# Patient Record
Sex: FEMALE | Race: WHITE | NOT HISPANIC OR LATINO | Employment: OTHER | URBAN - METROPOLITAN AREA
[De-identification: names, ages, dates, MRNs, and addresses within clinical notes are randomized per-mention and may not be internally consistent; named-entity substitution may affect disease eponyms.]

---

## 2019-10-25 ENCOUNTER — HOSPITAL ENCOUNTER (OUTPATIENT)
Facility: HOSPITAL | Age: 84
Discharge: HOME HEALTH | End: 2019-10-28
Attending: EMERGENCY MEDICINE | Admitting: EMERGENCY MEDICINE
Payer: MEDICARE

## 2019-10-25 DIAGNOSIS — R07.9 CHEST PAIN: ICD-10-CM

## 2019-10-25 DIAGNOSIS — E86.0 DEHYDRATION: ICD-10-CM

## 2019-10-25 DIAGNOSIS — I50.9 CHF (CONGESTIVE HEART FAILURE): Primary | ICD-10-CM

## 2019-10-25 PROBLEM — R00.1 BRADYCARDIA: Status: ACTIVE | Noted: 2019-10-25

## 2019-10-25 LAB
ALBUMIN SERPL BCP-MCNC: 3.6 G/DL (ref 3.5–5.2)
ALP SERPL-CCNC: 101 U/L (ref 55–135)
ALT SERPL W/O P-5'-P-CCNC: 18 U/L (ref 10–44)
ANION GAP SERPL CALC-SCNC: 11 MMOL/L (ref 8–16)
AST SERPL-CCNC: 24 U/L (ref 10–40)
BASOPHILS # BLD AUTO: 0.04 K/UL (ref 0–0.2)
BASOPHILS NFR BLD: 0.5 % (ref 0–1.9)
BILIRUB SERPL-MCNC: 0.6 MG/DL (ref 0.1–1)
BILIRUB UR QL STRIP: NEGATIVE
BNP SERPL-MCNC: 286 PG/ML (ref 0–99)
BUN SERPL-MCNC: 19 MG/DL (ref 8–23)
CALCIUM SERPL-MCNC: 9.8 MG/DL (ref 8.7–10.5)
CHLORIDE SERPL-SCNC: 106 MMOL/L (ref 95–110)
CK SERPL-CCNC: 48 U/L (ref 20–180)
CLARITY UR: CLEAR
CO2 SERPL-SCNC: 24 MMOL/L (ref 23–29)
COLOR UR: YELLOW
CREAT SERPL-MCNC: 1.9 MG/DL (ref 0.5–1.4)
DIFFERENTIAL METHOD: ABNORMAL
EOSINOPHIL # BLD AUTO: 0.1 K/UL (ref 0–0.5)
EOSINOPHIL NFR BLD: 1.7 % (ref 0–8)
ERYTHROCYTE [DISTWIDTH] IN BLOOD BY AUTOMATED COUNT: 12.9 % (ref 11.5–14.5)
EST. GFR  (AFRICAN AMERICAN): 27 ML/MIN/1.73 M^2
EST. GFR  (NON AFRICAN AMERICAN): 23 ML/MIN/1.73 M^2
GLUCOSE SERPL-MCNC: 111 MG/DL (ref 70–110)
GLUCOSE UR QL STRIP: NEGATIVE
HCT VFR BLD AUTO: 39.7 % (ref 37–48.5)
HGB BLD-MCNC: 13 G/DL (ref 12–16)
HGB UR QL STRIP: NEGATIVE
IMM GRANULOCYTES # BLD AUTO: 0.03 K/UL (ref 0–0.04)
IMM GRANULOCYTES NFR BLD AUTO: 0.4 % (ref 0–0.5)
KETONES UR QL STRIP: NEGATIVE
LEUKOCYTE ESTERASE UR QL STRIP: NEGATIVE
LYMPHOCYTES # BLD AUTO: 2.7 K/UL (ref 1–4.8)
LYMPHOCYTES NFR BLD: 33.8 % (ref 18–48)
MAGNESIUM SERPL-MCNC: 2 MG/DL (ref 1.6–2.6)
MCH RBC QN AUTO: 32.1 PG (ref 27–31)
MCHC RBC AUTO-ENTMCNC: 32.7 G/DL (ref 32–36)
MCV RBC AUTO: 98 FL (ref 82–98)
MONOCYTES # BLD AUTO: 0.9 K/UL (ref 0.3–1)
MONOCYTES NFR BLD: 11 % (ref 4–15)
NEUTROPHILS # BLD AUTO: 4.1 K/UL (ref 1.8–7.7)
NEUTROPHILS NFR BLD: 52.6 % (ref 38–73)
NITRITE UR QL STRIP: NEGATIVE
NRBC BLD-RTO: 0 /100 WBC
PH UR STRIP: 6 [PH] (ref 5–8)
PLATELET # BLD AUTO: 142 K/UL (ref 150–350)
PMV BLD AUTO: 11.6 FL (ref 9.2–12.9)
POTASSIUM SERPL-SCNC: 3.9 MMOL/L (ref 3.5–5.1)
PROT SERPL-MCNC: 6.7 G/DL (ref 6–8.4)
PROT UR QL STRIP: NEGATIVE
RBC # BLD AUTO: 4.05 M/UL (ref 4–5.4)
SODIUM SERPL-SCNC: 141 MMOL/L (ref 136–145)
SP GR UR STRIP: <=1.005 (ref 1–1.03)
TROPONIN I SERPL DL<=0.01 NG/ML-MCNC: 0.03 NG/ML (ref 0–0.03)
TSH SERPL DL<=0.005 MIU/L-ACNC: 0.87 UIU/ML (ref 0.4–4)
URN SPEC COLLECT METH UR: ABNORMAL
UROBILINOGEN UR STRIP-ACNC: NEGATIVE EU/DL
WBC # BLD AUTO: 7.85 K/UL (ref 3.9–12.7)

## 2019-10-25 PROCEDURE — 84484 ASSAY OF TROPONIN QUANT: CPT

## 2019-10-25 PROCEDURE — 83735 ASSAY OF MAGNESIUM: CPT

## 2019-10-25 PROCEDURE — G0378 HOSPITAL OBSERVATION PER HR: HCPCS

## 2019-10-25 PROCEDURE — 96361 HYDRATE IV INFUSION ADD-ON: CPT

## 2019-10-25 PROCEDURE — 82550 ASSAY OF CK (CPK): CPT

## 2019-10-25 PROCEDURE — 81003 URINALYSIS AUTO W/O SCOPE: CPT

## 2019-10-25 PROCEDURE — 36415 COLL VENOUS BLD VENIPUNCTURE: CPT

## 2019-10-25 PROCEDURE — 80053 COMPREHEN METABOLIC PANEL: CPT

## 2019-10-25 PROCEDURE — 25000003 PHARM REV CODE 250: Performed by: PHYSICIAN ASSISTANT

## 2019-10-25 PROCEDURE — 63600175 PHARM REV CODE 636 W HCPCS: Performed by: EMERGENCY MEDICINE

## 2019-10-25 PROCEDURE — 83880 ASSAY OF NATRIURETIC PEPTIDE: CPT

## 2019-10-25 PROCEDURE — 93010 ELECTROCARDIOGRAM REPORT: CPT | Mod: ,,, | Performed by: INTERNAL MEDICINE

## 2019-10-25 PROCEDURE — 93005 ELECTROCARDIOGRAM TRACING: CPT

## 2019-10-25 PROCEDURE — 84484 ASSAY OF TROPONIN QUANT: CPT | Mod: 91

## 2019-10-25 PROCEDURE — 93010 EKG 12-LEAD: ICD-10-PCS | Mod: ,,, | Performed by: INTERNAL MEDICINE

## 2019-10-25 PROCEDURE — 25000003 PHARM REV CODE 250: Performed by: NURSE PRACTITIONER

## 2019-10-25 PROCEDURE — 99285 EMERGENCY DEPT VISIT HI MDM: CPT | Mod: 25

## 2019-10-25 PROCEDURE — 85025 COMPLETE CBC W/AUTO DIFF WBC: CPT

## 2019-10-25 PROCEDURE — 84443 ASSAY THYROID STIM HORMONE: CPT

## 2019-10-25 RX ORDER — HYDROCODONE BITARTRATE AND ACETAMINOPHEN 5; 325 MG/1; MG/1
1 TABLET ORAL EVERY 6 HOURS PRN
Status: DISCONTINUED | OUTPATIENT
Start: 2019-10-25 | End: 2019-10-28 | Stop reason: HOSPADM

## 2019-10-25 RX ORDER — PANTOPRAZOLE SODIUM 40 MG/1
40 TABLET, DELAYED RELEASE ORAL DAILY
Status: DISCONTINUED | OUTPATIENT
Start: 2019-10-26 | End: 2019-10-28 | Stop reason: HOSPADM

## 2019-10-25 RX ORDER — HYDRALAZINE HYDROCHLORIDE 25 MG/1
25 TABLET, FILM COATED ORAL DAILY
Status: DISCONTINUED | OUTPATIENT
Start: 2019-10-26 | End: 2019-10-26

## 2019-10-25 RX ORDER — ASPIRIN 81 MG/1
81 TABLET ORAL DAILY
Status: DISCONTINUED | OUTPATIENT
Start: 2019-10-25 | End: 2019-10-28 | Stop reason: HOSPADM

## 2019-10-25 RX ORDER — HYDRALAZINE HYDROCHLORIDE 25 MG/1
25 TABLET, FILM COATED ORAL ONCE
Status: COMPLETED | OUTPATIENT
Start: 2019-10-25 | End: 2019-10-25

## 2019-10-25 RX ORDER — ACETAMINOPHEN 325 MG/1
650 TABLET ORAL EVERY 6 HOURS PRN
Status: DISCONTINUED | OUTPATIENT
Start: 2019-10-25 | End: 2019-10-28 | Stop reason: HOSPADM

## 2019-10-25 RX ORDER — AMLODIPINE AND VALSARTAN 5; 160 MG/1; MG/1
1 TABLET ORAL DAILY
Status: ON HOLD | COMMUNITY
End: 2019-10-28 | Stop reason: HOSPADM

## 2019-10-25 RX ORDER — BUSPIRONE HYDROCHLORIDE 15 MG/1
15 TABLET ORAL 3 TIMES DAILY
Status: ON HOLD | COMMUNITY
End: 2019-10-28 | Stop reason: HOSPADM

## 2019-10-25 RX ORDER — ATORVASTATIN CALCIUM 40 MG/1
40 TABLET, FILM COATED ORAL NIGHTLY
COMMUNITY
End: 2019-11-05 | Stop reason: SDUPTHER

## 2019-10-25 RX ORDER — LEVOTHYROXINE SODIUM 25 UG/1
25 TABLET ORAL
Status: DISCONTINUED | OUTPATIENT
Start: 2019-10-26 | End: 2019-10-28 | Stop reason: HOSPADM

## 2019-10-25 RX ORDER — OMEPRAZOLE 20 MG/1
20 TABLET, DELAYED RELEASE ORAL DAILY
Status: ON HOLD | COMMUNITY
End: 2019-10-28 | Stop reason: HOSPADM

## 2019-10-25 RX ORDER — MAG HYDROX/ALUMINUM HYD/SIMETH 200-200-20
30 SUSPENSION, ORAL (FINAL DOSE FORM) ORAL EVERY 6 HOURS PRN
Status: DISCONTINUED | OUTPATIENT
Start: 2019-10-25 | End: 2019-10-28 | Stop reason: HOSPADM

## 2019-10-25 RX ORDER — MAG HYDROX/ALUMINUM HYD/SIMETH 200-200-20
30 SUSPENSION, ORAL (FINAL DOSE FORM) ORAL
Status: DISCONTINUED | OUTPATIENT
Start: 2019-10-25 | End: 2019-10-25

## 2019-10-25 RX ORDER — MAG HYDROX/ALUMINUM HYD/SIMETH 200-200-20
30 SUSPENSION, ORAL (FINAL DOSE FORM) ORAL EVERY 6 HOURS PRN
Status: DISCONTINUED | OUTPATIENT
Start: 2019-10-25 | End: 2019-10-25

## 2019-10-25 RX ORDER — LEVOTHYROXINE SODIUM 25 UG/1
25 TABLET ORAL DAILY
COMMUNITY

## 2019-10-25 RX ORDER — HEPARIN SODIUM 5000 [USP'U]/ML
5000 INJECTION, SOLUTION INTRAVENOUS; SUBCUTANEOUS EVERY 8 HOURS
Status: DISCONTINUED | OUTPATIENT
Start: 2019-10-25 | End: 2019-10-28 | Stop reason: HOSPADM

## 2019-10-25 RX ORDER — ATORVASTATIN CALCIUM 40 MG/1
40 TABLET, FILM COATED ORAL NIGHTLY
Status: DISCONTINUED | OUTPATIENT
Start: 2019-10-25 | End: 2019-10-28 | Stop reason: HOSPADM

## 2019-10-25 RX ORDER — HYDRALAZINE HYDROCHLORIDE 25 MG/1
25 TABLET, FILM COATED ORAL DAILY
COMMUNITY
Start: 2019-08-18

## 2019-10-25 RX ORDER — HYDROCODONE BITARTRATE AND ACETAMINOPHEN 5; 325 MG/1; MG/1
0.5 TABLET ORAL
Status: ON HOLD | COMMUNITY
Start: 2019-06-03 | End: 2019-10-30 | Stop reason: HOSPADM

## 2019-10-25 RX ORDER — ONDANSETRON 8 MG/1
8 TABLET, ORALLY DISINTEGRATING ORAL EVERY 8 HOURS PRN
Status: DISCONTINUED | OUTPATIENT
Start: 2019-10-25 | End: 2019-10-28 | Stop reason: HOSPADM

## 2019-10-25 RX ORDER — LOSARTAN POTASSIUM 50 MG/1
50 TABLET ORAL DAILY
Status: DISCONTINUED | OUTPATIENT
Start: 2019-10-26 | End: 2019-10-26

## 2019-10-25 RX ORDER — AMLODIPINE BESYLATE 5 MG/1
5 TABLET ORAL DAILY
Status: DISCONTINUED | OUTPATIENT
Start: 2019-10-26 | End: 2019-10-28 | Stop reason: HOSPADM

## 2019-10-25 RX ORDER — FUROSEMIDE 20 MG/1
20 TABLET ORAL DAILY PRN
COMMUNITY
Start: 2019-08-18 | End: 2019-11-05 | Stop reason: SDUPTHER

## 2019-10-25 RX ORDER — TRAMADOL HYDROCHLORIDE 50 MG/1
50 TABLET ORAL EVERY 6 HOURS PRN
Status: DISCONTINUED | OUTPATIENT
Start: 2019-10-25 | End: 2019-10-28 | Stop reason: HOSPADM

## 2019-10-25 RX ORDER — METOPROLOL SUCCINATE 100 MG/1
100 TABLET, EXTENDED RELEASE ORAL DAILY
Status: ON HOLD | COMMUNITY
Start: 2019-08-18 | End: 2019-10-28 | Stop reason: HOSPADM

## 2019-10-25 RX ORDER — ASPIRIN 81 MG/1
81 TABLET ORAL DAILY
COMMUNITY

## 2019-10-25 RX ORDER — TRAMADOL HYDROCHLORIDE 50 MG/1
50 TABLET ORAL EVERY 6 HOURS PRN
Status: ON HOLD | COMMUNITY
End: 2019-10-28 | Stop reason: SDUPTHER

## 2019-10-25 RX ADMIN — SODIUM CHLORIDE 500 ML: 0.9 INJECTION, SOLUTION INTRAVENOUS at 11:10

## 2019-10-25 RX ADMIN — BUSPIRONE HYDROCHLORIDE 15 MG: 10 TABLET ORAL at 03:10

## 2019-10-25 RX ADMIN — ONDANSETRON 8 MG: 8 TABLET, ORALLY DISINTEGRATING ORAL at 09:10

## 2019-10-25 RX ADMIN — ATORVASTATIN CALCIUM 40 MG: 40 TABLET, FILM COATED ORAL at 09:10

## 2019-10-25 RX ADMIN — ASPIRIN 81 MG: 81 TABLET, COATED ORAL at 04:10

## 2019-10-25 RX ADMIN — ALUMINUM HYDROXIDE, MAGNESIUM HYDROXIDE, AND SIMETHICONE 30 ML: 200; 200; 20 SUSPENSION ORAL at 04:10

## 2019-10-25 RX ADMIN — HYDRALAZINE HYDROCHLORIDE 25 MG: 25 TABLET, FILM COATED ORAL at 09:10

## 2019-10-25 NOTE — H&P
[facility]  History & Physical    Patient Name: Danielle Cruz  MRN: [id number]  Admission Date: 10/25/2019  Attending Physician: Dunia Martinez MD   Primary Care Provider: Jan Qureshi MD         Patient information was obtained from patient, past medical records and ER records.     Subjective:     Principal Problem:Chest pain    Chief Complaint:   Chief Complaint   Patient presents with    Chest Pain     c/o tightness in chest, SOB, and weakness onset overnight. Pt denies any pain at this time.        HPI: Danielle Cruz is an 88 year old female with CAD status post CABG who presented to the ED with complaints of chest tightness that began early this morning while she was awake due to reflux symptoms She reports that the chest tightness is different than her reflux, located in her mid chest. Symptoms lasted approximately 2 hours and resolved spontaneously. She reports chronic SOB due to CHF, but states that this did not worse with her chest tightness. She notes nausea associated with her reflux, but no change. There was associated generalized weakness, but this improved after she ate oatmeal. She denies diaphoresis and cough. In the ED, the patient's initial troponin was elevated to 0.028. Other labs were significant for a creatinine of 1.9 which is consistent with her baseline on 8/18/19. and BNP of 286. Code status was discussed with the patient and her son, Leland Cruz Jr. She is a full code and her son is his surrogate medical decision maker.     Past Medical History:   Diagnosis Date    CHF (congestive heart failure)     Depression     GERD (gastroesophageal reflux disease)     Hyperlipemia     Hypertension     Thyroid disease        Past Surgical History:   Procedure Laterality Date    CARDIAC SURGERY      CHOLECYSTECTOMY         Review of patient's allergies indicates:   Allergen Reactions    Lisinopril Blisters       No current facility-administered  medications on file prior to encounter.      Current Outpatient Medications on File Prior to Encounter   Medication Sig    amlodipine-valsartan (EXFORGE) 5-160 mg per tablet Take 1 tablet by mouth once daily.    aspirin (ECOTRIN) 81 MG EC tablet Take 81 mg by mouth once daily.    atorvastatin (LIPITOR) 40 MG tablet Take 40 mg by mouth nightly.    busPIRone (BUSPAR) 15 MG tablet Take 15 mg by mouth 3 (three) times daily.    furosemide (LASIX) 20 MG tablet Take 20 mg by mouth daily as needed.    hydrALAZINE (APRESOLINE) 25 MG tablet Take 25 mg by mouth once daily.    levothyroxine (SYNTHROID) 25 MCG tablet Take 25 mcg by mouth once daily.    metoprolol succinate (TOPROL-XL) 100 MG 24 hr tablet Take 100 mg by mouth once daily.    omeprazole (PRILOSEC OTC) 20 MG tablet Take 20 mg by mouth once daily.    HYDROcodone-acetaminophen (NORCO) 5-325 mg per tablet Take 0.5 tablets by mouth as needed.    traMADol (ULTRAM) 50 mg tablet Take 50 mg by mouth every 6 (six) hours as needed for Pain.     Family History     Reviewed and not pertinent.         Tobacco Use    Smoking status: Never Smoker   Substance and Sexual Activity    Alcohol use: Not Currently    Drug use: Not on file    Sexual activity: Not on file     Review of Systems   Constitutional: Negative for appetite change, chills, diaphoresis, fatigue and fever.   HENT: Negative for congestion, ear pain, mouth sores, sore throat and trouble swallowing.    Eyes: Negative for pain and visual disturbance.   Respiratory: Positive for chest tightness and shortness of breath. Negative for cough.    Cardiovascular: Negative for chest pain, palpitations and leg swelling.   Gastrointestinal: Positive for nausea. Negative for abdominal pain and constipation.        Positive for reflux.    Endocrine: Negative for cold intolerance, heat intolerance, polydipsia and polyuria.   Genitourinary: Negative for dysuria, frequency and hematuria.   Musculoskeletal: Negative for  arthralgias, back pain, myalgias and neck pain.   Skin: Negative for pallor, rash and wound.   Allergic/Immunologic: Negative for environmental allergies and immunocompromised state.   Neurological: Positive for weakness (generalized). Negative for dizziness, seizures, syncope, numbness and headaches.   Hematological: Negative for adenopathy. Does not bruise/bleed easily.   Psychiatric/Behavioral: Negative for agitation, confusion and sleep disturbance.     Objective:     Vital Signs (Most Recent):  Temp: 97.6 °F (36.4 °C) (10/25/19 1358)  Pulse: (!) 52 (10/25/19 1358)  Resp: 18 (10/25/19 1358)  BP: (!) 177/68 (10/25/19 1358)  SpO2: 95 % (10/25/19 1358) Vital Signs (24h Range):  Temp:  [97.6 °F (36.4 °C)-98 °F (36.7 °C)] 97.6 °F (36.4 °C)  Pulse:  [50-57] 52  Resp:  [18-22] 18  SpO2:  [95 %-97 %] 95 %  BP: (152-177)/(64-71) 177/68     Weight: 62.3 kg (137 lb 4.8 oz)  There is no height or weight on file to calculate BMI.    Physical Exam   Constitutional: She is oriented to person, place, and time. She appears well-developed and well-nourished. No distress.   HENT:   Head: Normocephalic and atraumatic.   Eyes: Conjunctivae are normal.   PERRL   Neck: Neck supple. No JVD present.   Cardiovascular: Regular rhythm and normal heart sounds. Bradycardia present.   Pulmonary/Chest: Effort normal and breath sounds normal.   Abdominal: Soft. Bowel sounds are normal. She exhibits no distension. There is no tenderness.   Musculoskeletal: Normal range of motion. She exhibits edema (+1 bilateral lower extremity).   Neurological: She is alert and oriented to person, place, and time.   Skin: Skin is warm and dry.   Psychiatric: She has a normal mood and affect. Her behavior is normal. Thought content normal.   Nursing note and vitals reviewed.          Significant Labs:   CBC:   Recent Labs   Lab 10/25/19  0950   WBC 7.85   HGB 13.0   HCT 39.7   *     CMP:   Recent Labs   Lab 10/25/19  0950      K 3.9      CO2  24   *   BUN 19   CREATININE 1.9*   CALCIUM 9.8   PROT 6.7   ALBUMIN 3.6   BILITOT 0.6   ALKPHOS 101   AST 24   ALT 18   ANIONGAP 11   EGFRNONAA 23*     Troponin:   Recent Labs   Lab 10/25/19  0950   TROPONINI 0.028*     All pertinent labs within the past 24 hours have been reviewed.    Significant Imaging: I have reviewed all pertinent imaging results/findings within the past 24 hours.   Imaging Results          X-Ray Chest AP Portable (Final result)  Result time 10/25/19 10:06:15    Final result by Mariela Marcano MD (10/25/19 10:06:15)                 Impression:      Tiny left pleural effusion.    Prior sternotomy.      Electronically signed by: Mariela Marcano  Date:    10/25/2019  Time:    10:06             Narrative:    EXAMINATION:  XR CHEST AP PORTABLE    CLINICAL HISTORY:  chest pain;    COMPARISON:  No priors    FINDINGS:  No infiltrate or effusion identified.  There has been prior sternotomy.  Cardiac silhouette is within normal limits.  There is a tiny left pleural effusion.                                  Assessment/Plan:     * Chest pain with elevated troponin  -Pain could be related to reflux and elevated troponin is most likely related to CHF. Low suspicion for ACS.   -Trend troponin.   -Continue ASA and statin.   -Hold metoprolol due to bradycardia.       Bradycardia  -Could be contributing to patient's weakness.   -Hold metoprolol.   -Check TSH.       Hyperlipemia  Continue statin.       GERD (gastroesophageal reflux disease)  -Continue PPI.   -Maalox as needed.       Hypertension  -Stable.   -Continue Exforge and hydralazine.       Hypothyroidism  -Follow up TSH.   -Continue levothyroxine.       History of CVA (cerebrovascular accident)  -Stable.   -Continue ASA and statin.       VTE Risk Mitigation (From admission, onward)         Ordered     heparin (porcine) injection 5,000 Units  Every 8 hours      10/25/19 2970                   CAMILA Aldana  Department of  [facility]   [facility]

## 2019-10-25 NOTE — PLAN OF CARE
Patient awake, alert and oriented x 4, calm, cooperative.  Patient able to make needs known.   Patient denies pain at this time.   PRN pain meds administered per MAR.  IV site patent and intact, no redness.  No other complaints voiced at this time.   Continue on telemetry monitoring.   Safety precautions remains in place.   Willi continue to monitor.

## 2019-10-25 NOTE — ED NOTES
Pt lying in bed,  AAO x3, BBS clear, abd soft non distended, . IV site clean , dry, and intact, fluids infusioning without difficulty. pt tolerated well.no complaints at present. Family at bedside

## 2019-10-25 NOTE — SUBJECTIVE & OBJECTIVE
Past Medical History:   Diagnosis Date    CHF (congestive heart failure)     Depression     GERD (gastroesophageal reflux disease)     Hyperlipemia     Hypertension     Thyroid disease        Past Surgical History:   Procedure Laterality Date    CARDIAC SURGERY      CHOLECYSTECTOMY         Review of patient's allergies indicates:   Allergen Reactions    Lisinopril Blisters       No current facility-administered medications on file prior to encounter.      Current Outpatient Medications on File Prior to Encounter   Medication Sig    amlodipine-valsartan (EXFORGE) 5-160 mg per tablet Take 1 tablet by mouth once daily.    aspirin (ECOTRIN) 81 MG EC tablet Take 81 mg by mouth once daily.    atorvastatin (LIPITOR) 40 MG tablet Take 40 mg by mouth nightly.    busPIRone (BUSPAR) 15 MG tablet Take 15 mg by mouth 3 (three) times daily.    furosemide (LASIX) 20 MG tablet Take 20 mg by mouth daily as needed.    hydrALAZINE (APRESOLINE) 25 MG tablet Take 25 mg by mouth once daily.    levothyroxine (SYNTHROID) 25 MCG tablet Take 25 mcg by mouth once daily.    metoprolol succinate (TOPROL-XL) 100 MG 24 hr tablet Take 100 mg by mouth once daily.    omeprazole (PRILOSEC OTC) 20 MG tablet Take 20 mg by mouth once daily.    HYDROcodone-acetaminophen (NORCO) 5-325 mg per tablet Take 0.5 tablets by mouth as needed.    traMADol (ULTRAM) 50 mg tablet Take 50 mg by mouth every 6 (six) hours as needed for Pain.     Family History     None        Tobacco Use    Smoking status: Never Smoker   Substance and Sexual Activity    Alcohol use: Not Currently    Drug use: Not on file    Sexual activity: Not on file     Review of Systems   Constitutional: Negative for appetite change, chills, diaphoresis, fatigue and fever.   HENT: Negative for congestion, ear pain, mouth sores, sore throat and trouble swallowing.    Eyes: Negative for pain and visual disturbance.   Respiratory: Positive for chest tightness and shortness of  breath. Negative for cough.    Cardiovascular: Negative for chest pain, palpitations and leg swelling.   Gastrointestinal: Positive for nausea. Negative for abdominal pain and constipation.        Positive for reflux.    Endocrine: Negative for cold intolerance, heat intolerance, polydipsia and polyuria.   Genitourinary: Negative for dysuria, frequency and hematuria.   Musculoskeletal: Negative for arthralgias, back pain, myalgias and neck pain.   Skin: Negative for pallor, rash and wound.   Allergic/Immunologic: Negative for environmental allergies and immunocompromised state.   Neurological: Positive for weakness (generalized). Negative for dizziness, seizures, syncope, numbness and headaches.   Hematological: Negative for adenopathy. Does not bruise/bleed easily.   Psychiatric/Behavioral: Negative for agitation, confusion and sleep disturbance.     Objective:     Vital Signs (Most Recent):  Temp: 97.6 °F (36.4 °C) (10/25/19 1358)  Pulse: (!) 52 (10/25/19 1358)  Resp: 18 (10/25/19 1358)  BP: (!) 177/68 (10/25/19 1358)  SpO2: 95 % (10/25/19 1358) Vital Signs (24h Range):  Temp:  [97.6 °F (36.4 °C)-98 °F (36.7 °C)] 97.6 °F (36.4 °C)  Pulse:  [50-57] 52  Resp:  [18-22] 18  SpO2:  [95 %-97 %] 95 %  BP: (152-177)/(64-71) 177/68     Weight: 62.3 kg (137 lb 4.8 oz)  There is no height or weight on file to calculate BMI.    Physical Exam   Constitutional: She is oriented to person, place, and time. She appears well-developed and well-nourished. No distress.   HENT:   Head: Normocephalic and atraumatic.   Eyes: Conjunctivae are normal.   PERRL   Neck: Neck supple. No JVD present.   Cardiovascular: Regular rhythm and normal heart sounds. Bradycardia present.   Pulmonary/Chest: Effort normal and breath sounds normal.   Abdominal: Soft. Bowel sounds are normal. She exhibits no distension. There is no tenderness.   Musculoskeletal: Normal range of motion. She exhibits edema (+1 bilateral lower extremity).   Neurological: She  is alert and oriented to person, place, and time.   Skin: Skin is warm and dry.   Psychiatric: She has a normal mood and affect. Her behavior is normal. Thought content normal.   Nursing note and vitals reviewed.          Significant Labs:   CBC:   Recent Labs   Lab 10/25/19  0950   WBC 7.85   HGB 13.0   HCT 39.7   *     CMP:   Recent Labs   Lab 10/25/19  0950      K 3.9      CO2 24   *   BUN 19   CREATININE 1.9*   CALCIUM 9.8   PROT 6.7   ALBUMIN 3.6   BILITOT 0.6   ALKPHOS 101   AST 24   ALT 18   ANIONGAP 11   EGFRNONAA 23*     Troponin:   Recent Labs   Lab 10/25/19  0950   TROPONINI 0.028*     All pertinent labs within the past 24 hours have been reviewed.    Significant Imaging: I have reviewed all pertinent imaging results/findings within the past 24 hours.   Imaging Results          X-Ray Chest AP Portable (Final result)  Result time 10/25/19 10:06:15    Final result by Mariela Marcano MD (10/25/19 10:06:15)                 Impression:      Tiny left pleural effusion.    Prior sternotomy.      Electronically signed by: Mariela Marcano  Date:    10/25/2019  Time:    10:06             Narrative:    EXAMINATION:  XR CHEST AP PORTABLE    CLINICAL HISTORY:  chest pain;    COMPARISON:  No priors    FINDINGS:  No infiltrate or effusion identified.  There has been prior sternotomy.  Cardiac silhouette is within normal limits.  There is a tiny left pleural effusion.

## 2019-10-25 NOTE — ED PROVIDER NOTES
SCRIBE #1 NOTE: I, Alexander Day, am scribing for, and in the presence of, Martin Vizcaino MD. I have scribed the entire note.       History     Chief Complaint   Patient presents with    Chest Pain     c/o tightness in chest, SOB, and weakness onset overnight. Pt denies any pain at this time.     Review of patient's allergies indicates:   Allergen Reactions    Lisinopril Blisters         History of Present Illness     HPI    10/25/2019, 9:49 AM  History obtained from the patient      History of Present Illness: Danielle Cruz is a 88 y.o. female patient who presents to the Emergency Department for evaluation of chest pain which onset around 0200 this morning. Pt describes the chest pain as a heaviness and pressure. Symptoms are constant and moderate in severity. No mitigating or exacerbating factors reported. Associated sxs include generalized weakness. Patient denies any SOB, diaphoresis, palpitations, extremity weakness/numbness, leg pain/swelling, dizziness, cough, n/v, and all other sxs at this time. No further complaints or concerns at this time.         Arrival mode: Personal vehicle      PCP: Jan Qureshi MD        Past Medical History:  Past Medical History:   Diagnosis Date    CHF (congestive heart failure)     Depression     GERD (gastroesophageal reflux disease)     Hyperlipemia     Hypertension     Thyroid disease        Past Surgical History:  Past Surgical History:   Procedure Laterality Date    CARDIAC SURGERY      CHOLECYSTECTOMY           Family History:  History reviewed. No pertinent family history.    Social History:  Social History     Tobacco Use    Smoking status: Never Smoker   Substance and Sexual Activity    Alcohol use: Not Currently    Drug use: Not on file    Sexual activity: Not on file        Review of Systems     Review of Systems   Constitutional: Negative for diaphoresis and fever.        (+) Generalized weakness   HENT: Negative for sore throat.     Respiratory: Negative for cough and shortness of breath.    Cardiovascular: Positive for chest pain. Negative for palpitations.   Gastrointestinal: Negative for nausea and vomiting.   Genitourinary: Negative for dysuria.   Musculoskeletal: Negative for back pain.   Skin: Negative for rash.   Neurological: Negative for dizziness, weakness and numbness.   Hematological: Does not bruise/bleed easily.   All other systems reviewed and are negative.       Physical Exam     Initial Vitals   BP Pulse Resp Temp SpO2   10/25/19 0949 10/25/19 0944 10/25/19 0944 -- 10/25/19 0944   (!) 152/69 (!) 55 18  96 %      MAP       --                 Physical Exam  Nursing Notes and Vital Signs Reviewed.  Constitutional: Patient is in no apparent distress. Well-developed. Elderly. Frail.   Head: Atraumatic. Normocephalic.  Eyes: PERRL. EOM intact. Conjunctivae are not pale. No scleral icterus.  ENT: Mucous membranes are moist. Oropharynx is clear and symmetric.    Neck: Supple. Full ROM. No lymphadenopathy.  Cardiovascular: Regular rate. Regular rhythm. No murmurs, rubs, or gallops. Distal pulses are 2+ and symmetric.  Pulmonary/Chest: No respiratory distress. Clear to auscultation bilaterally. No wheezing or rales.  Abdominal: Soft and non-distended.  There is no tenderness.  No rebound, guarding, or rigidity. Good bowel sounds.  Genitourinary: No CVA tenderness  Musculoskeletal: Moves all extremities. No obvious deformities. No edema. No calf tenderness.  Skin: Warm and dry.  Neurological:  Alert, awake, and appropriate.  Normal speech.  No acute focal neurological deficits are appreciated.  Psychiatric: Normal affect. Good eye contact. Appropriate in content.     ED Course   Procedures  ED Vital Signs:  Vitals:    10/25/19 0944 10/25/19 0949 10/25/19 0951 10/25/19 1000   BP:  (!) 152/69  (!) 153/64   Pulse: (!) 55  (!) 57 (!) 51   Resp: 18   18   TempSrc: Oral      SpO2: 96%   97%   Weight: 62.3 kg (137 lb 4.8 oz)       10/25/19  1115 10/25/19 1130   BP: (!) 155/70 (!) 157/71   Pulse: (!) 50 (!) 52   Resp: (!) 22 (!) 22   TempSrc:     SpO2: 97% 97%   Weight:         Abnormal Lab Results:  Labs Reviewed   CBC W/ AUTO DIFFERENTIAL - Abnormal; Notable for the following components:       Result Value    Mean Corpuscular Hemoglobin 32.1 (*)     Platelets 142 (*)     All other components within normal limits   COMPREHENSIVE METABOLIC PANEL - Abnormal; Notable for the following components:    Glucose 111 (*)     Creatinine 1.9 (*)     eGFR if  27 (*)     eGFR if non  23 (*)     All other components within normal limits   URINALYSIS, REFLEX TO URINE CULTURE - Abnormal; Notable for the following components:    Specific Gravity, UA <=1.005 (*)     All other components within normal limits    Narrative:     Preferred Collection Type->Urine, Clean Catch   B-TYPE NATRIURETIC PEPTIDE - Abnormal; Notable for the following components:     (*)     All other components within normal limits   TROPONIN I - Abnormal; Notable for the following components:    Troponin I 0.028 (*)     All other components within normal limits   CK        All Lab Results:  Results for orders placed or performed during the hospital encounter of 10/25/19   CBC auto differential   Result Value Ref Range    WBC 7.85 3.90 - 12.70 K/uL    RBC 4.05 4.00 - 5.40 M/uL    Hemoglobin 13.0 12.0 - 16.0 g/dL    Hematocrit 39.7 37.0 - 48.5 %    Mean Corpuscular Volume 98 82 - 98 fL    Mean Corpuscular Hemoglobin 32.1 (H) 27.0 - 31.0 pg    Mean Corpuscular Hemoglobin Conc 32.7 32.0 - 36.0 g/dL    RDW 12.9 11.5 - 14.5 %    Platelets 142 (L) 150 - 350 K/uL    MPV 11.6 9.2 - 12.9 fL    Immature Granulocytes 0.4 0.0 - 0.5 %    Gran # (ANC) 4.1 1.8 - 7.7 K/uL    Immature Grans (Abs) 0.03 0.00 - 0.04 K/uL    Lymph # 2.7 1.0 - 4.8 K/uL    Mono # 0.9 0.3 - 1.0 K/uL    Eos # 0.1 0.0 - 0.5 K/uL    Baso # 0.04 0.00 - 0.20 K/uL    nRBC 0 0 /100 WBC    Gran% 52.6 38.0 -  73.0 %    Lymph% 33.8 18.0 - 48.0 %    Mono% 11.0 4.0 - 15.0 %    Eosinophil% 1.7 0.0 - 8.0 %    Basophil% 0.5 0.0 - 1.9 %    Differential Method Automated    Comprehensive metabolic panel   Result Value Ref Range    Sodium 141 136 - 145 mmol/L    Potassium 3.9 3.5 - 5.1 mmol/L    Chloride 106 95 - 110 mmol/L    CO2 24 23 - 29 mmol/L    Glucose 111 (H) 70 - 110 mg/dL    BUN, Bld 19 8 - 23 mg/dL    Creatinine 1.9 (H) 0.5 - 1.4 mg/dL    Calcium 9.8 8.7 - 10.5 mg/dL    Total Protein 6.7 6.0 - 8.4 g/dL    Albumin 3.6 3.5 - 5.2 g/dL    Total Bilirubin 0.6 0.1 - 1.0 mg/dL    Alkaline Phosphatase 101 55 - 135 U/L    AST 24 10 - 40 U/L    ALT 18 10 - 44 U/L    Anion Gap 11 8 - 16 mmol/L    eGFR if African American 27 (A) >60 mL/min/1.73 m^2    eGFR if non African American 23 (A) >60 mL/min/1.73 m^2   Urinalysis, Reflex to Urine Culture Urine, Clean Catch   Result Value Ref Range    Specimen UA Urine, Clean Catch     Color, UA Yellow Yellow, Straw, Leana    Appearance, UA Clear Clear    pH, UA 6.0 5.0 - 8.0    Specific Gravity, UA <=1.005 (A) 1.005 - 1.030    Protein, UA Negative Negative    Glucose, UA Negative Negative    Ketones, UA Negative Negative    Bilirubin (UA) Negative Negative    Occult Blood UA Negative Negative    Nitrite, UA Negative Negative    Urobilinogen, UA Negative <2.0 EU/dL    Leukocytes, UA Negative Negative   Brain natriuretic peptide   Result Value Ref Range     (H) 0 - 99 pg/mL   CK   Result Value Ref Range    CPK 48 20 - 180 U/L   Troponin I   Result Value Ref Range    Troponin I 0.028 (H) 0.000 - 0.026 ng/mL         Imaging Results:  Imaging Results          X-Ray Chest AP Portable (Final result)  Result time 10/25/19 10:06:15    Final result by Mariela Marcano MD (10/25/19 10:06:15)                 Impression:      Tiny left pleural effusion.    Prior sternotomy.      Electronically signed by: Mariela Marcano  Date:    10/25/2019  Time:    10:06             Narrative:     EXAMINATION:  XR CHEST AP PORTABLE    CLINICAL HISTORY:  chest pain;    COMPARISON:  No priors    FINDINGS:  No infiltrate or effusion identified.  There has been prior sternotomy.  Cardiac silhouette is within normal limits.  There is a tiny left pleural effusion.                                 The EKG was ordered, reviewed, and independently interpreted by the ED provider.  Interpretation time: 0951  Rate: 52 BPM  Rhythm: sinus bradycardia  Interpretation: LVH with repolarization abnormality. Inferior infarct. No STEMI.              The Emergency Provider reviewed the vital signs and test results, which are outlined above.     ED Discussion     10:26 AM: Reassessed pt at this time.  Pt states her condition has improved at this time. Discussed with family, in detail, pertinent results and possible admission for serial troponins. Discussed with pt all pertinent ED information, results, and need for admission. Pt states that she believes her sxs were caused from taking a new medication this morning and believes that she does not need admission. Discussed pt dx and plan of tx. Gave pt all f/u and return to the ED instructions. All questions and concerns were addressed at this time. Pt expresses understanding of information and instructions, and is comfortable with plan to discharge. Pt is stable for discharge.    12:25 PM  Patient states that she has changed her mind, and will stay for observation    12:36 PM: Discussed case with Martha ([facility]). Dr. Martinez agrees with current care and management of pt and accepts admission.   Admitting Service: [facility] medicine   Admitting Physician: Dr. Martinez  Admit to: Observation tele.              Medical Decision Making:   Clinical Tests:   Lab Tests: Ordered and Reviewed  Radiological Study: Ordered and Reviewed  Medical Tests: Ordered and Reviewed           ED Medication(s):  Medications   sodium chloride 0.9% bolus 500 mL (500 mLs Intravenous New Bag 10/25/19  1116)       New Prescriptions    No medications on file       Follow-up Information     Jan Qureshi MD.    Specialty:  Family Medicine  Contact information:  [id number] [facility] [id number] [phone]                       Scribe Attestation:   Scribe #1: I performed the above scribed service and the documentation accurately describes the services I performed. I attest to the accuracy of the note.     Attending:   Physician Attestation Statement for Scribe #1: I, Martin Vizcaino MD, personally performed the services described in this documentation, as scribed by Davy Hough, in my presence, and it is both accurate and complete.           Clinical Impression       ICD-10-CM ICD-9-CM   1. Chest pain R07.9 786.50   2. Dehydration E86.0 276.51       Disposition:   Disposition: Placed in Observation  Condition: Stable         Martin Vizcaino MD  10/25/19 1207       Martin Vizcaino MD  10/25/19 1236

## 2019-10-25 NOTE — ASSESSMENT & PLAN NOTE
-Pain could be related to reflux and elevated troponin is most likely related to CHF. Low suspicion for ACS.   -Trend troponin.   -Continue ASA and statin.   -Hold metoprolol due to bradycardia.

## 2019-10-25 NOTE — ED NOTES
"Pt c/o taking a new medication last night for the first time for her recent dx of GERD. Pt reports feeling very "dried out", SOB, and some weakness after taking the pill. Pt denies any pain or other symptoms at this time.    Patient moved to ED room 14, patient assisted onto stretcher and changed into a gown. Patient placed on cardiac monitor, continuous pulse oximetry and automatic blood pressure cuff. Bed placed in low locked position, side rails up x 2, call light is within reach of patient or family, orientation to room and explanation of wait provided to family and patient, alarms set and turned on for monitor and pulse ox, awaiting MD evaluation and orders, will continue to monitor.    Patient identifies self as Danielle Cruz      LOC: The patient is awake, alert and aware of environment with an appropriate affect, the patient is oriented x 3 and speaking appropriately.  APPEARANCE: Patient resting comfortably and in no acute distress, patient is clean and well groomed, patient's clothing is properly fastened.  SKIN: The skin is warm and dry, color consistent with ethnicity, patient has normal skin turgor and moist mucus membranes, skin intact, no breakdown or bruising noted.  MUSCULOSKELETAL: Patient moving all extremities well, no obvious swelling or deformities noted.  RESPIRATORY: Airway is open and patent, respirations are spontaneous, patient has a normal effort and rate, no accessory muscle use noted.  CARDIAC: Patient is sinus bradycardia on bedside monitor, trace peripheral edema noted to lower extremities, capillary refill < 3 seconds.  ABDOMEN: Soft and non tender to palpation, no distention noted.  NEUROLOGIC: PERRL, eyes open spontaneously, behavior appropriate to situation, follows commands, facial expression symmetrical, bilateral hand grasp equal and even, purposeful motor response noted, normal sensation in all extremities when touched with a finger.  "

## 2019-10-25 NOTE — HPI
Danielle Cruz is an 88 year old female with CAD status post CABG who presented to the ED with complaints of chest tightness that began early this morning while she was awake due to reflux symptoms She reports that the chest tightness is different than her reflux, located in her mid chest. Symptoms lasted approximately 2 hours and resolved spontaneously. She reports chronic SOB due to CHF, but states that this did not worse with her chest tightness. She notes nausea associated with her reflux, but no change. There was associated generalized weakness, but this improved after she ate oatmeal. She denies diaphoresis and cough. In the ED, the patient's initial troponin was elevated to 0.028. Other labs were significant for a creatinine of 1.9 which is consistent with her baseline on 8/18/19. and BNP of 286. Code status was discussed with the patient and her son, Leland Cruz Jr. She is a full code and her son is his surrogate medical decision maker.

## 2019-10-26 PROBLEM — I50.9 CHF (CONGESTIVE HEART FAILURE): Status: ACTIVE | Noted: 2019-10-26

## 2019-10-26 LAB
ANION GAP SERPL CALC-SCNC: 13 MMOL/L (ref 8–16)
BASOPHILS # BLD AUTO: 0.03 K/UL (ref 0–0.2)
BASOPHILS NFR BLD: 0.4 % (ref 0–1.9)
BUN SERPL-MCNC: 15 MG/DL (ref 8–23)
CALCIUM SERPL-MCNC: 9.4 MG/DL (ref 8.7–10.5)
CHLORIDE SERPL-SCNC: 109 MMOL/L (ref 95–110)
CO2 SERPL-SCNC: 21 MMOL/L (ref 23–29)
CREAT SERPL-MCNC: 1.8 MG/DL (ref 0.5–1.4)
DIFFERENTIAL METHOD: ABNORMAL
EOSINOPHIL # BLD AUTO: 0.2 K/UL (ref 0–0.5)
EOSINOPHIL NFR BLD: 2.8 % (ref 0–8)
ERYTHROCYTE [DISTWIDTH] IN BLOOD BY AUTOMATED COUNT: 13 % (ref 11.5–14.5)
EST. GFR  (AFRICAN AMERICAN): 29 ML/MIN/1.73 M^2
EST. GFR  (NON AFRICAN AMERICAN): 25 ML/MIN/1.73 M^2
GLUCOSE SERPL-MCNC: 90 MG/DL (ref 70–110)
HCT VFR BLD AUTO: 37.6 % (ref 37–48.5)
HGB BLD-MCNC: 12.4 G/DL (ref 12–16)
IMM GRANULOCYTES # BLD AUTO: 0.02 K/UL (ref 0–0.04)
IMM GRANULOCYTES NFR BLD AUTO: 0.3 % (ref 0–0.5)
LYMPHOCYTES # BLD AUTO: 2.6 K/UL (ref 1–4.8)
LYMPHOCYTES NFR BLD: 38.1 % (ref 18–48)
MAGNESIUM SERPL-MCNC: 2 MG/DL (ref 1.6–2.6)
MCH RBC QN AUTO: 32.7 PG (ref 27–31)
MCHC RBC AUTO-ENTMCNC: 33 G/DL (ref 32–36)
MCV RBC AUTO: 99 FL (ref 82–98)
MONOCYTES # BLD AUTO: 0.8 K/UL (ref 0.3–1)
MONOCYTES NFR BLD: 11.8 % (ref 4–15)
NEUTROPHILS # BLD AUTO: 3.2 K/UL (ref 1.8–7.7)
NEUTROPHILS NFR BLD: 46.6 % (ref 38–73)
NRBC BLD-RTO: 0 /100 WBC
PHOSPHATE SERPL-MCNC: 3 MG/DL (ref 2.7–4.5)
PLATELET # BLD AUTO: 132 K/UL (ref 150–350)
PMV BLD AUTO: 12.5 FL (ref 9.2–12.9)
POTASSIUM SERPL-SCNC: 4.8 MMOL/L (ref 3.5–5.1)
RBC # BLD AUTO: 3.79 M/UL (ref 4–5.4)
SODIUM SERPL-SCNC: 143 MMOL/L (ref 136–145)
WBC # BLD AUTO: 6.85 K/UL (ref 3.9–12.7)

## 2019-10-26 PROCEDURE — 94761 N-INVAS EAR/PLS OXIMETRY MLT: CPT

## 2019-10-26 PROCEDURE — 25000003 PHARM REV CODE 250: Performed by: NURSE PRACTITIONER

## 2019-10-26 PROCEDURE — G0378 HOSPITAL OBSERVATION PER HR: HCPCS

## 2019-10-26 PROCEDURE — 36415 COLL VENOUS BLD VENIPUNCTURE: CPT

## 2019-10-26 PROCEDURE — 83735 ASSAY OF MAGNESIUM: CPT

## 2019-10-26 PROCEDURE — 85025 COMPLETE CBC W/AUTO DIFF WBC: CPT

## 2019-10-26 PROCEDURE — 84100 ASSAY OF PHOSPHORUS: CPT

## 2019-10-26 PROCEDURE — 80048 BASIC METABOLIC PNL TOTAL CA: CPT

## 2019-10-26 PROCEDURE — 25000003 PHARM REV CODE 250: Performed by: PHYSICIAN ASSISTANT

## 2019-10-26 PROCEDURE — 96374 THER/PROPH/DIAG INJ IV PUSH: CPT

## 2019-10-26 PROCEDURE — 63600175 PHARM REV CODE 636 W HCPCS: Performed by: NURSE PRACTITIONER

## 2019-10-26 RX ORDER — FUROSEMIDE 10 MG/ML
20 INJECTION INTRAMUSCULAR; INTRAVENOUS ONCE
Status: COMPLETED | OUTPATIENT
Start: 2019-10-26 | End: 2019-10-26

## 2019-10-26 RX ORDER — HYDRALAZINE HYDROCHLORIDE 25 MG/1
25 TABLET, FILM COATED ORAL 2 TIMES DAILY
Status: DISCONTINUED | OUTPATIENT
Start: 2019-10-26 | End: 2019-10-28 | Stop reason: HOSPADM

## 2019-10-26 RX ADMIN — ATORVASTATIN CALCIUM 40 MG: 40 TABLET, FILM COATED ORAL at 09:10

## 2019-10-26 RX ADMIN — BUSPIRONE HYDROCHLORIDE 15 MG: 10 TABLET ORAL at 03:10

## 2019-10-26 RX ADMIN — LOSARTAN POTASSIUM 50 MG: 50 TABLET ORAL at 09:10

## 2019-10-26 RX ADMIN — ALUMINUM HYDROXIDE, MAGNESIUM HYDROXIDE, AND SIMETHICONE 30 ML: 200; 200; 20 SUSPENSION ORAL at 12:10

## 2019-10-26 RX ADMIN — LEVOTHYROXINE SODIUM 25 MCG: 25 TABLET ORAL at 06:10

## 2019-10-26 RX ADMIN — FUROSEMIDE 20 MG: 10 INJECTION, SOLUTION INTRAMUSCULAR; INTRAVENOUS at 04:10

## 2019-10-26 RX ADMIN — ONDANSETRON 8 MG: 8 TABLET, ORALLY DISINTEGRATING ORAL at 06:10

## 2019-10-26 RX ADMIN — HYDRALAZINE HYDROCHLORIDE 25 MG: 25 TABLET, FILM COATED ORAL at 09:10

## 2019-10-26 RX ADMIN — ASPIRIN 81 MG: 81 TABLET, COATED ORAL at 09:10

## 2019-10-26 RX ADMIN — BUSPIRONE HYDROCHLORIDE 15 MG: 10 TABLET ORAL at 09:10

## 2019-10-26 RX ADMIN — AMLODIPINE BESYLATE 5 MG: 5 TABLET ORAL at 09:10

## 2019-10-26 RX ADMIN — ALUMINUM HYDROXIDE, MAGNESIUM HYDROXIDE, AND SIMETHICONE 30 ML: 200; 200; 20 SUSPENSION ORAL at 02:10

## 2019-10-26 RX ADMIN — PANTOPRAZOLE SODIUM 40 MG: 40 TABLET, DELAYED RELEASE ORAL at 09:10

## 2019-10-26 NOTE — PLAN OF CARE
Pt AAO x 4.  VSS.  Pt remained afebrile throughout this shift.   IV lasix administered per order.   Pt remained free of falls this shift.   Pt c/o of epigastric discomfort this shift.  PRN meds administered as ordered.   Plan of care reviewed. Patient verbalizes understanding.   Pt moving/turing independently. Frequent weight shifting encouraged.  Bed low, side rails up x 2, wheels locked, call light in reach.   Bed alarm maintained for safety.   Patient instructed to call for assistance.   Hourly rounding completed.   24 hour chart check completed.  Will continue to monitor.

## 2019-10-26 NOTE — SUBJECTIVE & OBJECTIVE
Interval History: pt continues to report nausea, decreased appetite, and weakness with CP and SOB improved.  Bradycardia noted on monitor with beta blocker held.  Creatinine improved with ARB held.      Review of Systems   Constitutional: Negative for appetite change, chills, diaphoresis, fatigue and fever.   HENT: Negative for congestion, ear pain, mouth sores, sore throat and trouble swallowing.    Eyes: Negative for pain and visual disturbance.   Respiratory: Positive for chest tightness (improved ) and shortness of breath. Negative for cough.    Cardiovascular: Negative for chest pain, palpitations and leg swelling.   Gastrointestinal: Positive for nausea. Negative for abdominal pain and constipation.        Positive for reflux.    Endocrine: Negative for cold intolerance, heat intolerance, polydipsia and polyuria.   Genitourinary: Negative for dysuria, frequency and hematuria.   Musculoskeletal: Negative for arthralgias, back pain, myalgias and neck pain.   Skin: Negative for pallor, rash and wound.   Allergic/Immunologic: Negative for environmental allergies and immunocompromised state.   Neurological: Positive for weakness (generalized). Negative for dizziness, seizures, syncope, numbness and headaches.   Hematological: Negative for adenopathy. Does not bruise/bleed easily.   Psychiatric/Behavioral: Negative for agitation, confusion and sleep disturbance.     Objective:     Vital Signs (Most Recent):  Temp: 97.5 °F (36.4 °C) (10/26/19 1213)  Pulse: (!) 50 (10/26/19 1213)  Resp: 16 (10/26/19 0812)  BP: 137/65 (10/26/19 1213)  SpO2: (!) 92 % (10/26/19 1213) Vital Signs (24h Range):  Temp:  [97.5 °F (36.4 °C)-98.3 °F (36.8 °C)] 97.5 °F (36.4 °C)  Pulse:  [49-54] 50  Resp:  [16-18] 16  SpO2:  [92 %-97 %] 92 %  BP: (128-149)/(59-73) 137/65     Weight: 62.3 kg (137 lb 4.8 oz)  Body mass index is 25.11 kg/m².    Intake/Output Summary (Last 24 hours) at 10/26/2019 1519  Last data filed at 10/26/2019 0500  Gross per  24 hour   Intake 290 ml   Output --   Net 290 ml      Physical Exam   Constitutional: She is oriented to person, place, and time. She appears well-developed and well-nourished. No distress.   HENT:   Head: Normocephalic and atraumatic.   Eyes: Conjunctivae are normal.   PERRL   Neck: Neck supple. No JVD present.   Cardiovascular: Regular rhythm and normal heart sounds. Bradycardia present.   Pulmonary/Chest: Effort normal and breath sounds normal.   Abdominal: Soft. Bowel sounds are normal. She exhibits no distension. There is no tenderness.   Genitourinary:   Genitourinary Comments: deferred   Musculoskeletal: Normal range of motion. She exhibits edema (+1 bilateral lower extremity).   Neurological: She is alert and oriented to person, place, and time.   Skin: Skin is warm and dry.   Psychiatric: She has a normal mood and affect. Her behavior is normal. Thought content normal.   Nursing note and vitals reviewed.      Significant Labs:   CBC:   Recent Labs   Lab 10/25/19  0950 10/26/19  0503   WBC 7.85 6.85   HGB 13.0 12.4   HCT 39.7 37.6   * 132*     CMP:   Recent Labs   Lab 10/25/19  0950 10/26/19  0503    143   K 3.9 4.8    109   CO2 24 21*   * 90   BUN 19 15   CREATININE 1.9* 1.8*   CALCIUM 9.8 9.4   PROT 6.7  --    ALBUMIN 3.6  --    BILITOT 0.6  --    ALKPHOS 101  --    AST 24  --    ALT 18  --    ANIONGAP 11 13   EGFRNONAA 23* 25*     Magnesium:   Recent Labs   Lab 10/25/19  1538 10/26/19  0503   MG 2.0 2.0     Troponin:   Recent Labs   Lab 10/25/19  0950 10/25/19  1538 10/25/19  2029   TROPONINI 0.028* 0.032* 0.030*     TSH:   Recent Labs   Lab 10/25/19  1538   TSH 0.866       Significant Imaging:   Imaging Results          X-Ray Chest AP Portable (Final result)  Result time 10/25/19 10:06:15    Final result by Mariela Marcano MD (10/25/19 10:06:15)                 Impression:      Tiny left pleural effusion.    Prior sternotomy.      Electronically signed by: Mariela  Jeet  Date:    10/25/2019  Time:    10:06             Narrative:    EXAMINATION:  XR CHEST AP PORTABLE    CLINICAL HISTORY:  chest pain;    COMPARISON:  No priors    FINDINGS:  No infiltrate or effusion identified.  There has been prior sternotomy.  Cardiac silhouette is within normal limits.  There is a tiny left pleural effusion.

## 2019-10-26 NOTE — ASSESSMENT & PLAN NOTE
-Continue PPI.   -pt was prescribed Nexium by PCP but felt flushed after taking and concerned for reaction  -pt denies sxs on Protonix   -Maalox as needed.

## 2019-10-26 NOTE — HOSPITAL COURSE
Pt admitted to Observation Unit for Chest Pain with mildly elevated troponin.  BNP of 289 also noted with chest xray showing tiny left pleural effusion and previous sternotomy.  Bradycardia noted with beta blocker held.  Creatinine elevated with ARB held and mild improvement noted.  On 10/27/19, pt reports improved chest pain.  Creatinine increased with mild hyperkalemia noted.  Pt verbalized symptom improvement post Lasix x 1 dose.  Echo results showed EF 55% with diastolic dysfunction and mild AVR.  Bradycardia improved with HR 57-62.  PT/OT evaluation ordered.  CM consulted for discharge planning and to establish home health.  On 10/28/19, creatinine trending down.  Lopressor held due to bradycardia and ARB held due to decreased kidney function.  Pt seen and examined on the date of discharge and deemed suitable for discharge to home accompanied by children with home health established.  Pt denies pain and nausea with no signs of acute distress noted.  Current medications resumed with Buspar, Norco, Lopressor, and Exforge discontinued and Tramadol dose adjusted.  Pt/family instructed to maintain compliance with fluid/dietary restrictions with understanding verbalized.  Instructed to follow up with PCP, Cardiology, and Nephrology for further evaluation.

## 2019-10-26 NOTE — ASSESSMENT & PLAN NOTE
-hx of noted   -- imaging showing mild pleural effusion and trace edema to BLE   -Lasix x 1 dose given   -creatinine mildly elevated   -BB held due to Bradycardia   -Echo results pending   -supplemental oxygen as needed   -daily weights   -strict I/Os  -low sodium diet with fluid restriction

## 2019-10-26 NOTE — ASSESSMENT & PLAN NOTE
-Could be contributing to patient's weakness.   -mild improvement   -Hold metoprolol.   -TSH within normal limits   -will consult Cardiology as needed

## 2019-10-26 NOTE — PLAN OF CARE
Patient alert and oriented x 4. Free from falls or injury during shift. Cardiac rhythm is bradycardia. Asymptomatic. No complaints of pain. Nausea resolved with po Zofran. Daughter at bedside. Plan of care reviewed with patient. Bed in low position, side rails up x 2, call light in reach. Will continue to monitor.

## 2019-10-26 NOTE — PROGRESS NOTES
[facility] Hospital Medicine  Progress Note    Patient Name: Danielle Cruz  MRN: [id number]  Patient Class: OP- Observation   Admission Date: 10/25/2019  Length of Stay: 0 days  Attending Physician: Dunia Martinez MD  Primary Care Provider: Jan Qureshi MD        Subjective:     Principal Problem:Chest pain        HPI:  Danielle Cruz is an 88 year old female with CAD status post CABG who presented to the ED with complaints of chest tightness that began early this morning while she was awake due to reflux symptoms She reports that the chest tightness is different than her reflux, located in her mid chest. Symptoms lasted approximately 2 hours and resolved spontaneously. She reports chronic SOB due to CHF, but states that this did not worse with her chest tightness. She notes nausea associated with her reflux, but no change. There was associated generalized weakness, but this improved after she ate oatmeal. She denies diaphoresis and cough. In the ED, the patient's initial troponin was elevated to 0.028. Other labs were significant for a creatinine of 1.9 which is consistent with her baseline on 8/18/19. and BNP of 286. Code status was discussed with the patient and her son, Leland Cruz Jr. She is a full code and her son is his surrogate medical decision maker.     Overview/Hospital Course:  Pt admitted to Observation Unit for Chest Pain with mildly elevated troponin.  BNP of 289 also noted with chest xray showing tiny left pleural effusion and previous sternotomy.  Bradycardia noted with beta blocker held.  Creatinine elevated with ARB held and mild improvement noted.      Interval History: pt continues to report nausea, decreased appetite, and weakness with CP and SOB improved.  Bradycardia noted on monitor with beta blocker held.  Creatinine improved with ARB held.      Review of Systems   Constitutional: Negative for appetite change, chills, diaphoresis, fatigue and fever.   HENT:  Negative for congestion, ear pain, mouth sores, sore throat and trouble swallowing.    Eyes: Negative for pain and visual disturbance.   Respiratory: Positive for chest tightness (improved ) and shortness of breath. Negative for cough.    Cardiovascular: Negative for chest pain, palpitations and leg swelling.   Gastrointestinal: Positive for nausea. Negative for abdominal pain and constipation.        Positive for reflux.    Endocrine: Negative for cold intolerance, heat intolerance, polydipsia and polyuria.   Genitourinary: Negative for dysuria, frequency and hematuria.   Musculoskeletal: Negative for arthralgias, back pain, myalgias and neck pain.   Skin: Negative for pallor, rash and wound.   Allergic/Immunologic: Negative for environmental allergies and immunocompromised state.   Neurological: Positive for weakness (generalized). Negative for dizziness, seizures, syncope, numbness and headaches.   Hematological: Negative for adenopathy. Does not bruise/bleed easily.   Psychiatric/Behavioral: Negative for agitation, confusion and sleep disturbance.     Objective:     Vital Signs (Most Recent):  Temp: 97.5 °F (36.4 °C) (10/26/19 1213)  Pulse: (!) 50 (10/26/19 1213)  Resp: 16 (10/26/19 0812)  BP: 137/65 (10/26/19 1213)  SpO2: (!) 92 % (10/26/19 1213) Vital Signs (24h Range):  Temp:  [97.5 °F (36.4 °C)-98.3 °F (36.8 °C)] 97.5 °F (36.4 °C)  Pulse:  [49-54] 50  Resp:  [16-18] 16  SpO2:  [92 %-97 %] 92 %  BP: (128-149)/(59-73) 137/65     Weight: 62.3 kg (137 lb 4.8 oz)  Body mass index is 25.11 kg/m².    Intake/Output Summary (Last 24 hours) at 10/26/2019 1519  Last data filed at 10/26/2019 0500  Gross per 24 hour   Intake 290 ml   Output --   Net 290 ml      Physical Exam   Constitutional: She is oriented to person, place, and time. She appears well-developed and well-nourished. No distress.   HENT:   Head: Normocephalic and atraumatic.   Eyes: Conjunctivae are normal.   PERRL   Neck: Neck supple. No JVD present.    Cardiovascular: Regular rhythm and normal heart sounds. Bradycardia present.   Pulmonary/Chest: Effort normal and breath sounds normal.   Abdominal: Soft. Bowel sounds are normal. She exhibits no distension. There is no tenderness.   Genitourinary:   Genitourinary Comments: deferred   Musculoskeletal: Normal range of motion. She exhibits edema (+1 bilateral lower extremity).   Neurological: She is alert and oriented to person, place, and time.   Skin: Skin is warm and dry.   Psychiatric: She has a normal mood and affect. Her behavior is normal. Thought content normal.   Nursing note and vitals reviewed.      Significant Labs:   CBC:   Recent Labs   Lab 10/25/19  0950 10/26/19  0503   WBC 7.85 6.85   HGB 13.0 12.4   HCT 39.7 37.6   * 132*     CMP:   Recent Labs   Lab 10/25/19  0950 10/26/19  0503    143   K 3.9 4.8    109   CO2 24 21*   * 90   BUN 19 15   CREATININE 1.9* 1.8*   CALCIUM 9.8 9.4   PROT 6.7  --    ALBUMIN 3.6  --    BILITOT 0.6  --    ALKPHOS 101  --    AST 24  --    ALT 18  --    ANIONGAP 11 13   EGFRNONAA 23* 25*     Magnesium:   Recent Labs   Lab 10/25/19  1538 10/26/19  0503   MG 2.0 2.0     Troponin:   Recent Labs   Lab 10/25/19  0950 10/25/19  1538 10/25/19  2029   TROPONINI 0.028* 0.032* 0.030*     TSH:   Recent Labs   Lab 10/25/19  1538   TSH 0.866       Significant Imaging:   Imaging Results          X-Ray Chest AP Portable (Final result)  Result time 10/25/19 10:06:15    Final result by Mariela Marcano MD (10/25/19 10:06:15)                 Impression:      Tiny left pleural effusion.    Prior sternotomy.      Electronically signed by: Mariela Marcano  Date:    10/25/2019  Time:    10:06             Narrative:    EXAMINATION:  XR CHEST AP PORTABLE    CLINICAL HISTORY:  chest pain;    COMPARISON:  No priors    FINDINGS:  No infiltrate or effusion identified.  There has been prior sternotomy.  Cardiac silhouette is within normal limits.  There is a tiny  left pleural effusion.                                Assessment/Plan:      * Chest pain with elevated troponin  -Pain could be related to reflux and elevated troponin is most likely related to CHF. Low suspicion for ACS.   -Trend troponin- remained flat likely related to demand-with mild elevation noted    -Continue ASA and statin.   -Hold metoprolol due to bradycardia.   -home medications resumed- with BB held due to bradycardia and ARB held due to decreased kidney function       CHF (congestive heart failure)  -hx of noted   -- imaging showing mild pleural effusion and trace edema to BLE   -Lasix x 1 dose given   -creatinine mildly elevated   -BB held due to Bradycardia   -Echo results pending   -supplemental oxygen as needed   -daily weights   -strict I/Os  -low sodium diet with fluid restriction         Bradycardia  -Could be contributing to patient's weakness.   -mild improvement   -Hold metoprolol.   -TSH within normal limits   -will consult Cardiology as needed       Hyperlipemia  Continue statin.       GERD (gastroesophageal reflux disease)  -Continue PPI.   -pt was prescribed Nexium by PCP but felt flushed after taking and concerned for reaction  -pt denies sxs on Protonix   -Maalox as needed.       Hypertension  -Stable.   -Continue Exforge and hydralazine.       Hypothyroidism  -TSH within normal limits   -Continue levothyroxine.       History of CVA (cerebrovascular accident)  -Stable.   -Continue ASA and statin.         VTE Risk Mitigation (From admission, onward)         Ordered     heparin (porcine) injection 5,000 Units  Every 8 hours      10/25/19 1550                      Esthela Appiah NP  Department of Hospital Medicine   [facility]

## 2019-10-26 NOTE — ASSESSMENT & PLAN NOTE
-Pain could be related to reflux and elevated troponin is most likely related to CHF. Low suspicion for ACS.   -Trend troponin- remained flat likely related to demand-with mild elevation noted    -Continue ASA and statin.   -Hold metoprolol due to bradycardia.   -home medications resumed- with BB held due to bradycardia and ARB held due to decreased kidney function

## 2019-10-27 LAB
ANION GAP SERPL CALC-SCNC: 11 MMOL/L (ref 8–16)
AORTIC VALVE STENOSIS: ABNORMAL
BASOPHILS # BLD AUTO: 0.03 K/UL (ref 0–0.2)
BASOPHILS NFR BLD: 0.4 % (ref 0–1.9)
BUN SERPL-MCNC: 15 MG/DL (ref 8–23)
CALCIUM SERPL-MCNC: 9.8 MG/DL (ref 8.7–10.5)
CHLORIDE SERPL-SCNC: 106 MMOL/L (ref 95–110)
CO2 SERPL-SCNC: 27 MMOL/L (ref 23–29)
CREAT SERPL-MCNC: 2.1 MG/DL (ref 0.5–1.4)
DIASTOLIC DYSFUNCTION: YES
DIFFERENTIAL METHOD: ABNORMAL
EOSINOPHIL # BLD AUTO: 0.1 K/UL (ref 0–0.5)
EOSINOPHIL NFR BLD: 1.8 % (ref 0–8)
ERYTHROCYTE [DISTWIDTH] IN BLOOD BY AUTOMATED COUNT: 13.1 % (ref 11.5–14.5)
EST. GFR  (AFRICAN AMERICAN): 24 ML/MIN/1.73 M^2
EST. GFR  (NON AFRICAN AMERICAN): 21 ML/MIN/1.73 M^2
ESTIMATED PA SYSTOLIC PRESSURE: 37.45
GLUCOSE SERPL-MCNC: 105 MG/DL (ref 70–110)
HCT VFR BLD AUTO: 37.9 % (ref 37–48.5)
HGB BLD-MCNC: 12.4 G/DL (ref 12–16)
IMM GRANULOCYTES # BLD AUTO: 0.04 K/UL (ref 0–0.04)
IMM GRANULOCYTES NFR BLD AUTO: 0.5 % (ref 0–0.5)
LYMPHOCYTES # BLD AUTO: 2.4 K/UL (ref 1–4.8)
LYMPHOCYTES NFR BLD: 32.6 % (ref 18–48)
MAGNESIUM SERPL-MCNC: 2.1 MG/DL (ref 1.6–2.6)
MCH RBC QN AUTO: 32.2 PG (ref 27–31)
MCHC RBC AUTO-ENTMCNC: 32.7 G/DL (ref 32–36)
MCV RBC AUTO: 98 FL (ref 82–98)
MITRAL VALVE MOBILITY: ABNORMAL
MONOCYTES # BLD AUTO: 0.8 K/UL (ref 0.3–1)
MONOCYTES NFR BLD: 11.2 % (ref 4–15)
NEUTROPHILS # BLD AUTO: 3.9 K/UL (ref 1.8–7.7)
NEUTROPHILS NFR BLD: 53.5 % (ref 38–73)
NRBC BLD-RTO: 0 /100 WBC
PHOSPHATE SERPL-MCNC: 3.4 MG/DL (ref 2.7–4.5)
PLATELET # BLD AUTO: 129 K/UL (ref 150–350)
PMV BLD AUTO: 11.7 FL (ref 9.2–12.9)
POTASSIUM SERPL-SCNC: 5.3 MMOL/L (ref 3.5–5.1)
RBC # BLD AUTO: 3.85 M/UL (ref 4–5.4)
RETIRED EF AND QEF - SEE NOTES: 55 (ref 55–65)
SODIUM SERPL-SCNC: 144 MMOL/L (ref 136–145)
TRICUSPID VALVE REGURGITATION: ABNORMAL
WBC # BLD AUTO: 7.34 K/UL (ref 3.9–12.7)

## 2019-10-27 PROCEDURE — 25000003 PHARM REV CODE 250: Performed by: PHYSICIAN ASSISTANT

## 2019-10-27 PROCEDURE — 63600175 PHARM REV CODE 636 W HCPCS: Performed by: PHYSICIAN ASSISTANT

## 2019-10-27 PROCEDURE — 84100 ASSAY OF PHOSPHORUS: CPT

## 2019-10-27 PROCEDURE — 93306 2D ECHO WITH COLOR FLOW DOPPLER: ICD-10-PCS | Mod: 26,,, | Performed by: INTERNAL MEDICINE

## 2019-10-27 PROCEDURE — 96372 THER/PROPH/DIAG INJ SC/IM: CPT

## 2019-10-27 PROCEDURE — 93306 TTE W/DOPPLER COMPLETE: CPT

## 2019-10-27 PROCEDURE — G0378 HOSPITAL OBSERVATION PER HR: HCPCS

## 2019-10-27 PROCEDURE — 80048 BASIC METABOLIC PNL TOTAL CA: CPT

## 2019-10-27 PROCEDURE — 94761 N-INVAS EAR/PLS OXIMETRY MLT: CPT

## 2019-10-27 PROCEDURE — 63600175 PHARM REV CODE 636 W HCPCS: Performed by: NURSE PRACTITIONER

## 2019-10-27 PROCEDURE — 93306 TTE W/DOPPLER COMPLETE: CPT | Mod: 26,,, | Performed by: INTERNAL MEDICINE

## 2019-10-27 PROCEDURE — 85025 COMPLETE CBC W/AUTO DIFF WBC: CPT

## 2019-10-27 PROCEDURE — 36415 COLL VENOUS BLD VENIPUNCTURE: CPT

## 2019-10-27 PROCEDURE — 25000003 PHARM REV CODE 250: Performed by: NURSE PRACTITIONER

## 2019-10-27 PROCEDURE — 83735 ASSAY OF MAGNESIUM: CPT

## 2019-10-27 RX ORDER — CYANOCOBALAMIN 1000 UG/ML
1000 INJECTION, SOLUTION INTRAMUSCULAR; SUBCUTANEOUS ONCE
Status: COMPLETED | OUTPATIENT
Start: 2019-10-27 | End: 2019-10-27

## 2019-10-27 RX ADMIN — ATORVASTATIN CALCIUM 40 MG: 40 TABLET, FILM COATED ORAL at 09:10

## 2019-10-27 RX ADMIN — ONDANSETRON 8 MG: 8 TABLET, ORALLY DISINTEGRATING ORAL at 06:10

## 2019-10-27 RX ADMIN — HEPARIN SODIUM 5000 UNITS: 5000 INJECTION INTRAVENOUS; SUBCUTANEOUS at 01:10

## 2019-10-27 RX ADMIN — HEPARIN SODIUM 5000 UNITS: 5000 INJECTION INTRAVENOUS; SUBCUTANEOUS at 09:10

## 2019-10-27 RX ADMIN — ALUMINUM HYDROXIDE, MAGNESIUM HYDROXIDE, AND SIMETHICONE 30 ML: 200; 200; 20 SUSPENSION ORAL at 04:10

## 2019-10-27 RX ADMIN — LEVOTHYROXINE SODIUM 25 MCG: 25 TABLET ORAL at 06:10

## 2019-10-27 RX ADMIN — TRAMADOL HYDROCHLORIDE 50 MG: 50 TABLET, COATED ORAL at 10:10

## 2019-10-27 RX ADMIN — HYDRALAZINE HYDROCHLORIDE 25 MG: 25 TABLET, FILM COATED ORAL at 08:10

## 2019-10-27 RX ADMIN — BUSPIRONE HYDROCHLORIDE 15 MG: 10 TABLET ORAL at 09:10

## 2019-10-27 RX ADMIN — AMLODIPINE BESYLATE 5 MG: 5 TABLET ORAL at 08:10

## 2019-10-27 RX ADMIN — CYANOCOBALAMIN 1000 MCG: 1000 INJECTION, SOLUTION INTRAMUSCULAR; SUBCUTANEOUS at 01:10

## 2019-10-27 RX ADMIN — TRAMADOL HYDROCHLORIDE 50 MG: 50 TABLET, COATED ORAL at 05:10

## 2019-10-27 RX ADMIN — BUSPIRONE HYDROCHLORIDE 15 MG: 10 TABLET ORAL at 03:10

## 2019-10-27 RX ADMIN — ASPIRIN 81 MG: 81 TABLET, COATED ORAL at 08:10

## 2019-10-27 RX ADMIN — HYDRALAZINE HYDROCHLORIDE 25 MG: 25 TABLET, FILM COATED ORAL at 09:10

## 2019-10-27 RX ADMIN — PANTOPRAZOLE SODIUM 40 MG: 40 TABLET, DELAYED RELEASE ORAL at 08:10

## 2019-10-27 RX ADMIN — ALUMINUM HYDROXIDE, MAGNESIUM HYDROXIDE, AND SIMETHICONE 30 ML: 200; 200; 20 SUSPENSION ORAL at 05:10

## 2019-10-27 RX ADMIN — BUSPIRONE HYDROCHLORIDE 15 MG: 10 TABLET ORAL at 08:10

## 2019-10-27 NOTE — ASSESSMENT & PLAN NOTE
-improved   -Pain could be related to reflux and elevated troponin is most likely related to CHF. Low suspicion for ACS.   -Trend troponin- remained flat likely related to demand-with mild elevation noted    -Continue ASA and statin.   -Hold metoprolol due to bradycardia.   -home medications resumed- with BB held due to bradycardia and ARB held due to decreased kidney function

## 2019-10-27 NOTE — PLAN OF CARE
Patient selected [facility]        10/27/19 1364   Post-Acute Status   Post-Acute Authorization Home Health/Hospice   Home Health/Hospice Status Referrals Sent

## 2019-10-27 NOTE — PLAN OF CARE
Patient had an uneventful day.  Patient did complain of heartburn in which she was given maloxx for and tramodol for her back pain.  Plan is to discharge tomorrow after cardiology and PT consults.  Family stated at the bedside most of day.

## 2019-10-27 NOTE — PROGRESS NOTES
[facility] Hospital Medicine  Progress Note    Patient Name: Danielle Cruz  MRN: [id number]  Patient Class: OP- Observation   Admission Date: 10/25/2019  Length of Stay: 0 days  Attending Physician: Dunia Martinez MD  Primary Care Provider: Jan Qureshi MD        Subjective:     Principal Problem:Chest pain        HPI:  Danielle Cruz is an 88 year old female with CAD status post CABG who presented to the ED with complaints of chest tightness that began early this morning while she was awake due to reflux symptoms She reports that the chest tightness is different than her reflux, located in her mid chest. Symptoms lasted approximately 2 hours and resolved spontaneously. She reports chronic SOB due to CHF, but states that this did not worse with her chest tightness. She notes nausea associated with her reflux, but no change. There was associated generalized weakness, but this improved after she ate oatmeal. She denies diaphoresis and cough. In the ED, the patient's initial troponin was elevated to 0.028. Other labs were significant for a creatinine of 1.9 which is consistent with her baseline on 8/18/19. and BNP of 286. Code status was discussed with the patient and her son, Leland Cruz Jr. She is a full code and her son is his surrogate medical decision maker.     Overview/Hospital Course:  Pt admitted to Observation Unit for Chest Pain with mildly elevated troponin.  BNP of 289 also noted with chest xray showing tiny left pleural effusion and previous sternotomy.  Bradycardia noted with beta blocker held.  Creatinine elevated with ARB held and mild improvement noted.  On 10/27/19, pt reports improved chest pain.  Creatinine increased with mild hyperkalemia noted.  Pt verbalized symptom improvement post Lasix x 1 dose.  Echo results showed EF 55% with diastolic dysfunction and mild AVR.  Bradycardia improved with HR 57-62.  PT/OT evaluation ordered.  CM consulted for discharge planning and  to establish home health.      Interval History: pt verbalized symptom improvement.  PPI continued with CP likely related to GERD.  Echo results reviewed. PT/OT evaluation ordered.  CM consulted to assist with home health.  Bradycardia improved with (HR 57-62). Mild hyperkalemia in the setting of elevated creatinine.      Review of Systems   Constitutional: Negative for appetite change, chills, diaphoresis, fatigue and fever.   HENT: Negative for congestion, ear pain, mouth sores, sore throat and trouble swallowing.    Eyes: Negative for pain and visual disturbance.   Respiratory: Negative for cough, chest tightness and shortness of breath.    Cardiovascular: Negative for chest pain, palpitations and leg swelling.   Gastrointestinal: Negative for abdominal pain, constipation and nausea.        Positive for reflux.    Endocrine: Negative for cold intolerance, heat intolerance, polydipsia and polyuria.   Genitourinary: Negative for dysuria, frequency and hematuria.   Musculoskeletal: Positive for back pain. Negative for arthralgias, myalgias and neck pain.   Skin: Negative for pallor, rash and wound.   Allergic/Immunologic: Negative for environmental allergies and immunocompromised state.   Neurological: Positive for weakness (generalized). Negative for dizziness, seizures, syncope, numbness and headaches.   Hematological: Negative for adenopathy. Does not bruise/bleed easily.   Psychiatric/Behavioral: Negative for agitation, confusion and sleep disturbance.     Objective:     Vital Signs (Most Recent):  Temp: 97.7 °F (36.5 °C) (10/27/19 1225)  Pulse: (!) 57 (10/27/19 1225)  Resp: 20 (10/27/19 0736)  BP: 137/62 (10/27/19 1225)  SpO2: 96 % (10/27/19 1225) Vital Signs (24h Range):  Temp:  [96.7 °F (35.9 °C)-98.7 °F (37.1 °C)] 97.7 °F (36.5 °C)  Pulse:  [50-72] 57  Resp:  [18-20] 20  SpO2:  [95 %-100 %] 96 %  BP: (121-142)/(57-66) 137/62     Weight: 63.6 kg (140 lb 3.4 oz)  Body mass index is 25.65  kg/m².    Intake/Output Summary (Last 24 hours) at 10/27/2019 1428  Last data filed at 10/27/2019 0600  Gross per 24 hour   Intake 600 ml   Output 650 ml   Net -50 ml      Physical Exam   Constitutional: She is oriented to person, place, and time. She appears well-developed and well-nourished. No distress.   HENT:   Head: Normocephalic and atraumatic.   Eyes: Conjunctivae are normal.   PERRL   Neck: Neck supple. No JVD present.   Cardiovascular: Regular rhythm and normal heart sounds. Bradycardia present.   Pulmonary/Chest: Effort normal and breath sounds normal.   Abdominal: Soft. Bowel sounds are normal. She exhibits no distension. There is no tenderness.   Genitourinary:   Genitourinary Comments: deferred   Musculoskeletal: Normal range of motion. She exhibits edema (+1 bilateral lower extremity-improved ).   Neurological: She is alert and oriented to person, place, and time.   Skin: Skin is warm and dry.   Psychiatric: She has a normal mood and affect. Her behavior is normal. Thought content normal.   Nursing note and vitals reviewed.      Significant Labs:   CBC:   Recent Labs   Lab 10/26/19  0503 10/27/19  0509   WBC 6.85 7.34   HGB 12.4 12.4   HCT 37.6 37.9   * 129*     CMP:   Recent Labs   Lab 10/26/19  0503 10/27/19  0509    144   K 4.8 5.3*    106   CO2 21* 27   GLU 90 105   BUN 15 15   CREATININE 1.8* 2.1*   CALCIUM 9.4 9.8   ANIONGAP 13 11   EGFRNONAA 25* 21*     Magnesium:   Recent Labs   Lab 10/25/19  1538 10/26/19  0503 10/27/19  0509   MG 2.0 2.0 2.1     Troponin:   Recent Labs   Lab 10/25/19  1538 10/25/19  2029   TROPONINI 0.032* 0.030*       Significant Imaging:   Imaging Results          X-Ray Chest AP Portable (Final result)  Result time 10/25/19 10:06:15    Final result by Mariela Marcano MD (10/25/19 10:06:15)                 Impression:      Tiny left pleural effusion.    Prior sternotomy.      Electronically signed by: Mariela  Glendelisa  Date:    10/25/2019  Time:    10:06             Narrative:    EXAMINATION:  XR CHEST AP PORTABLE    CLINICAL HISTORY:  chest pain;    COMPARISON:  No priors    FINDINGS:  No infiltrate or effusion identified.  There has been prior sternotomy.  Cardiac silhouette is within normal limits.  There is a tiny left pleural effusion.                                Assessment/Plan:      * Chest pain with elevated troponin  -improved   -Pain could be related to reflux and elevated troponin is most likely related to CHF. Low suspicion for ACS.   -Trend troponin- remained flat likely related to demand-with mild elevation noted    -Continue ASA and statin.   -Hold metoprolol due to bradycardia.   -home medications resumed- with BB held due to bradycardia and ARB held due to decreased kidney function         CHF (congestive heart failure)  -improved- NAPOLEON hose placed to BLE  -- imaging showing mild pleural effusion and trace edema to BLE   -Lasix x 1 dose given   -creatinine elevated with upward trend noted   -BB held due to Bradycardia   -Echo results showed EF 55% with diastolic dysfunction and mild AVR  -supplemental oxygen as needed   -daily weights   -strict I/Os  -low sodium diet with fluid restriction         Bradycardia  -Could be contributing to patient's weakness.   -mild improvement   -Hold metoprolol.   -TSH within normal limits   -will consult Cardiology as needed       Hyperlipemia  Continue statin.       GERD (gastroesophageal reflux disease)  -Continue PPI.   -pt was prescribed Nexium by PCP but felt flushed after taking and concerned for reaction  -pt denies sxs on Protonix-continued   -Maalox as needed.       Hypertension  -Stable.   -Continue Exforge and hydralazine.       Hypothyroidism  -TSH within normal limits   -Continue levothyroxine.       History of CVA (cerebrovascular accident)  -Stable.   -Continue ASA and statin.         VTE Risk Mitigation (From admission, onward)          Ordered     Place NAPOLEON hose  Until discontinued      10/27/19 1224     heparin (porcine) injection 5,000 Units  Every 8 hours      10/25/19 1091                      Esthela Appiah NP  Department of Hospital Medicine   [facility]

## 2019-10-27 NOTE — ASSESSMENT & PLAN NOTE
-Continue PPI.   -pt was prescribed Nexium by PCP but felt flushed after taking and concerned for reaction  -pt denies sxs on Protonix-continued   -Maalox as needed.

## 2019-10-27 NOTE — SUBJECTIVE & OBJECTIVE
Interval History: pt verbalized symptom improvement.  PPI continued with CP likely related to GERD.  Echo results reviewed. PT/OT evaluation ordered.  CM consulted to assist with home health.  Bradycardia improved with (HR 57-62). Mild hyperkalemia in the setting of elevated creatinine.      Review of Systems   Constitutional: Negative for appetite change, chills, diaphoresis, fatigue and fever.   HENT: Negative for congestion, ear pain, mouth sores, sore throat and trouble swallowing.    Eyes: Negative for pain and visual disturbance.   Respiratory: Negative for cough, chest tightness and shortness of breath.    Cardiovascular: Negative for chest pain, palpitations and leg swelling.   Gastrointestinal: Negative for abdominal pain, constipation and nausea.        Positive for reflux.    Endocrine: Negative for cold intolerance, heat intolerance, polydipsia and polyuria.   Genitourinary: Negative for dysuria, frequency and hematuria.   Musculoskeletal: Positive for back pain. Negative for arthralgias, myalgias and neck pain.   Skin: Negative for pallor, rash and wound.   Allergic/Immunologic: Negative for environmental allergies and immunocompromised state.   Neurological: Positive for weakness (generalized). Negative for dizziness, seizures, syncope, numbness and headaches.   Hematological: Negative for adenopathy. Does not bruise/bleed easily.   Psychiatric/Behavioral: Negative for agitation, confusion and sleep disturbance.     Objective:     Vital Signs (Most Recent):  Temp: 97.7 °F (36.5 °C) (10/27/19 1225)  Pulse: (!) 57 (10/27/19 1225)  Resp: 20 (10/27/19 0736)  BP: 137/62 (10/27/19 1225)  SpO2: 96 % (10/27/19 1225) Vital Signs (24h Range):  Temp:  [96.7 °F (35.9 °C)-98.7 °F (37.1 °C)] 97.7 °F (36.5 °C)  Pulse:  [50-72] 57  Resp:  [18-20] 20  SpO2:  [95 %-100 %] 96 %  BP: (121-142)/(57-66) 137/62     Weight: 63.6 kg (140 lb 3.4 oz)  Body mass index is 25.65 kg/m².    Intake/Output Summary (Last 24 hours) at  10/27/2019 1428  Last data filed at 10/27/2019 0600  Gross per 24 hour   Intake 600 ml   Output 650 ml   Net -50 ml      Physical Exam   Constitutional: She is oriented to person, place, and time. She appears well-developed and well-nourished. No distress.   HENT:   Head: Normocephalic and atraumatic.   Eyes: Conjunctivae are normal.   PERRL   Neck: Neck supple. No JVD present.   Cardiovascular: Regular rhythm and normal heart sounds. Bradycardia present.   Pulmonary/Chest: Effort normal and breath sounds normal.   Abdominal: Soft. Bowel sounds are normal. She exhibits no distension. There is no tenderness.   Genitourinary:   Genitourinary Comments: deferred   Musculoskeletal: Normal range of motion. She exhibits edema (+1 bilateral lower extremity-improved ).   Neurological: She is alert and oriented to person, place, and time.   Skin: Skin is warm and dry.   Psychiatric: She has a normal mood and affect. Her behavior is normal. Thought content normal.   Nursing note and vitals reviewed.      Significant Labs:   CBC:   Recent Labs   Lab 10/26/19  0503 10/27/19  0509   WBC 6.85 7.34   HGB 12.4 12.4   HCT 37.6 37.9   * 129*     CMP:   Recent Labs   Lab 10/26/19  0503 10/27/19  0509    144   K 4.8 5.3*    106   CO2 21* 27   GLU 90 105   BUN 15 15   CREATININE 1.8* 2.1*   CALCIUM 9.4 9.8   ANIONGAP 13 11   EGFRNONAA 25* 21*     Magnesium:   Recent Labs   Lab 10/25/19  1538 10/26/19  0503 10/27/19  0509   MG 2.0 2.0 2.1     Troponin:   Recent Labs   Lab 10/25/19  1538 10/25/19  2029   TROPONINI 0.032* 0.030*       Significant Imaging:   Imaging Results          X-Ray Chest AP Portable (Final result)  Result time 10/25/19 10:06:15    Final result by Mariela Marcano MD (10/25/19 10:06:15)                 Impression:      Tiny left pleural effusion.    Prior sternotomy.      Electronically signed by: Mariela Marcano  Date:    10/25/2019  Time:    10:06             Narrative:     EXAMINATION:  XR CHEST AP PORTABLE    CLINICAL HISTORY:  chest pain;    COMPARISON:  No priors    FINDINGS:  No infiltrate or effusion identified.  There has been prior sternotomy.  Cardiac silhouette is within normal limits.  There is a tiny left pleural effusion.

## 2019-10-28 ENCOUNTER — TELEPHONE (OUTPATIENT)
Dept: GASTROENTEROLOGY | Facility: CLINIC | Age: 84
End: 2019-10-28

## 2019-10-28 ENCOUNTER — TELEPHONE (OUTPATIENT)
Dept: CARDIOLOGY | Facility: CLINIC | Age: 84
End: 2019-10-28

## 2019-10-28 ENCOUNTER — TELEPHONE (OUTPATIENT)
Dept: NEPHROLOGY | Facility: CLINIC | Age: 84
End: 2019-10-28

## 2019-10-28 VITALS
SYSTOLIC BLOOD PRESSURE: 155 MMHG | TEMPERATURE: 97 F | HEIGHT: 62 IN | DIASTOLIC BLOOD PRESSURE: 69 MMHG | OXYGEN SATURATION: 93 % | RESPIRATION RATE: 16 BRPM | WEIGHT: 144.63 LBS | HEART RATE: 59 BPM | BODY MASS INDEX: 26.61 KG/M2

## 2019-10-28 DIAGNOSIS — I10 HYPERTENSION, UNSPECIFIED TYPE: Primary | ICD-10-CM

## 2019-10-28 PROBLEM — I50.33 ACUTE ON CHRONIC DIASTOLIC CONGESTIVE HEART FAILURE: Status: ACTIVE | Noted: 2019-10-26

## 2019-10-28 LAB
ANION GAP SERPL CALC-SCNC: 11 MMOL/L (ref 8–16)
BASOPHILS # BLD AUTO: 0.03 K/UL (ref 0–0.2)
BASOPHILS NFR BLD: 0.4 % (ref 0–1.9)
BUN SERPL-MCNC: 14 MG/DL (ref 8–23)
CALCIUM SERPL-MCNC: 9.4 MG/DL (ref 8.7–10.5)
CHLORIDE SERPL-SCNC: 103 MMOL/L (ref 95–110)
CO2 SERPL-SCNC: 24 MMOL/L (ref 23–29)
CREAT SERPL-MCNC: 2 MG/DL (ref 0.5–1.4)
DIFFERENTIAL METHOD: ABNORMAL
EOSINOPHIL # BLD AUTO: 0.1 K/UL (ref 0–0.5)
EOSINOPHIL NFR BLD: 1.8 % (ref 0–8)
ERYTHROCYTE [DISTWIDTH] IN BLOOD BY AUTOMATED COUNT: 13 % (ref 11.5–14.5)
EST. GFR  (AFRICAN AMERICAN): 25 ML/MIN/1.73 M^2
EST. GFR  (NON AFRICAN AMERICAN): 22 ML/MIN/1.73 M^2
GLUCOSE SERPL-MCNC: 105 MG/DL (ref 70–110)
HCT VFR BLD AUTO: 38.8 % (ref 37–48.5)
HGB BLD-MCNC: 12.5 G/DL (ref 12–16)
IMM GRANULOCYTES # BLD AUTO: 0.02 K/UL (ref 0–0.04)
IMM GRANULOCYTES NFR BLD AUTO: 0.3 % (ref 0–0.5)
LYMPHOCYTES # BLD AUTO: 3 K/UL (ref 1–4.8)
LYMPHOCYTES NFR BLD: 40.5 % (ref 18–48)
MAGNESIUM SERPL-MCNC: 2.1 MG/DL (ref 1.6–2.6)
MCH RBC QN AUTO: 31.9 PG (ref 27–31)
MCHC RBC AUTO-ENTMCNC: 32.2 G/DL (ref 32–36)
MCV RBC AUTO: 99 FL (ref 82–98)
MONOCYTES # BLD AUTO: 0.8 K/UL (ref 0.3–1)
MONOCYTES NFR BLD: 10.9 % (ref 4–15)
NEUTROPHILS # BLD AUTO: 3.4 K/UL (ref 1.8–7.7)
NEUTROPHILS NFR BLD: 46.1 % (ref 38–73)
NRBC BLD-RTO: 0 /100 WBC
PHOSPHATE SERPL-MCNC: 3.8 MG/DL (ref 2.7–4.5)
PLATELET # BLD AUTO: 141 K/UL (ref 150–350)
PMV BLD AUTO: 12.4 FL (ref 9.2–12.9)
POTASSIUM SERPL-SCNC: 4.1 MMOL/L (ref 3.5–5.1)
RBC # BLD AUTO: 3.92 M/UL (ref 4–5.4)
SODIUM SERPL-SCNC: 138 MMOL/L (ref 136–145)
WBC # BLD AUTO: 7.28 K/UL (ref 3.9–12.7)

## 2019-10-28 PROCEDURE — 97116 GAIT TRAINING THERAPY: CPT

## 2019-10-28 PROCEDURE — 83735 ASSAY OF MAGNESIUM: CPT

## 2019-10-28 PROCEDURE — 63600175 PHARM REV CODE 636 W HCPCS: Performed by: EMERGENCY MEDICINE

## 2019-10-28 PROCEDURE — 63600175 PHARM REV CODE 636 W HCPCS: Performed by: PHYSICIAN ASSISTANT

## 2019-10-28 PROCEDURE — 97161 PT EVAL LOW COMPLEX 20 MIN: CPT

## 2019-10-28 PROCEDURE — 36415 COLL VENOUS BLD VENIPUNCTURE: CPT

## 2019-10-28 PROCEDURE — 99900038 HC OT GENERIC THERAPY SCREENING (STAT)

## 2019-10-28 PROCEDURE — 96372 THER/PROPH/DIAG INJ SC/IM: CPT

## 2019-10-28 PROCEDURE — 85025 COMPLETE CBC W/AUTO DIFF WBC: CPT

## 2019-10-28 PROCEDURE — 84100 ASSAY OF PHOSPHORUS: CPT

## 2019-10-28 PROCEDURE — G0378 HOSPITAL OBSERVATION PER HR: HCPCS

## 2019-10-28 PROCEDURE — 90471 IMMUNIZATION ADMIN: CPT | Performed by: EMERGENCY MEDICINE

## 2019-10-28 PROCEDURE — 80048 BASIC METABOLIC PNL TOTAL CA: CPT

## 2019-10-28 PROCEDURE — 90670 PCV13 VACCINE IM: CPT | Performed by: EMERGENCY MEDICINE

## 2019-10-28 PROCEDURE — 99204 PR OFFICE/OUTPT VISIT, NEW, LEVL IV, 45-59 MIN: ICD-10-PCS | Mod: ,,, | Performed by: INTERNAL MEDICINE

## 2019-10-28 PROCEDURE — G0009 ADMIN PNEUMOCOCCAL VACCINE: HCPCS | Performed by: EMERGENCY MEDICINE

## 2019-10-28 PROCEDURE — 25000003 PHARM REV CODE 250: Performed by: NURSE PRACTITIONER

## 2019-10-28 PROCEDURE — 25000003 PHARM REV CODE 250: Performed by: PHYSICIAN ASSISTANT

## 2019-10-28 PROCEDURE — 99204 OFFICE O/P NEW MOD 45 MIN: CPT | Mod: ,,, | Performed by: INTERNAL MEDICINE

## 2019-10-28 RX ORDER — AMLODIPINE BESYLATE 5 MG/1
5 TABLET ORAL DAILY
Qty: 30 TABLET | Refills: 11 | Status: SHIPPED | OUTPATIENT
Start: 2019-10-29 | End: 2019-10-28

## 2019-10-28 RX ORDER — AMLODIPINE BESYLATE 5 MG/1
5 TABLET ORAL DAILY
Qty: 30 TABLET | Refills: 0 | Status: SHIPPED | OUTPATIENT
Start: 2019-10-29 | End: 2019-11-05 | Stop reason: SDUPTHER

## 2019-10-28 RX ORDER — TRAMADOL HYDROCHLORIDE 50 MG/1
50 TABLET ORAL EVERY 8 HOURS PRN
Qty: 12 TABLET | Refills: 0 | Status: SHIPPED | OUTPATIENT
Start: 2019-10-28

## 2019-10-28 RX ORDER — PANTOPRAZOLE SODIUM 40 MG/1
40 TABLET, DELAYED RELEASE ORAL DAILY
Qty: 30 TABLET | Refills: 0 | Status: SHIPPED | OUTPATIENT
Start: 2019-10-29 | End: 2019-11-04

## 2019-10-28 RX ORDER — MAG HYDROX/ALUMINUM HYD/SIMETH 200-200-20
30 SUSPENSION, ORAL (FINAL DOSE FORM) ORAL EVERY 6 HOURS PRN
COMMUNITY
Start: 2019-10-28 | End: 2020-10-27

## 2019-10-28 RX ADMIN — ASPIRIN 81 MG: 81 TABLET, COATED ORAL at 09:10

## 2019-10-28 RX ADMIN — ALUMINUM HYDROXIDE, MAGNESIUM HYDROXIDE, AND SIMETHICONE 30 ML: 200; 200; 20 SUSPENSION ORAL at 12:10

## 2019-10-28 RX ADMIN — AMLODIPINE BESYLATE 5 MG: 5 TABLET ORAL at 09:10

## 2019-10-28 RX ADMIN — BUSPIRONE HYDROCHLORIDE 15 MG: 10 TABLET ORAL at 09:10

## 2019-10-28 RX ADMIN — HYDRALAZINE HYDROCHLORIDE 25 MG: 25 TABLET, FILM COATED ORAL at 09:10

## 2019-10-28 RX ADMIN — ONDANSETRON 8 MG: 8 TABLET, ORALLY DISINTEGRATING ORAL at 05:10

## 2019-10-28 RX ADMIN — PANTOPRAZOLE SODIUM 40 MG: 40 TABLET, DELAYED RELEASE ORAL at 09:10

## 2019-10-28 RX ADMIN — HEPARIN SODIUM 5000 UNITS: 5000 INJECTION INTRAVENOUS; SUBCUTANEOUS at 05:10

## 2019-10-28 RX ADMIN — PNEUMOCOCCAL 13-VALENT CONJUGATE VACCINE 0.5 ML: 2.2; 2.2; 2.2; 2.2; 2.2; 4.4; 2.2; 2.2; 2.2; 2.2; 2.2; 2.2; 2.2 INJECTION, SUSPENSION INTRAMUSCULAR at 12:10

## 2019-10-28 RX ADMIN — LEVOTHYROXINE SODIUM 25 MCG: 25 TABLET ORAL at 05:10

## 2019-10-28 NOTE — TELEPHONE ENCOUNTER
----- Message from Manda Mcbride RN sent at 10/28/2019 12:17 PM CDT -----  Pt needs a hospital follow up appointment in 1  week.  Please call pt with date and time of arranged appointment.  Thanks.

## 2019-10-28 NOTE — NURSING
Went over discharge instructions with patient.   Stressed importance of making and keeping all follow ups as well as making prescribed medication changes.   1 prescription sent to pt's requested pharmacy.  2 paper prescriptions given to pt.  Patient verbalized understanding and has had all questions in regards to discharge answered to satisfaction.  IV removed without complications.  Telemetry box removed and returned to monitor tech.  Patient awaiting personal transportation, instructed to call nurse station once ride has arrived.  Primary nurse notified of pt's discharge status.

## 2019-10-28 NOTE — DISCHARGE INSTRUCTIONS
Dash diet, 2 gm sodium restriction  -1500 ml daily fluid restriction  -Daily weights  -Strict intake and output

## 2019-10-28 NOTE — NURSING
Downstairs via wheelchair with nurse tech to private transportation home. Belongings and AVS sent with patient.

## 2019-10-28 NOTE — PT/OT/SLP PROGRESS
Occupational Therapy      Patient Name:  Danielle Cruz   MRN:  [id number]  Eval initiated via chart review. Nurse reported in discharged in progress. Daughter and pt in agreement with allowing next level of care to perform assessment for OT.  Thalia Alaniz, OT  10/28/2019   1203

## 2019-10-28 NOTE — DISCHARGE SUMMARY
[facility] Hospital Medicine  Discharge Summary      Patient Name: Danielle Cruz  MRN: [id number]  Admission Date: 10/25/2019  Hospital Length of Stay: 0 days  Discharge Date and Time: 10/28/2019  2:08 PM  Attending Physician: Dr. Campos Laughlin   Discharging Provider: Esthela Appiah NP  Primary Care Provider: Jan Qureshi MD      HPI:   Danielle Cruz is an 88 year old female with CAD status post CABG who presented to the ED with complaints of chest tightness that began early this morning while she was awake due to reflux symptoms She reports that the chest tightness is different than her reflux, located in her mid chest. Symptoms lasted approximately 2 hours and resolved spontaneously. She reports chronic SOB due to CHF, but states that this did not worse with her chest tightness. She notes nausea associated with her reflux, but no change. There was associated generalized weakness, but this improved after she ate oatmeal. She denies diaphoresis and cough. In the ED, the patient's initial troponin was elevated to 0.028. Other labs were significant for a creatinine of 1.9 which is consistent with her baseline on 8/18/19. and BNP of 286. Code status was discussed with the patient and her son, Leland Cruz Jr. She is a full code and her son is his surrogate medical decision maker.     * No surgery found *      Hospital Course:   Pt admitted to Observation Unit for Chest Pain with mildly elevated troponin.  BNP of 289 also noted with chest xray showing tiny left pleural effusion and previous sternotomy.  Bradycardia noted with beta blocker held.  Creatinine elevated with ARB held and mild improvement noted.  On 10/27/19, pt reports improved chest pain.  Pt recently diagnosed with GERD with Prilosec initiated by PCP.  Pt took one dose with concern for reaction and discontinued.  Pt placed on Protonix during hospitalization with no signs of reaction reported.  Creatinine increased with mild hyperkalemia  noted.  Pt verbalized symptom improvement post Lasix x 1 dose.  Echo results showed EF 55% with diastolic dysfunction and mild AVR.  Beta blocker held with bradycardia improved with HR 57-62.  PT/OT evaluation ordered.  CM consulted for discharge planning and to establish home health.  On 10/28/19, creatinine trending down.  Lopressor held due to bradycardia and ARB held due to decreased kidney function.  Pt seen and examined on the date of discharge and deemed suitable for discharge to home accompanied by children with home health established.  Pt denies pain and nausea with no signs of acute distress noted.  Current medications resumed with Protonix, Norvasc, and Maalox prescribed.  Buspar, Norco, Lopressor, and Exforge discontinued and Tramadol dose adjusted.  Pt/family instructed to maintain compliance with fluid/dietary restrictions with understanding verbalized.  Instructed to follow up with PCP, Cardiology, GI and Nephrology upon discharge for further evaluation.       Consults:   Consults (From admission, onward)        Status Ordering Provider     Inpatient consult to Cardiology  Once     Provider:  Emily Mohan PA-C    Completed NATALIO WALKER     Inpatient consult to Social Work/Case Management  Once     Provider:  (Not yet assigned)    Completed NATALIO WALKER          Final Active Diagnoses:    Diagnosis Date Noted POA    Bradycardia [R00.1] 10/25/2019 Yes    History of CVA (cerebrovascular accident) [Z86.73]  Not Applicable    Hypothyroidism [E03.9]  Yes    Hypertension [I10]  Yes    GERD (gastroesophageal reflux disease) [K21.9]  Yes    Hyperlipemia [E78.5]  Yes      Problems Resolved During this Admission:    Diagnosis Date Noted Date Resolved POA    PRINCIPAL PROBLEM:  Chest pain with elevated troponin [R07.9] 10/25/2019 10/30/2019 Yes    Acute on chronic diastolic congestive heart failure [I50.33] 10/26/2019 10/30/2019 Yes       Discharged Condition: stable    Disposition: Home-Health  Care [facility]    Follow Up:  Follow-up Information     Jan Qureshi MD.    Specialty:  Family Medicine  Contact information:  [address] [facility]  [place] [address]  [phone]             LUTHER Merida In 1 week.    Specialty:  Cardiology  Why:  -hospital follow up and evaluation for outpatient stress test   Contact information:  [id number] [place] [address] [address] [place] [address]  [phone]             Alex Mitchell MD In 1 week.    Specialty:  Nephrology  Why:  -hospital follow up and to establish care for CKD  Contact information:  [address]  [place] [address]  [phone]             Maggie Yoder NP In 1 week.    Specialty:  Gastroenterology  Why:  -hospital follow up   Contact information:  [id number] [place] [address] [place]  [place] [address]  [phone]                 Patient Instructions:      Ambulatory referral to Home Health   Referral Priority: Routine Referral Type: Home Health   Referral Reason: Specialty Services Required   Requested Specialty: Home Health Services   Number of Visits Requested: 1     Diet Cardiac   Order Comments: Maintain 1500 mL fluid restriction     Notify your health care provider if you experience any of the following:  temperature >100.4     Notify your health care provider if you experience any of the following:  persistent nausea and vomiting or diarrhea     Notify your health care provider if you experience any of the following:  increased confusion or weakness     Notify your health care provider if you experience any of the following:  persistent dizziness, light-headedness, or visual disturbances     Notify your health care provider if you experience any of the following:  difficulty breathing or increased cough     Activity as tolerated       Significant Diagnostic Studies:       Pending Diagnostic Studies:     None         Medications:  Reconciled Home Medications:      Medication List      START taking these medications    aluminum-magnesium  hydroxide-simethicone 200-200-20 mg/5 mL Susp  Commonly known as:  MAALOX  Take 30 mLs by mouth every 6 (six) hours as needed.     amLODIPine 5 MG tablet  Commonly known as:  NORVASC  Take 1 tablet (5 mg total) by mouth once daily.     pantoprazole 40 MG tablet  Commonly known as:  PROTONIX  Take 1 tablet (40 mg total) by mouth once daily.  Replaces:  omeprazole 20 MG tablet        CHANGE how you take these medications    traMADol 50 mg tablet  Commonly known as:  ULTRAM  Take 1 tablet (50 mg total) by mouth every 8 (eight) hours as needed for Pain.  What changed:  when to take this        CONTINUE taking these medications    aspirin 81 MG EC tablet  Commonly known as:  ECOTRIN  Take 81 mg by mouth once daily.     atorvastatin 40 MG tablet  Commonly known as:  LIPITOR  Take 40 mg by mouth nightly.     furosemide 20 MG tablet  Commonly known as:  LASIX  Take 20 mg by mouth daily as needed.     hydrALAZINE 25 MG tablet  Commonly known as:  APRESOLINE  Take 25 mg by mouth once daily.     levothyroxine 25 MCG tablet  Commonly known as:  SYNTHROID  Take 25 mcg by mouth once daily.        STOP taking these medications    amlodipine-valsartan 5-160 mg per tablet  Commonly known as:  EXFORGE     busPIRone 15 MG tablet  Commonly known as:  BUSPAR     HYDROcodone-acetaminophen 5-325 mg per tablet  Commonly known as:  NORCO     metoprolol succinate 100 MG 24 hr tablet  Commonly known as:  TOPROL-XL     omeprazole 20 MG tablet  Commonly known as:  PRILOSEC OTC  Replaced by:  pantoprazole 40 MG tablet            Indwelling Lines/Drains at time of discharge:   Lines/Drains/Airways     None                 Time spent on the discharge of patient: > 45 minutes  Patient was seen and examined on the date of discharge and determined to be suitable for discharge.         Esthela Appiah NP  Department of Hospital Medicine  [facility]

## 2019-10-28 NOTE — TELEPHONE ENCOUNTER
----- Message from Manda Mcbride RN sent at 10/28/2019 12:37 PM CDT -----  Pt will be a new patient and needs a hospital follow up appointment in 1  week.  Please call pt with date and time of arranged appointment.  Thanks.

## 2019-10-28 NOTE — PT/OT/SLP EVAL
Physical Therapy Evaluation    Patient Name:  Danielle Cruz   MRN:  [id number]    Recommendations:     Discharge Recommendations:  home health PT   Discharge Equipment Recommendations: none   Barriers to discharge: None    Assessment:     Danielle Cruz is a 88 y.o. female admitted with a medical diagnosis of Chest pain.  She presents with the following impairments/functional limitations:  weakness, gait instability, impaired balance, impaired endurance, impaired functional mobilty, impaired self care skills, decreased lower extremity function, decreased upper extremity function .    Rehab Prognosis: Good; patient would benefit from acute skilled PT services to address these deficits and reach maximum level of function.    Recent Surgery: * No surgery found *      Plan:     During this hospitalization, patient to be seen   to address the identified rehab impairments via gait training, therapeutic activities, therapeutic exercises and progress toward the following goals:    · Plan of Care Expires:  11/04/19    Subjective     Chief Complaint: NAUSEA  Patient/Family Comments/goals: INC MOBILITY   Pain/Comfort:  · Pain Rating 1: 0/10  · Pain Rating Post-Intervention 1: 0/10    Patients cultural, spiritual, Yazidi conflicts given the current situation:      Living Environment:  PT LIVES AT HOME WITH DAUGHTER AND HAS NO STEPS TO ENTER A ONE STORY HOME  Prior to admission, patients level of function was SARAH WITH ROLLATOR AND DOESN'T DRIVE.  Equipment used at home: rollator, walker, rolling, shower chair.  DME owned (not currently used): rolling walker.  Upon discharge, patient will have assistance from FAMILY.    Objective:     Communicated with NURSE GAGE AND Epic CHART REVIEW  prior to session.  Patient found up in chair with peripheral IV, telemetry  upon PT entry to room.    General Precautions: Standard, fall   Orthopedic Precautions:N/A   Braces: N/A     Exams:  · Cognitive Exam:  Patient is  oriented to Person, Place, Time and Situation  · RLE ROM: WFL  · RLE Strength: WFL  · LLE ROM: WFL  · LLE Strength: WFL    Functional Mobility:  PT MET IN RM SEATED IN CHAIR. PT STOOD WITH RW AND GT TRAINED 2X35' WITH STEP TO GT AND MIN A. PT RETURNED TO RM T/F TO CHAIR WITH RW AND MIN A. PT EDUCATED ON ROLE OF P.T. AND LEFT WITH ALL NEEDS MET AND CALL BELL IN REACH.     AM-PAC 6 CLICK MOBILITY  Total Score:16     Patient left up in chair with call button in reach and SON present.    GOALS:   Multidisciplinary Problems     Physical Therapy Goals        Problem: Physical Therapy Goal    Goal Priority Disciplines Outcome Goal Variances Interventions   Physical Therapy Goal     PT, PT/OT      Description:  PT WILL BE SEEN FOR P.T. FOR A MIN OF 5 OUT OF 7 DAYS A WEEK  LT19  1. PT WILL COMPLETED BED MOBILITY WITH SBA  2. PT WILL T/F TO CHAIR WITH RW AND SBA  3. PT WILL GT TRAIN X 150' WITH RW AND SBA.   4. PT WILL COMPLETE B LE TE X 20 REPS                      History:     Past Medical History:   Diagnosis Date    CHF (congestive heart failure)     CVA (cerebral vascular accident)     Depression     GERD (gastroesophageal reflux disease)     Hyperlipemia     Hypertension     Hypothyroidism        Past Surgical History:   Procedure Laterality Date    CHOLECYSTECTOMY      CORONARY ARTERY BYPASS GRAFT      KNEE SURGERY         Time Tracking:     PT Received On: 10/28/19  PT Start Time: 829     PT Stop Time: 08  PT Total Time (min): 24 min     Billable Minutes: Evaluation 14 and Gait Training 10      Kristina Redd, PT  10/28/2019

## 2019-10-28 NOTE — HPI
Danielle Cruz is a 88 year old female who presented to [facility] due to chest pain, shortness of breath and weakness. She reported that symptoms lasted about 2 hours then resolved spontaneously. Her current medical conditions include CAD s/p CABG, CHF, HTN, HLP, GERD, Thyroid Disease, GERD, Depression. ED workup revealed Troponin 0.028, , CR 1.9. She was placed in observation under the care of [facility] medicine on 10/25/19. Chart reviewed, patient seen and examined. Of note, patient lives in [place] and is visiting her son. Patient denies chest pain or anginal equivalents on exam. She does endorse chronic NEGRON issues. She participated with PT/OT this AM and ambulated down the hallway twice with noted dyspnea. NO leg swelling today. Patient reports that she is at her baseline today. Continues to have issues with nausea post gallbladder removal in [place]. Patient does report that she has moved to live in [place] and would like to establish care with [facility]. Will need OP MPI stress test, clinic will arrange. Further recs to follow.

## 2019-10-28 NOTE — SUBJECTIVE & OBJECTIVE
Past Medical History:   Diagnosis Date    CHF (congestive heart failure)     CVA (cerebral vascular accident)     Depression     GERD (gastroesophageal reflux disease)     Hyperlipemia     Hypertension     Hypothyroidism        Past Surgical History:   Procedure Laterality Date    CHOLECYSTECTOMY      CORONARY ARTERY BYPASS GRAFT      KNEE SURGERY         Review of patient's allergies indicates:   Allergen Reactions    Lisinopril Blisters       No current facility-administered medications on file prior to encounter.      Current Outpatient Medications on File Prior to Encounter   Medication Sig    amlodipine-valsartan (EXFORGE) 5-160 mg per tablet Take 1 tablet by mouth once daily.    aspirin (ECOTRIN) 81 MG EC tablet Take 81 mg by mouth once daily.    atorvastatin (LIPITOR) 40 MG tablet Take 40 mg by mouth nightly.    busPIRone (BUSPAR) 15 MG tablet Take 15 mg by mouth 3 (three) times daily.    furosemide (LASIX) 20 MG tablet Take 20 mg by mouth daily as needed.    hydrALAZINE (APRESOLINE) 25 MG tablet Take 25 mg by mouth once daily.    levothyroxine (SYNTHROID) 25 MCG tablet Take 25 mcg by mouth once daily.    metoprolol succinate (TOPROL-XL) 100 MG 24 hr tablet Take 100 mg by mouth once daily.    omeprazole (PRILOSEC OTC) 20 MG tablet Take 20 mg by mouth once daily.    HYDROcodone-acetaminophen (NORCO) 5-325 mg per tablet Take 0.5 tablets by mouth as needed.    traMADol (ULTRAM) 50 mg tablet Take 50 mg by mouth every 6 (six) hours as needed for Pain.     Family History     None        Tobacco Use    Smoking status: Former Smoker   Substance and Sexual Activity    Alcohol use: Never     Frequency: Never    Drug use: Not Currently    Sexual activity: Not on file     Review of Systems   Constitution: Positive for malaise/fatigue.   HENT: Negative for hearing loss and hoarse voice.    Eyes: Negative for blurred vision and visual disturbance.   Cardiovascular: Positive for chest pain  (resolved) and dyspnea on exertion. Negative for claudication, irregular heartbeat, leg swelling, near-syncope, orthopnea, palpitations, paroxysmal nocturnal dyspnea and syncope.   Respiratory: Positive for shortness of breath. Negative for cough, hemoptysis, sleep disturbances due to breathing, snoring and wheezing.    Endocrine: Negative for cold intolerance and heat intolerance.   Hematologic/Lymphatic: Does not bruise/bleed easily.   Skin: Negative for color change, dry skin and nail changes.   Musculoskeletal: Positive for arthritis and back pain. Negative for joint pain and myalgias.   Gastrointestinal: Negative for bloating, abdominal pain, constipation, nausea and vomiting.   Genitourinary: Negative for dysuria, flank pain, hematuria and hesitancy.   Neurological: Negative for headaches, light-headedness, loss of balance, numbness, paresthesias and weakness.   Psychiatric/Behavioral: Negative for altered mental status.   Allergic/Immunologic: Negative for environmental allergies.     Objective:     Vital Signs (Most Recent):  Temp: 97.5 °F (36.4 °C) (10/28/19 0802)  Pulse: 61 (10/28/19 0802)  Resp: 16 (10/28/19 0802)  BP: (!) 141/67 (10/28/19 0802)  SpO2: 96 % (10/28/19 0802) Vital Signs (24h Range):  Temp:  [96.5 °F (35.8 °C)-97.7 °F (36.5 °C)] 97.5 °F (36.4 °C)  Pulse:  [57-70] 61  Resp:  [16-20] 16  SpO2:  [94 %-98 %] 96 %  BP: (125-141)/(58-67) 141/67     Weight: 65.6 kg (144 lb 10 oz)  Body mass index is 26.45 kg/m².    SpO2: 96 %  O2 Device (Oxygen Therapy): room air      Intake/Output Summary (Last 24 hours) at 10/28/2019 1017  Last data filed at 10/28/2019 0500  Gross per 24 hour   Intake 825 ml   Output 1000 ml   Net -175 ml       Lines/Drains/Airways     None                 Physical Exam   Constitutional: She is oriented to person, place, and time. She appears well-developed and well-nourished. No distress.   HENT:   Head: Normocephalic and atraumatic.   Eyes: Pupils are equal, round, and reactive  to light.   Neck: Normal range of motion and full passive range of motion without pain. Neck supple. No JVD present.   Cardiovascular: Normal rate, regular rhythm, S1 normal, S2 normal and intact distal pulses. PMI is not displaced. Exam reveals no distant heart sounds.   No murmur heard.  Pulses:       Radial pulses are 2+ on the right side, and 2+ on the left side.        Dorsalis pedis pulses are 2+ on the right side, and 2+ on the left side.   Chest pain free on exam   Pulmonary/Chest: Effort normal and breath sounds normal. No accessory muscle usage. No respiratory distress. She has no decreased breath sounds. She has no wheezes. She has no rales.   Abdominal: Soft. Bowel sounds are normal. She exhibits no distension. There is no tenderness.   Musculoskeletal: Normal range of motion. She exhibits no edema.        Right ankle: She exhibits no swelling.        Left ankle: She exhibits no swelling.   Neurological: She is alert and oriented to person, place, and time.   Skin: Skin is warm and dry. She is not diaphoretic. No cyanosis. Nails show no clubbing.   Psychiatric: She has a normal mood and affect. Her speech is normal and behavior is normal. Judgment and thought content normal. Cognition and memory are normal.   Nursing note and vitals reviewed.      Significant Labs:   BMP:   Recent Labs   Lab 10/27/19  0509 10/28/19  0430    105    138   K 5.3* 4.1    103   CO2 27 24   BUN 15 14   CREATININE 2.1* 2.0*   CALCIUM 9.8 9.4   MG 2.1 2.1   , CBC   Recent Labs   Lab 10/27/19  0509 10/28/19  0430   WBC 7.34 7.28   HGB 12.4 12.5   HCT 37.9 38.8   * 141*   , Troponin No results for input(s): TROPONINI in the last 48 hours. and All pertinent lab results from the last 24 hours have been reviewed.    Significant Imaging: Echocardiogram:   2D echo with color flow doppler:   Results for orders placed or performed during the hospital encounter of 10/25/19   2D echo with color flow doppler    Result Value Ref Range    QEF 55 55 - 65    Diastolic Dysfunction Yes (A)     Aortic Valve Stenosis MILD (A)     Est. PA Systolic Pressure 37.45     Mitral Valve Mobility MILDLY RESTRICTED     Tricuspid Valve Regurgitation MILD     Narrative    Date of Procedure: 10/27/2019        TEST DESCRIPTION   Technical Quality: This is a technically challenging study. There is poor endocardial definition.     Aorta: The aortic root is normal in size, measuring 2.0 cm at sinotubular junction and 2.4 cm at Sinuses of Valsalva. The proximal ascending aorta is normal in size, measuring 2.8 cm across.     Left Atrium: The left atrium is normal in size, measuring 5.2 cm across in the parasternal view, and 6.4 cm across in the apical view.     Left Ventricle: The left ventricle is normal in size, with an end-diastolic diameter of 4.8 cm, and an end-systolic diameter of 3.0 cm. LV wall thickness is normal, with the septum measuring 0.9 cm and the posterior wall measuring 1.0 cm across. There   are no regional wall motion abnormalities. Left ventricular systolic function appears normal. Visually estimated ejection fraction is 55-60%. The LV Doppler derived stroke volume equals 63.0 ccs.     Diastolic indices: E wave velocity 0.8 m/s, E/A ratio 0.9,  msec., E/e' ratio(avg) 17. There is pseudonormalization of mitral inflow pattern consistent with significant diastolic dysfunction.     Right Atrium: The right atrium is normal in size, measuring 5.8 cm in length and 3.1 cm in width in the apical view.     Right Ventricle: The right ventricle is normal in size measuring 2.9 cm at the base in the apical right ventricle-focused view. Global right ventricular systolic function appears normal. The estimated PA systolic pressure is greater than 37 mmHg.     Aortic Valve:  The aortic valve is mildly sclerotic with normal leaflet mobility. The peak velocity obtained across the aortic valve is 1.84 m/s, which translates to a peak gradient  of 14 mmHg. The mean gradient is 5 mmHg. Using a left ventricular   outflow tract diameter of 2.0 cm, a left ventricular outflow tract velocity time integral of 20 cm, and a peak instantaneous transvalvular velocity time integral of 37 cm, the calculated aortic valve area is 1.73 cm2, consistent with mild aortic   stenosis. There is aortic annular calcification.     Mitral Valve:  The mitral valve is mildly sclerotic with mildly restricted leaflet mobility. There is mitral annular calcification.     Tricuspid Valve:  The tricuspid valve is normal in structure with normal leaflet mobility. There is mild tricuspid regurgitation.     Pulmonary Valve:  The pulmonic valve is not well seen.     IVC: The IVC is not visualized.     Intracavitary: There is no evidence of pericardial effusion, intracavity mass, thrombi, or vegetation.         CONCLUSIONS     1 - Normal left ventricular systolic function (EF 55-60%).     2 - Impaired LV relaxation, elevated LAP (grade 2 diastolic dysfunction).     3 - Normal right ventricular systolic function .     4 - No wall motion abnormalities.     5 - The estimated PA systolic pressure is greater than 37 mmHg.     6 - Mild aortic stenosis, ARMANDO = 1.73 cm2, peak velocity = 1.84 m/s, mean gradient = 5 mmHg.     7 - The mitral valve is mildly sclerotic with mildly restricted leaflet mobility.     8 - Mild tricuspid regurgitation.             This document has been electronically    SIGNED BY: Anton Velazquez MD On: 10/27/2019 11:36    and X-Ray: CXR: X-Ray Chest 1 View (CXR): No results found for this visit on 10/25/19.

## 2019-10-28 NOTE — CONSULTS
[facility] -   Cardiology  Consult Note    Patient Name: Danielle Cruz  MRN: [id number]  Admission Date: 10/25/2019  Hospital Length of Stay: 0 days  Code Status: Full Code   Attending Provider: Campos Laughlin MD   Consulting Provider: LUTHER Merida  Primary Care Physician: Jan Qureshi MD  Principal Problem:Chest pain    Patient information was obtained from patient, relative(s), past medical records and ER records.     Inpatient consult to Cardiology  Consult performed by: LUTHER Baca  Consult ordered by: Esthela Appiah NP        Subjective:     Chief Complaint:  Chest tightness     HPI:   Danielle Cruz is a 88 year old female who presented to [facility] due to chest pain, shortness of breath and weakness. She reported that symptoms lasted about 2 hours then resolved spontaneously. Her current medical conditions include CAD s/p CABG, CHF, HTN, HLP, GERD, Thyroid Disease, GERD, Depression. ED workup revealed Troponin 0.028, , CR 1.9. She was placed in observation under the care of hospital medicine on 10/25/19. Chart reviewed, patient seen and examined. Of note, patient lives in [place] and is visiting her son. Patient denies chest pain or anginal equivalents on exam. She does endorse chronic NEGRON issues. She participated with PT/OT this AM and ambulated down the hallway twice with noted dyspnea. NO leg swelling today. Patient reports that she is at her baseline today. Continues to have issues with nausea post gallbladder removal in [place]. Patient does report that she has moved to live in [place] and would like to establish care with [facility]. Will need OP MPI stress test, clinic will arrange. Further recs to follow.     Past Medical History:   Diagnosis Date    CHF (congestive heart failure)     CVA (cerebral vascular accident)     Depression     GERD (gastroesophageal reflux disease)     Hyperlipemia     Hypertension     Hypothyroidism        Past  Surgical History:   Procedure Laterality Date    CHOLECYSTECTOMY      CORONARY ARTERY BYPASS GRAFT      KNEE SURGERY         Review of patient's allergies indicates:   Allergen Reactions    Lisinopril Blisters       No current facility-administered medications on file prior to encounter.      Current Outpatient Medications on File Prior to Encounter   Medication Sig    amlodipine-valsartan (EXFORGE) 5-160 mg per tablet Take 1 tablet by mouth once daily.    aspirin (ECOTRIN) 81 MG EC tablet Take 81 mg by mouth once daily.    atorvastatin (LIPITOR) 40 MG tablet Take 40 mg by mouth nightly.    busPIRone (BUSPAR) 15 MG tablet Take 15 mg by mouth 3 (three) times daily.    furosemide (LASIX) 20 MG tablet Take 20 mg by mouth daily as needed.    hydrALAZINE (APRESOLINE) 25 MG tablet Take 25 mg by mouth once daily.    levothyroxine (SYNTHROID) 25 MCG tablet Take 25 mcg by mouth once daily.    metoprolol succinate (TOPROL-XL) 100 MG 24 hr tablet Take 100 mg by mouth once daily.    omeprazole (PRILOSEC OTC) 20 MG tablet Take 20 mg by mouth once daily.    HYDROcodone-acetaminophen (NORCO) 5-325 mg per tablet Take 0.5 tablets by mouth as needed.    traMADol (ULTRAM) 50 mg tablet Take 50 mg by mouth every 6 (six) hours as needed for Pain.     Family History     None        Tobacco Use    Smoking status: Former Smoker   Substance and Sexual Activity    Alcohol use: Never     Frequency: Never    Drug use: Not Currently    Sexual activity: Not on file     Review of Systems   Constitution: Positive for malaise/fatigue.   HENT: Negative for hearing loss and hoarse voice.    Eyes: Negative for blurred vision and visual disturbance.   Cardiovascular: Positive for chest pain (resolved) and dyspnea on exertion. Negative for claudication, irregular heartbeat, leg swelling, near-syncope, orthopnea, palpitations, paroxysmal nocturnal dyspnea and syncope.   Respiratory: Positive for shortness of breath. Negative for cough,  hemoptysis, sleep disturbances due to breathing, snoring and wheezing.    Endocrine: Negative for cold intolerance and heat intolerance.   Hematologic/Lymphatic: Does not bruise/bleed easily.   Skin: Negative for color change, dry skin and nail changes.   Musculoskeletal: Positive for arthritis and back pain. Negative for joint pain and myalgias.   Gastrointestinal: Negative for bloating, abdominal pain, constipation, nausea and vomiting.   Genitourinary: Negative for dysuria, flank pain, hematuria and hesitancy.   Neurological: Negative for headaches, light-headedness, loss of balance, numbness, paresthesias and weakness.   Psychiatric/Behavioral: Negative for altered mental status.   Allergic/Immunologic: Negative for environmental allergies.     Objective:     Vital Signs (Most Recent):  Temp: 97.5 °F (36.4 °C) (10/28/19 0802)  Pulse: 61 (10/28/19 0802)  Resp: 16 (10/28/19 0802)  BP: (!) 141/67 (10/28/19 0802)  SpO2: 96 % (10/28/19 0802) Vital Signs (24h Range):  Temp:  [96.5 °F (35.8 °C)-97.7 °F (36.5 °C)] 97.5 °F (36.4 °C)  Pulse:  [57-70] 61  Resp:  [16-20] 16  SpO2:  [94 %-98 %] 96 %  BP: (125-141)/(58-67) 141/67     Weight: 65.6 kg (144 lb 10 oz)  Body mass index is 26.45 kg/m².    SpO2: 96 %  O2 Device (Oxygen Therapy): room air      Intake/Output Summary (Last 24 hours) at 10/28/2019 1017  Last data filed at 10/28/2019 0500  Gross per 24 hour   Intake 825 ml   Output 1000 ml   Net -175 ml       Lines/Drains/Airways     None                 Physical Exam   Constitutional: She is oriented to person, place, and time. She appears well-developed and well-nourished. No distress.   HENT:   Head: Normocephalic and atraumatic.   Eyes: Pupils are equal, round, and reactive to light.   Neck: Normal range of motion and full passive range of motion without pain. Neck supple. No JVD present.   Cardiovascular: Normal rate, regular rhythm, S1 normal, S2 normal and intact distal pulses. PMI is not displaced. Exam reveals  no distant heart sounds.   No murmur heard.  Pulses:       Radial pulses are 2+ on the right side, and 2+ on the left side.        Dorsalis pedis pulses are 2+ on the right side, and 2+ on the left side.   Chest pain free on exam   Pulmonary/Chest: Effort normal and breath sounds normal. No accessory muscle usage. No respiratory distress. She has no decreased breath sounds. She has no wheezes. She has no rales.   Abdominal: Soft. Bowel sounds are normal. She exhibits no distension. There is no tenderness.   Musculoskeletal: Normal range of motion. She exhibits no edema.        Right ankle: She exhibits no swelling.        Left ankle: She exhibits no swelling.   Neurological: She is alert and oriented to person, place, and time.   Skin: Skin is warm and dry. She is not diaphoretic. No cyanosis. Nails show no clubbing.   Psychiatric: She has a normal mood and affect. Her speech is normal and behavior is normal. Judgment and thought content normal. Cognition and memory are normal.   Nursing note and vitals reviewed.      Significant Labs:   BMP:   Recent Labs   Lab 10/27/19  0509 10/28/19  0430    105    138   K 5.3* 4.1    103   CO2 27 24   BUN 15 14   CREATININE 2.1* 2.0*   CALCIUM 9.8 9.4   MG 2.1 2.1   , CBC   Recent Labs   Lab 10/27/19  0509 10/28/19  0430   WBC 7.34 7.28   HGB 12.4 12.5   HCT 37.9 38.8   * 141*   , Troponin No results for input(s): TROPONINI in the last 48 hours. and All pertinent lab results from the last 24 hours have been reviewed.    Significant Imaging: Echocardiogram:   2D echo with color flow doppler:   Results for orders placed or performed during the hospital encounter of 10/25/19   2D echo with color flow doppler   Result Value Ref Range    QEF 55 55 - 65    Diastolic Dysfunction Yes (A)     Aortic Valve Stenosis MILD (A)     Est. PA Systolic Pressure 37.45     Mitral Valve Mobility MILDLY RESTRICTED     Tricuspid Valve Regurgitation MILD     Narrative    Date  of Procedure: 10/27/2019        TEST DESCRIPTION   Technical Quality: This is a technically challenging study. There is poor endocardial definition.     Aorta: The aortic root is normal in size, measuring 2.0 cm at sinotubular junction and 2.4 cm at Sinuses of Valsalva. The proximal ascending aorta is normal in size, measuring 2.8 cm across.     Left Atrium: The left atrium is normal in size, measuring 5.2 cm across in the parasternal view, and 6.4 cm across in the apical view.     Left Ventricle: The left ventricle is normal in size, with an end-diastolic diameter of 4.8 cm, and an end-systolic diameter of 3.0 cm. LV wall thickness is normal, with the septum measuring 0.9 cm and the posterior wall measuring 1.0 cm across. There   are no regional wall motion abnormalities. Left ventricular systolic function appears normal. Visually estimated ejection fraction is 55-60%. The LV Doppler derived stroke volume equals 63.0 ccs.     Diastolic indices: E wave velocity 0.8 m/s, E/A ratio 0.9,  msec., E/e' ratio(avg) 17. There is pseudonormalization of mitral inflow pattern consistent with significant diastolic dysfunction.     Right Atrium: The right atrium is normal in size, measuring 5.8 cm in length and 3.1 cm in width in the apical view.     Right Ventricle: The right ventricle is normal in size measuring 2.9 cm at the base in the apical right ventricle-focused view. Global right ventricular systolic function appears normal. The estimated PA systolic pressure is greater than 37 mmHg.     Aortic Valve:  The aortic valve is mildly sclerotic with normal leaflet mobility. The peak velocity obtained across the aortic valve is 1.84 m/s, which translates to a peak gradient of 14 mmHg. The mean gradient is 5 mmHg. Using a left ventricular   outflow tract diameter of 2.0 cm, a left ventricular outflow tract velocity time integral of 20 cm, and a peak instantaneous transvalvular velocity time integral of 37 cm, the  calculated aortic valve area is 1.73 cm2, consistent with mild aortic   stenosis. There is aortic annular calcification.     Mitral Valve:  The mitral valve is mildly sclerotic with mildly restricted leaflet mobility. There is mitral annular calcification.     Tricuspid Valve:  The tricuspid valve is normal in structure with normal leaflet mobility. There is mild tricuspid regurgitation.     Pulmonary Valve:  The pulmonic valve is not well seen.     IVC: The IVC is not visualized.     Intracavitary: There is no evidence of pericardial effusion, intracavity mass, thrombi, or vegetation.         CONCLUSIONS     1 - Normal left ventricular systolic function (EF 55-60%).     2 - Impaired LV relaxation, elevated LAP (grade 2 diastolic dysfunction).     3 - Normal right ventricular systolic function .     4 - No wall motion abnormalities.     5 - The estimated PA systolic pressure is greater than 37 mmHg.     6 - Mild aortic stenosis, ARMANDO = 1.73 cm2, peak velocity = 1.84 m/s, mean gradient = 5 mmHg.     7 - The mitral valve is mildly sclerotic with mildly restricted leaflet mobility.     8 - Mild tricuspid regurgitation.             This document has been electronically    SIGNED BY: Anton Velazquez MD On: 10/27/2019 11:36    and X-Ray: CXR: X-Ray Chest 1 View (CXR): No results found for this visit on 10/25/19.    Assessment and Plan:     * Chest pain with elevated troponin  -troponin elevated but flat  -Continue current medical management  -BB on hold given bradycardia, improving slowly  -Needs OP Stress test  -Ok to discharge home from cardiology standpoint, clinic will call to arrange follow up    Acute on chronic diastolic congestive heart failure  Echo revealed EF 55-60%, DD, -WMA's  -NO BB given due to bradycardia  -Continue Lasix 20 mg daily, Norvasc, Hydralazine  -Try to resume BB as OP if HR allows  -Dash diet, 2 gm sodium restriction  -1500 ml fluid restriction  -Daily weights  -Strict intake and output  -Close  follow up with primary cardiology service after discharge  -Needs to establish with Nephrology given CKD and ongoing issues with CHF       Bradycardia  -continue telemetry monitoring  -hold BB for now    Hyperlipemia  -continue statin    Hypertension  -On medical therapy  -Hold BB given bradycardia  -allergic to ACEi  -continue Norvasc, hydralazine    Hypothyroidism  -continue synthroid        VTE Risk Mitigation (From admission, onward)         Ordered     Place NAPOLEON hose  Until discontinued      10/27/19 1224     heparin (porcine) injection 5,000 Units  Every 8 hours      10/25/19 2600                Thank you for your consult. I will follow-up with patient. Please contact us if you have any additional questions.    Bambi Martell, ADITIC  Cardiology   [facility]

## 2019-10-28 NOTE — ASSESSMENT & PLAN NOTE
-troponin elevated but flat  -Continue current medical management  -BB on hold given bradycardia, improving slowly  -Needs OP Stress test  -Ok to discharge home from cardiology standpoint, clinic will call to arrange follow up

## 2019-10-28 NOTE — TELEPHONE ENCOUNTER
----- Message from Ugo Woodall sent at 10/28/2019  3:53 PM CDT -----  Contact: Mahesh (Pt Daughter)  .Type:  Patient Returning Call    Who Called: Pt   Who Left Message for Patient: Joseph  Does the patient know what this is regarding?: return call   Would the patient rather a call back or a response via [facility]? Call back   Best Call Back Number: .[phone] (home)   Additional Information:

## 2019-10-28 NOTE — TELEPHONE ENCOUNTER
----- Message from LUTHER Baca sent at 10/28/2019 10:21 AM CDT -----  Patient needs hospital follow up with Dr. Velazquez later this week    Please call and arrange    Thanks

## 2019-10-28 NOTE — PLAN OF CARE
Patient has been accepted with [facility] spoke with Pari. CM also notified patient and family at the bedside.        10/28/19 0411   Post-Acute Status   Post-Acute Authorization Home Health/Hospice   Home Health/Hospice Status Set-up Complete

## 2019-10-28 NOTE — ASSESSMENT & PLAN NOTE
-On medical therapy  -Hold BB given bradycardia  -allergic to ACEi  -continue Norvasc, hydralazine

## 2019-10-28 NOTE — ASSESSMENT & PLAN NOTE
Echo revealed EF 55-60%, DD, -WMA's  -NO BB given due to bradycardia  -Continue Lasix 20 mg daily, Norvasc, Hydralazine  -Try to resume BB as OP if HR allows  -Dash diet, 2 gm sodium restriction  -1500 ml fluid restriction  -Daily weights  -Strict intake and output  -Close follow up with primary cardiology service after discharge  -Needs to establish with Nephrology given CKD and ongoing issues with CHF

## 2019-10-28 NOTE — PLAN OF CARE
CM spoke to patient who is awake, alert, and able to make needs known. Patient has daughter by the bedside who states that the patient lives with her and is able to ambulate with the use of her rolling walker. Patient's daughter states that she does think that Home Health services would be beneficial for the patient for a short period of time. Family wants patient to be set [facility] with [facility] currently waiting on Approval.           CM provided a transitional care folder, information on advanced directives, information on pharmacy bedside delivery, and discharge planning begins on admission with contact information for any needs/questions.    D/C Plan: Home   PCP:  Dr. Qureshi   Preferred Pharmacy[facility]   Discharge transportation: family   [facility]: pending   Pharmacy Bedside Delivery: declined        10/28/19 1033   Discharge Assessment   Assessment Type Discharge Planning Assessment   Confirmed/corrected address and phone number on facesheet? Yes   Assessment information obtained from? Patient;Caregiver   Expected Length of Stay (days)   (tbd )   Communicated expected length of stay with patient/caregiver yes   Prior to hospitilization cognitive status: Alert/Oriented   Prior to hospitalization functional status: Assistive Equipment   Current cognitive status: Alert/Oriented   Current Functional Status: Assistive Equipment   Lives With child(aaliyah), adult   Able to Return to Prior Arrangements yes   Is patient able to care for self after discharge? Yes   Who are your caregiver(s) and their phone number(s)? Mahesh (daughter) [phone]   Patient's perception of discharge disposition home or selfcare;home health   Patient currently being followed by outpatient case management? No   Patient currently receives any other outside agency services? No   Equipment Currently Used at Home walker, rolling;grab bar;shower chair   Do you have any problems affording any of your prescribed medications? No   Is the  patient taking medications as prescribed? yes   Does the patient have transportation home? Yes   Transportation Anticipated family or friend will provide   Does the patient receive services at the [facility]? No   Discharge Plan A Home with family;Home Health   DME Needed Upon Discharge  none   Patient/Family in Agreement with Plan yes

## 2019-10-29 ENCOUNTER — HOSPITAL ENCOUNTER (OUTPATIENT)
Facility: HOSPITAL | Age: 84
Discharge: HOME | End: 2019-10-30
Attending: EMERGENCY MEDICINE | Admitting: INTERNAL MEDICINE
Payer: MEDICARE

## 2019-10-29 DIAGNOSIS — K21.9 GASTROESOPHAGEAL REFLUX DISEASE, ESOPHAGITIS PRESENCE NOT SPECIFIED: Primary | ICD-10-CM

## 2019-10-29 DIAGNOSIS — R79.89 ELEVATED TROPONIN: ICD-10-CM

## 2019-10-29 DIAGNOSIS — R79.89 TROPONIN I ABOVE REFERENCE RANGE: ICD-10-CM

## 2019-10-29 DIAGNOSIS — R07.9 CHEST PAIN: ICD-10-CM

## 2019-10-29 PROBLEM — N18.30 STAGE 3 CHRONIC KIDNEY DISEASE: Status: ACTIVE | Noted: 2019-10-29

## 2019-10-29 LAB
ALBUMIN SERPL BCP-MCNC: 3.5 G/DL (ref 3.5–5.2)
ALP SERPL-CCNC: 92 U/L (ref 55–135)
ALT SERPL W/O P-5'-P-CCNC: 14 U/L (ref 10–44)
ANION GAP SERPL CALC-SCNC: 12 MMOL/L (ref 8–16)
AST SERPL-CCNC: 23 U/L (ref 10–40)
BASOPHILS # BLD AUTO: 0.02 K/UL (ref 0–0.2)
BASOPHILS NFR BLD: 0.2 % (ref 0–1.9)
BILIRUB SERPL-MCNC: 0.7 MG/DL (ref 0.1–1)
BNP SERPL-MCNC: 195 PG/ML (ref 0–99)
BUN SERPL-MCNC: 14 MG/DL (ref 8–23)
CALCIUM SERPL-MCNC: 9.7 MG/DL (ref 8.7–10.5)
CHLORIDE SERPL-SCNC: 101 MMOL/L (ref 95–110)
CO2 SERPL-SCNC: 24 MMOL/L (ref 23–29)
CREAT SERPL-MCNC: 2 MG/DL (ref 0.5–1.4)
DIASTOLIC DYSFUNCTION: NO
DIFFERENTIAL METHOD: ABNORMAL
EOSINOPHIL # BLD AUTO: 0.1 K/UL (ref 0–0.5)
EOSINOPHIL NFR BLD: 1.6 % (ref 0–8)
ERYTHROCYTE [DISTWIDTH] IN BLOOD BY AUTOMATED COUNT: 12.9 % (ref 11.5–14.5)
EST. GFR  (AFRICAN AMERICAN): 25 ML/MIN/1.73 M^2
EST. GFR  (NON AFRICAN AMERICAN): 22 ML/MIN/1.73 M^2
GLUCOSE SERPL-MCNC: 113 MG/DL (ref 70–110)
HCT VFR BLD AUTO: 37.6 % (ref 37–48.5)
HGB BLD-MCNC: 12.6 G/DL (ref 12–16)
IMM GRANULOCYTES # BLD AUTO: 0.04 K/UL (ref 0–0.04)
IMM GRANULOCYTES NFR BLD AUTO: 0.5 % (ref 0–0.5)
LYMPHOCYTES # BLD AUTO: 2.8 K/UL (ref 1–4.8)
LYMPHOCYTES NFR BLD: 32.4 % (ref 18–48)
MCH RBC QN AUTO: 32 PG (ref 27–31)
MCHC RBC AUTO-ENTMCNC: 33.5 G/DL (ref 32–36)
MCV RBC AUTO: 95 FL (ref 82–98)
MONOCYTES # BLD AUTO: 1 K/UL (ref 0.3–1)
MONOCYTES NFR BLD: 11.4 % (ref 4–15)
NEUTROPHILS # BLD AUTO: 4.6 K/UL (ref 1.8–7.7)
NEUTROPHILS NFR BLD: 53.9 % (ref 38–73)
NRBC BLD-RTO: 0 /100 WBC
PLATELET # BLD AUTO: 152 K/UL (ref 150–350)
PMV BLD AUTO: 11.8 FL (ref 9.2–12.9)
POTASSIUM SERPL-SCNC: 4 MMOL/L (ref 3.5–5.1)
PROT SERPL-MCNC: 6.5 G/DL (ref 6–8.4)
RBC # BLD AUTO: 3.94 M/UL (ref 4–5.4)
SODIUM SERPL-SCNC: 137 MMOL/L (ref 136–145)
TROPONIN I SERPL DL<=0.01 NG/ML-MCNC: 0.04 NG/ML (ref 0–0.03)
TROPONIN I SERPL DL<=0.01 NG/ML-MCNC: 0.04 NG/ML (ref 0–0.03)
TROPONIN I SERPL DL<=0.01 NG/ML-MCNC: 0.06 NG/ML (ref 0–0.03)
WBC # BLD AUTO: 8.57 K/UL (ref 3.9–12.7)

## 2019-10-29 PROCEDURE — 83880 ASSAY OF NATRIURETIC PEPTIDE: CPT

## 2019-10-29 PROCEDURE — 96376 TX/PRO/DX INJ SAME DRUG ADON: CPT

## 2019-10-29 PROCEDURE — 80053 COMPREHEN METABOLIC PANEL: CPT

## 2019-10-29 PROCEDURE — 63600175 PHARM REV CODE 636 W HCPCS: Performed by: NURSE PRACTITIONER

## 2019-10-29 PROCEDURE — 99285 EMERGENCY DEPT VISIT HI MDM: CPT | Mod: 25

## 2019-10-29 PROCEDURE — G0378 HOSPITAL OBSERVATION PER HR: HCPCS

## 2019-10-29 PROCEDURE — 99284 PR EMERGENCY DEPT VISIT,LEVEL IV: ICD-10-PCS | Mod: 25,,, | Performed by: INTERNAL MEDICINE

## 2019-10-29 PROCEDURE — 93017 CV STRESS TEST TRACING ONLY: CPT

## 2019-10-29 PROCEDURE — 99284 EMERGENCY DEPT VISIT MOD MDM: CPT | Mod: 25,,, | Performed by: INTERNAL MEDICINE

## 2019-10-29 PROCEDURE — 78452 NM MULTI PHARM STRESS CARDIAC COMPONENT: ICD-10-PCS | Mod: 26,,, | Performed by: INTERNAL MEDICINE

## 2019-10-29 PROCEDURE — 93010 ELECTROCARDIOGRAM REPORT: CPT | Mod: 59,,, | Performed by: INTERNAL MEDICINE

## 2019-10-29 PROCEDURE — 25000003 PHARM REV CODE 250: Performed by: NURSE PRACTITIONER

## 2019-10-29 PROCEDURE — 96375 TX/PRO/DX INJ NEW DRUG ADDON: CPT

## 2019-10-29 PROCEDURE — 25000003 PHARM REV CODE 250: Performed by: EMERGENCY MEDICINE

## 2019-10-29 PROCEDURE — 93010 EKG 12-LEAD: ICD-10-PCS | Mod: 59,,, | Performed by: INTERNAL MEDICINE

## 2019-10-29 PROCEDURE — 93018 NM MULTI PHARM STRESS CARDIAC COMPONENT: ICD-10-PCS | Mod: ,,, | Performed by: INTERNAL MEDICINE

## 2019-10-29 PROCEDURE — 93016 NM MULTI PHARM STRESS CARDIAC COMPONENT: ICD-10-PCS | Mod: ,,, | Performed by: INTERNAL MEDICINE

## 2019-10-29 PROCEDURE — 93005 ELECTROCARDIOGRAM TRACING: CPT

## 2019-10-29 PROCEDURE — 85025 COMPLETE CBC W/AUTO DIFF WBC: CPT

## 2019-10-29 PROCEDURE — 84484 ASSAY OF TROPONIN QUANT: CPT | Mod: 91

## 2019-10-29 PROCEDURE — 93016 CV STRESS TEST SUPVJ ONLY: CPT | Mod: ,,, | Performed by: INTERNAL MEDICINE

## 2019-10-29 PROCEDURE — 78452 HT MUSCLE IMAGE SPECT MULT: CPT | Mod: 26,,, | Performed by: INTERNAL MEDICINE

## 2019-10-29 PROCEDURE — 93018 CV STRESS TEST I&R ONLY: CPT | Mod: ,,, | Performed by: INTERNAL MEDICINE

## 2019-10-29 PROCEDURE — S0028 INJECTION, FAMOTIDINE, 20 MG: HCPCS | Performed by: EMERGENCY MEDICINE

## 2019-10-29 PROCEDURE — 96365 THER/PROPH/DIAG IV INF INIT: CPT

## 2019-10-29 PROCEDURE — 36415 COLL VENOUS BLD VENIPUNCTURE: CPT

## 2019-10-29 PROCEDURE — 63600175 PHARM REV CODE 636 W HCPCS: Performed by: EMERGENCY MEDICINE

## 2019-10-29 RX ORDER — FAMOTIDINE 20 MG/50ML
20 INJECTION, SOLUTION INTRAVENOUS
Status: COMPLETED | OUTPATIENT
Start: 2019-10-29 | End: 2019-10-29

## 2019-10-29 RX ORDER — MORPHINE SULFATE 4 MG/ML
4 INJECTION, SOLUTION INTRAMUSCULAR; INTRAVENOUS
Status: COMPLETED | OUTPATIENT
Start: 2019-10-29 | End: 2019-10-29

## 2019-10-29 RX ORDER — HYDRALAZINE HYDROCHLORIDE 25 MG/1
25 TABLET, FILM COATED ORAL DAILY
Status: DISCONTINUED | OUTPATIENT
Start: 2019-10-29 | End: 2019-10-30 | Stop reason: HOSPADM

## 2019-10-29 RX ORDER — FUROSEMIDE 10 MG/ML
40 INJECTION INTRAMUSCULAR; INTRAVENOUS ONCE
Status: DISCONTINUED | OUTPATIENT
Start: 2019-10-29 | End: 2019-10-29

## 2019-10-29 RX ORDER — ACETAMINOPHEN 325 MG/1
650 TABLET ORAL EVERY 6 HOURS PRN
Status: DISCONTINUED | OUTPATIENT
Start: 2019-10-29 | End: 2019-10-30 | Stop reason: HOSPADM

## 2019-10-29 RX ORDER — ASPIRIN 81 MG/1
81 TABLET ORAL DAILY
Status: DISCONTINUED | OUTPATIENT
Start: 2019-10-30 | End: 2019-10-30 | Stop reason: HOSPADM

## 2019-10-29 RX ORDER — MAG HYDROX/ALUMINUM HYD/SIMETH 200-200-20
30 SUSPENSION, ORAL (FINAL DOSE FORM) ORAL
Status: DISCONTINUED | OUTPATIENT
Start: 2019-10-29 | End: 2019-10-30 | Stop reason: HOSPADM

## 2019-10-29 RX ORDER — REGADENOSON 0.08 MG/ML
0.4 INJECTION, SOLUTION INTRAVENOUS ONCE
Status: COMPLETED | OUTPATIENT
Start: 2019-10-29 | End: 2019-10-29

## 2019-10-29 RX ORDER — SODIUM CHLORIDE 0.9 % (FLUSH) 0.9 %
10 SYRINGE (ML) INJECTION
Status: DISCONTINUED | OUTPATIENT
Start: 2019-10-29 | End: 2019-10-30 | Stop reason: HOSPADM

## 2019-10-29 RX ORDER — FUROSEMIDE 10 MG/ML
40 INJECTION INTRAMUSCULAR; INTRAVENOUS ONCE
Status: COMPLETED | OUTPATIENT
Start: 2019-10-29 | End: 2019-10-29

## 2019-10-29 RX ORDER — LEVOTHYROXINE SODIUM 25 UG/1
25 TABLET ORAL
Status: DISCONTINUED | OUTPATIENT
Start: 2019-10-30 | End: 2019-10-30 | Stop reason: HOSPADM

## 2019-10-29 RX ORDER — ATORVASTATIN CALCIUM 40 MG/1
40 TABLET, FILM COATED ORAL NIGHTLY
Status: DISCONTINUED | OUTPATIENT
Start: 2019-10-29 | End: 2019-10-30 | Stop reason: HOSPADM

## 2019-10-29 RX ORDER — PANTOPRAZOLE SODIUM 40 MG/1
40 TABLET, DELAYED RELEASE ORAL DAILY
Status: DISCONTINUED | OUTPATIENT
Start: 2019-10-30 | End: 2019-10-30 | Stop reason: HOSPADM

## 2019-10-29 RX ORDER — ONDANSETRON 4 MG/1
TABLET, ORALLY DISINTEGRATING ORAL
COMMUNITY
Start: 2019-09-10

## 2019-10-29 RX ORDER — AMLODIPINE BESYLATE 5 MG/1
5 TABLET ORAL DAILY
Status: DISCONTINUED | OUTPATIENT
Start: 2019-10-29 | End: 2019-10-30 | Stop reason: HOSPADM

## 2019-10-29 RX ORDER — ONDANSETRON 2 MG/ML
4 INJECTION INTRAMUSCULAR; INTRAVENOUS
Status: COMPLETED | OUTPATIENT
Start: 2019-10-29 | End: 2019-10-29

## 2019-10-29 RX ORDER — ASPIRIN 325 MG
325 TABLET ORAL
Status: ACTIVE | OUTPATIENT
Start: 2019-10-29 | End: 2019-10-29

## 2019-10-29 RX ORDER — ONDANSETRON 4 MG/1
4 TABLET, ORALLY DISINTEGRATING ORAL EVERY 6 HOURS PRN
Status: DISCONTINUED | OUTPATIENT
Start: 2019-10-29 | End: 2019-10-30 | Stop reason: HOSPADM

## 2019-10-29 RX ADMIN — ALUMINUM HYDROXIDE, MAGNESIUM HYDROXIDE, AND SIMETHICONE 30 ML: 200; 200; 20 SUSPENSION ORAL at 03:10

## 2019-10-29 RX ADMIN — DICYCLOMINE HYDROCHLORIDE 50 ML: 10 SOLUTION ORAL at 06:10

## 2019-10-29 RX ADMIN — ALUMINUM HYDROXIDE, MAGNESIUM HYDROXIDE, AND SIMETHICONE 30 ML: 200; 200; 20 SUSPENSION ORAL at 08:10

## 2019-10-29 RX ADMIN — MORPHINE SULFATE 4 MG: 4 INJECTION, SOLUTION INTRAMUSCULAR; INTRAVENOUS at 08:10

## 2019-10-29 RX ADMIN — ONDANSETRON 4 MG: 2 INJECTION INTRAMUSCULAR; INTRAVENOUS at 08:10

## 2019-10-29 RX ADMIN — ACETAMINOPHEN 650 MG: 325 TABLET ORAL at 05:10

## 2019-10-29 RX ADMIN — AMLODIPINE BESYLATE 5 MG: 5 TABLET ORAL at 03:10

## 2019-10-29 RX ADMIN — REGADENOSON 0.4 MG: 0.08 INJECTION, SOLUTION INTRAVENOUS at 10:10

## 2019-10-29 RX ADMIN — HYDRALAZINE HYDROCHLORIDE 25 MG: 25 TABLET, FILM COATED ORAL at 03:10

## 2019-10-29 RX ADMIN — FAMOTIDINE 20 MG: 20 INJECTION, SOLUTION INTRAVENOUS at 06:10

## 2019-10-29 RX ADMIN — ATORVASTATIN CALCIUM 40 MG: 40 TABLET, FILM COATED ORAL at 08:10

## 2019-10-29 RX ADMIN — FUROSEMIDE 40 MG: 10 INJECTION, SOLUTION INTRAMUSCULAR; INTRAVENOUS at 10:10

## 2019-10-29 RX ADMIN — ONDANSETRON 4 MG: 4 TABLET, ORALLY DISINTEGRATING ORAL at 02:10

## 2019-10-29 NOTE — ED PROVIDER NOTES
"SCRIBE #1 NOTE: I, Davy Day, am scribing for, and in the presence of, Giancarlo Colin Jr., MD. I have scribed the entire note.       History     Chief Complaint   Patient presents with    Abdominal Pain     "burning" epigastric pain radiating into chest with nausea and reflux, worsens with palpitation     Review of patient's allergies indicates:   Allergen Reactions    Lisinopril Blisters         History of Present Illness     HPI    10/29/2019, 6:42 AM  History obtained from the patient and family      History of Present Illness: Danielle Cruz is a 88 y.o. female patient with a PMHx of CHF, CVA, GERD, HLD, HTN, hypothyroid, and depression who presents to the Emergency Department for evaluation of mid sternal chest pain which onset this morning. Pt describes the pain as dull and hard and states she woke up with it.  Pain is epigastric radiated chest.  Symptoms are constant and moderate in severity. Pt states the pain is worse lying flat. Associated sxs include nausea and metallic taste in mouth. Patient denies any SOB, diaphoresis, palpitations, extremity weakness/numbness, leg pain/swelling, dizziness, cough, vomiting, and all other sxs at this time. Family makes note pt was discharged yesterday after being admitted here at [facility] [place] for CHF and acid reflux. No further complaints or concerns at this time.         Arrival mode: AASI    PCP: Jan Qureshi MD        Past Medical History:  Past Medical History:   Diagnosis Date    CHF (congestive heart failure)     CVA (cerebral vascular accident)     Depression     GERD (gastroesophageal reflux disease)     Hyperlipemia     Hypertension     Hypothyroidism        Past Surgical History:  Past Surgical History:   Procedure Laterality Date    CHOLECYSTECTOMY      CORONARY ARTERY BYPASS GRAFT      KNEE SURGERY           Family History:  No family history on file.    Social History:  Social History     Tobacco Use    Smoking status: Former Smoker "   Substance and Sexual Activity    Alcohol use: Never     Frequency: Never    Drug use: Not Currently    Sexual activity: Not on file        Review of Systems     Review of Systems   Constitutional: Negative for diaphoresis and fever.   HENT: Negative for sore throat.    Respiratory: Negative for cough and shortness of breath.    Cardiovascular: Positive for chest pain. Negative for palpitations.   Gastrointestinal: Positive for nausea. Negative for vomiting.   Genitourinary: Negative for dysuria.   Musculoskeletal: Negative for back pain.   Skin: Negative for rash.   Neurological: Negative for dizziness, weakness and numbness.   Hematological: Does not bruise/bleed easily.   All other systems reviewed and are negative.       Physical Exam     Initial Vitals [10/29/19 0638]   BP Pulse Resp Temp SpO2   134/60 80 20 98 °F (36.7 °C) 98 %      MAP       --          Physical Exam  Nursing Notes and Vital Signs Reviewed.  Constitutional: Patient is in no apparent distress. Well-developed and well-nourished.  Head: Atraumatic. Normocephalic.  Eyes: PERRL. EOM intact. Conjunctivae are not pale. No scleral icterus.  ENT: Mucous membranes are moist. Oropharynx is clear and symmetric.    Neck: Supple. Full ROM. No lymphadenopathy.  Cardiovascular: Regular rate. Regular rhythm. No murmurs, rubs, or gallops. Distal pulses are 2+ and symmetric.  Pulmonary/Chest: No respiratory distress. Clear to auscultation bilaterally. No wheezing or rales.  Abdominal: Soft and non-distended.  mild epigastric tenderness..  No rebound, guarding, or rigidity. Good bowel sounds.  Genitourinary: No CVA tenderness  Musculoskeletal: Moves all extremities. No obvious deformities. No edema. No calf tenderness. Mild reproducible chest wall tenderness of bilateral anterior chest.  Skin: Warm and dry. No rash.  Neurological:  Alert, awake, and appropriate.  Normal speech.  No acute focal neurological deficits are appreciated. 2 through 12 intact  "bilaterally  Psychiatric: Normal affect. Good eye contact. Appropriate in content.     ED Course   Procedures  ED Vital Signs:  Vitals:    10/29/19 0638 10/29/19 0655 10/29/19 0702 10/29/19 0801   BP: 134/60   135/72   Pulse: 80 71  64   Resp: 20      Temp: 98 °F (36.7 °C)      TempSrc: Oral      SpO2: 98%   97%   Weight:   61.9 kg (136 lb 6.4 oz)    Height: 5' 2" (1.575 m)          Abnormal Lab Results:  Labs Reviewed   CBC W/ AUTO DIFFERENTIAL - Abnormal; Notable for the following components:       Result Value    RBC 3.94 (*)     Mean Corpuscular Hemoglobin 32.0 (*)     All other components within normal limits   COMPREHENSIVE METABOLIC PANEL - Abnormal; Notable for the following components:    Glucose 113 (*)     Creatinine 2.0 (*)     eGFR if  25 (*)     eGFR if non  22 (*)     All other components within normal limits   TROPONIN I - Abnormal; Notable for the following components:    Troponin I 0.055 (*)     All other components within normal limits   B-TYPE NATRIURETIC PEPTIDE - Abnormal; Notable for the following components:     (*)     All other components within normal limits   TROPONIN I        All Lab Results:  Results for orders placed or performed during the hospital encounter of 10/29/19   CBC auto differential   Result Value Ref Range    WBC 8.57 3.90 - 12.70 K/uL    RBC 3.94 (L) 4.00 - 5.40 M/uL    Hemoglobin 12.6 12.0 - 16.0 g/dL    Hematocrit 37.6 37.0 - 48.5 %    Mean Corpuscular Volume 95 82 - 98 fL    Mean Corpuscular Hemoglobin 32.0 (H) 27.0 - 31.0 pg    Mean Corpuscular Hemoglobin Conc 33.5 32.0 - 36.0 g/dL    RDW 12.9 11.5 - 14.5 %    Platelets 152 150 - 350 K/uL    MPV 11.8 9.2 - 12.9 fL    Immature Granulocytes 0.5 0.0 - 0.5 %    Gran # (ANC) 4.6 1.8 - 7.7 K/uL    Immature Grans (Abs) 0.04 0.00 - 0.04 K/uL    Lymph # 2.8 1.0 - 4.8 K/uL    Mono # 1.0 0.3 - 1.0 K/uL    Eos # 0.1 0.0 - 0.5 K/uL    Baso # 0.02 0.00 - 0.20 K/uL    nRBC 0 0 /100 WBC    Gran% " 53.9 38.0 - 73.0 %    Lymph% 32.4 18.0 - 48.0 %    Mono% 11.4 4.0 - 15.0 %    Eosinophil% 1.6 0.0 - 8.0 %    Basophil% 0.2 0.0 - 1.9 %    Differential Method Automated    Comprehensive metabolic panel   Result Value Ref Range    Sodium 137 136 - 145 mmol/L    Potassium 4.0 3.5 - 5.1 mmol/L    Chloride 101 95 - 110 mmol/L    CO2 24 23 - 29 mmol/L    Glucose 113 (H) 70 - 110 mg/dL    BUN, Bld 14 8 - 23 mg/dL    Creatinine 2.0 (H) 0.5 - 1.4 mg/dL    Calcium 9.7 8.7 - 10.5 mg/dL    Total Protein 6.5 6.0 - 8.4 g/dL    Albumin 3.5 3.5 - 5.2 g/dL    Total Bilirubin 0.7 0.1 - 1.0 mg/dL    Alkaline Phosphatase 92 55 - 135 U/L    AST 23 10 - 40 U/L    ALT 14 10 - 44 U/L    Anion Gap 12 8 - 16 mmol/L    eGFR if African American 25 (A) >60 mL/min/1.73 m^2    eGFR if non African American 22 (A) >60 mL/min/1.73 m^2   Troponin I #1   Result Value Ref Range    Troponin I 0.055 (H) 0.000 - 0.026 ng/mL   B-Type natriuretic peptide (BNP)   Result Value Ref Range     (H) 0 - 99 pg/mL         Imaging Results:  Imaging Results          X-Ray Chest AP Portable (Final result)  Result time 10/29/19 07:56:57    Final result by Lanre Reno MD (10/29/19 07:56:57)                 Impression:      In comparison to the prior study, there is no adverse interval changes      Electronically signed by: Lanre Reno MD  Date:    10/29/2019  Time:    07:56             Narrative:    EXAMINATION:  XR CHEST AP PORTABLE    CLINICAL HISTORY:  Chest Pain;    TECHNIQUE:  Single frontal view of the chest was performed.    COMPARISON:  10/25/2019.    FINDINGS:  Small left-sided pleural effusion with left basilar atelectasis suspected.  No pneumothorax.  Chronic interstitial changes are seen throughout the lungs similar to prior.  Heart size is enlarged but stable.  Aorta demonstrates atherosclerotic disease.  Bones demonstrate moderate degenerative changes.  Sternotomy wires are intact.  In comparison to the prior study, there is no adverse  interval changes                                 The EKG was ordered, reviewed, and independently interpreted by the ED provider.  Interpretation time: 0653  Rate: 71 BPM  Rhythm: normal sinus rhythm  Interpretation: Left ventricular hypertrophy with repolarization abnormality. Inferior infarct. No STEMI.              The Emergency Provider reviewed the vital signs and test results, which are outlined above.     ED Discussion     8:34 AM: Re-evaluated pt. Pt is resting comfortably and is in no acute distress.  Pt states she is mildly improved but that she is still having minor sxs.  D/w pt and family all pertinent results. D/w pt and family any concerns expressed at this time. Answered all questions. Pt expresses understanding at this time.    8:58 AM: Discussed pt's case with Dr. Velazquez (cardiology) who recommends to admit pt to medicine.  Patient will likely get a nuclear stress test today.    9:35 AM: Discussed case with Devora Alva NP ([facility]). Dr. Grove agrees with current care and management of pt and accepts admission.   Admitting Service: Hospital Medicine  Admitting Physician: Dr. Grove  Admit to: Obs tele    9:39 AM: Re-evaluated pt. I have discussed test results, shared treatment plan, and the need for admission with patient and family at bedside. Pt and family express understanding at this time and agree with all information. All questions answered. Pt and family have no further questions or concerns at this time. Pt is ready for admit.           Medical Decision Making:   Clinical Tests:   Lab Tests: Ordered and Reviewed  Radiological Study: Ordered and Reviewed  Medical Tests: Ordered and Reviewed           ED Medication(s):  Medications   aspirin tablet 325 mg (325 mg Oral Not Given 10/29/19 0645)   regadenoson injection 0.4 mg (has no administration in time range)   furosemide injection 40 mg (has no administration in time range)   famotidine IVPB 20 mg (0 mg Intravenous  Stopped 10/29/19 0726)   GI cocktail (mylanta 30 mL, lidocaine 2 % viscous 10 mL, dicyclomine 10 mL) 50 mL (50 mLs Oral Given 10/29/19 0656)   morphine injection 4 mg (4 mg Intravenous Given 10/29/19 0842)   ondansetron injection 4 mg (4 mg Intravenous Given 10/29/19 0842)       New Prescriptions    No medications on file               Scribe Attestation:   Scribe #1: I performed the above scribed service and the documentation accurately describes the services I performed. I attest to the accuracy of the note.     Attending:   Physician Attestation Statement for Scribe #1: I, Giancarlo Colin Jr., MD, personally performed the services described in this documentation, as scribed by Davy Hough, in my presence, and it is both accurate and complete.           Clinical Impression       ICD-10-CM ICD-9-CM   1. Chest pain R07.9 786.50   2. Elevated troponin R79.89 790.6       Disposition:   Disposition: Placed in Observation  Condition: Stable         Giancarlo Colin Jr., MD  10/29/19 9551

## 2019-10-29 NOTE — ASSESSMENT & PLAN NOTE
-Troponin 0.055  -MPI stress test today  -Continue current medical management  -Continue ASA, Statin, Norvasc, Hydralazine  -BB on hold given bradycardia  -Further recs to follow stress test

## 2019-10-29 NOTE — PROGRESS NOTES
Pt with achy,/pull chest pain. Mid sternum. Pt states burning is less. Troponin decrease. Will notifiy miguel,np

## 2019-10-29 NOTE — HPI
Danielle Cruz is a 88 year old female who presented to [facility] due to nausea and chest tightness which started while lying in bed last PM. She was discharged from this facility yesterday. Her current medical conditions include CAD s/p CABG (2005), Chronic diastolic CHF, HTN, HLP GERD, s/p cholecystectomy, Thyroid disease. ED workup revealed Troponin 0.55, , K+ 4.0, Cr 2.0. Cardiology consulted to assist with medical management. Chart reviewed, patient seen and examined. Denies chest pain on exam today. She continues to endorse soreness to chest wall with TTP today on exam. Given her CAD and CABG history will obtain MPI stress test today and optimize medical regimen as BP allows. Further recs to follow.

## 2019-10-29 NOTE — H&P
"[facility] Hospital Medicine  History & Physical    Patient Name: Danielle Cruz  MRN: [id number]  Admission Date: 10/29/2019  Attending Physician: Xavier Grove MD   Primary Care Provider: Jan Qureshi MD         Patient information was obtained from patient, relative(s) and ER records.     Subjective:     Principal Problem:Chest pain    Chief Complaint:   Chief Complaint   Patient presents with    Abdominal Pain     "burning" epigastric pain radiating into chest with nausea and reflux, worsens with palpitation        HPI: Danielle Cruz is a 88 year old female with PMHx of CAD s/p CABG (2005), Chronic diastolic CHF, HTN, HLP GERD, s/p cholecystectomy and Thyroid disease who presents to the Emergency Department for evaluation of mid sternal chest pain which onset this morning. Pt describes the pain as sharp initially then became dull. Associated sxs include nausea and metallic taste in mouth. She was discharged from this facility yesterday after being treated for CHF and acid reflux. In the ED, troponin was elevated to 0.55, , K+ 4.0, Cr 2.0. Cardiology consulted in the ED. MPI stress test planned for today. Code status was discussed with the patient and her son, Leland Cruz Jr. She is a full code and her son is his surrogate medical decision maker.          Past Medical History:   Diagnosis Date    CHF (congestive heart failure)     CVA (cerebral vascular accident)     Depression     GERD (gastroesophageal reflux disease)     Hyperlipemia     Hypertension     Hypothyroidism        Past Surgical History:   Procedure Laterality Date    CHOLECYSTECTOMY      CORONARY ARTERY BYPASS GRAFT      KNEE SURGERY         Review of patient's allergies indicates:   Allergen Reactions    Lisinopril Blisters       Current Facility-Administered Medications on File Prior to Encounter   Medication    [COMPLETED] pneumoc 13-radha conj-dip cr(PF) (PREVNAR 13 (PF)) 0.5 mL    " [DISCONTINUED] acetaminophen tablet 650 mg    [DISCONTINUED] aluminum-magnesium hydroxide-simethicone 200-200-20 mg/5 mL suspension 30 mL    [DISCONTINUED] amLODIPine tablet 5 mg    [DISCONTINUED] aspirin EC tablet 81 mg    [DISCONTINUED] atorvastatin tablet 40 mg    [DISCONTINUED] busPIRone tablet 15 mg    [DISCONTINUED] heparin (porcine) injection 5,000 Units    [DISCONTINUED] hydrALAZINE tablet 25 mg    [DISCONTINUED] HYDROcodone-acetaminophen 5-325 mg per tablet 1 tablet    [DISCONTINUED] levothyroxine tablet 25 mcg    [DISCONTINUED] ondansetron disintegrating tablet 8 mg    [DISCONTINUED] pantoprazole EC tablet 40 mg    [DISCONTINUED] traMADol tablet 50 mg     Current Outpatient Medications on File Prior to Encounter   Medication Sig    aluminum-magnesium hydroxide-simethicone (MAALOX) 200-200-20 mg/5 mL Susp Take 30 mLs by mouth every 6 (six) hours as needed.    aspirin (ECOTRIN) 81 MG EC tablet Take 81 mg by mouth once daily.    atorvastatin (LIPITOR) 40 MG tablet Take 40 mg by mouth nightly.    hydrALAZINE (APRESOLINE) 25 MG tablet Take 25 mg by mouth once daily.    levothyroxine (SYNTHROID) 25 MCG tablet Take 25 mcg by mouth once daily.    ondansetron (ZOFRAN-ODT) 4 MG TbDL     pantoprazole (PROTONIX) 40 MG tablet Take 1 tablet (40 mg total) by mouth once daily.    traMADol (ULTRAM) 50 mg tablet Take 1 tablet (50 mg total) by mouth every 8 (eight) hours as needed for Pain.    amLODIPine (NORVASC) 5 MG tablet Take 1 tablet (5 mg total) by mouth once daily.    furosemide (LASIX) 20 MG tablet Take 20 mg by mouth daily as needed.    HYDROcodone-acetaminophen (NORCO) 5-325 mg per tablet Take 0.5 tablets by mouth as needed.     Family History     None        Tobacco Use    Smoking status: Former Smoker   Substance and Sexual Activity    Alcohol use: Never     Frequency: Never    Drug use: Not Currently    Sexual activity: Not on file     Review of Systems   Constitutional: Negative for  appetite change, chills, diaphoresis, fatigue and fever.   HENT: Negative for congestion, ear pain, mouth sores, sore throat and trouble swallowing.    Eyes: Negative for pain and visual disturbance.   Respiratory: Negative for cough, chest tightness and shortness of breath.    Cardiovascular: Positive for chest pain. Negative for palpitations and leg swelling.   Gastrointestinal: Negative for abdominal pain, constipation and nausea.        Positive for reflux.    Endocrine: Negative for cold intolerance, heat intolerance, polydipsia and polyuria.   Genitourinary: Negative for dysuria, frequency and hematuria.   Musculoskeletal: Positive for back pain (chronic ). Negative for arthralgias, myalgias and neck pain.   Skin: Negative for pallor, rash and wound.   Allergic/Immunologic: Negative for environmental allergies and immunocompromised state.   Neurological: Positive for weakness (generalized). Negative for dizziness, seizures, syncope, numbness and headaches.   Hematological: Negative for adenopathy. Does not bruise/bleed easily.   Psychiatric/Behavioral: Negative for agitation, confusion and sleep disturbance.     Objective:     Vital Signs (Most Recent):  Temp: 98 °F (36.7 °C) (10/29/19 0638)  Pulse: 67 (10/29/19 1048)  Resp: 20 (10/29/19 1048)  BP: 136/60 (10/29/19 1048)  SpO2: 98 % (10/29/19 1048) Vital Signs (24h Range):  Temp:  [97.4 °F (36.3 °C)-98 °F (36.7 °C)] 98 °F (36.7 °C)  Pulse:  [59-80] 67  Resp:  [16-20] 20  SpO2:  [93 %-98 %] 98 %  BP: (134-155)/(60-72) 136/60     Weight: 61.9 kg (136 lb 6.4 oz)  Body mass index is 24.95 kg/m².    Physical Exam   Constitutional: She is oriented to person, place, and time. She appears well-developed and well-nourished.   Elderly, frail    HENT:   Head: Normocephalic and atraumatic.   Eyes: Pupils are equal, round, and reactive to light. Conjunctivae and EOM are normal.   Neck: Normal range of motion. Neck supple.   Cardiovascular: Normal rate, regular rhythm,  normal heart sounds and intact distal pulses.   Pulmonary/Chest: Effort normal. She has decreased breath sounds in the right lower field and the left lower field.   Abdominal: Soft. Bowel sounds are normal.   Musculoskeletal: Normal range of motion.        Arms:  Neurological: She is alert and oriented to person, place, and time. She has normal reflexes.   Skin: Skin is warm and dry.   Psychiatric: She has a normal mood and affect. Her behavior is normal. Judgment and thought content normal.   Nursing note and vitals reviewed.       Significant Labs:   BMP:   Recent Labs   Lab 10/28/19  0430 10/29/19  0714    113*    137   K 4.1 4.0    101   CO2 24 24   BUN 14 14   CREATININE 2.0* 2.0*   CALCIUM 9.4 9.7   MG 2.1  --      CBC:   Recent Labs   Lab 10/28/19  0430 10/29/19  0714   WBC 7.28 8.57   HGB 12.5 12.6   HCT 38.8 37.6   * 152     CMP:   Recent Labs   Lab 10/28/19  0430 10/29/19  0714    137   K 4.1 4.0    101   CO2 24 24    113*   BUN 14 14   CREATININE 2.0* 2.0*   CALCIUM 9.4 9.7   PROT  --  6.5   ALBUMIN  --  3.5   BILITOT  --  0.7   ALKPHOS  --  92   AST  --  23   ALT  --  14   ANIONGAP 11 12   EGFRNONAA 22* 22*     Cardiac Markers:   Recent Labs   Lab 10/29/19  0714   *     Troponin:   Recent Labs   Lab 10/29/19  0714 10/29/19  0946   TROPONINI 0.055* 0.044*     All pertinent labs within the past 24 hours have been reviewed.    Significant Imaging:   Imaging Results          X-Ray Chest AP Portable (Final result)  Result time 10/29/19 07:56:57    Final result by Lanre Reno MD (10/29/19 07:56:57)                 Impression:      In comparison to the prior study, there is no adverse interval changes      Electronically signed by: Lanre Reno MD  Date:    10/29/2019  Time:    07:56             Narrative:    EXAMINATION:  XR CHEST AP PORTABLE    CLINICAL HISTORY:  Chest Pain;    TECHNIQUE:  Single frontal view of the chest was  performed.    COMPARISON:  10/25/2019.    FINDINGS:  Small left-sided pleural effusion with left basilar atelectasis suspected.  No pneumothorax.  Chronic interstitial changes are seen throughout the lungs similar to prior.  Heart size is enlarged but stable.  Aorta demonstrates atherosclerotic disease.  Bones demonstrate moderate degenerative changes.  Sternotomy wires are intact.  In comparison to the prior study, there is no adverse interval changes                              Assessment/Plan:     * Chest pain with elevated troponin  -Initial troponin 0.055  -Trend troponin.   -Nuclear stress test today   -Continue ASA, Statin, Norvasc, Hydralazine  -Hold metoprolol due to bradycardia  -Cardiology following       Acute on chronic diastolic congestive heart failure  -  -IV lasix 40 mg x 1 dose given in ED   -MPI stress test today  -Strict intake and output   -Na and fluid restriction   -Daily weights  Cardiology following     Stage 3 chronic kidney disease  Creatinine 2.0 (baseline around 1.8)  Stable   BMP daily       Hyperlipemia  Resume statin       GERD (gastroesophageal reflux disease)  Continue Protonix       Hypertension  Continue Norvasc and Hydralazine      Hypothyroidism  -Continue levothyroxine      VTE Risk Mitigation (From admission, onward)         Ordered     Place sequential compression device  Until discontinued      10/29/19 1015     IP VTE HIGH RISK PATIENT  Once      10/29/19 1015                   Devora Alva NP  Department of Hospital Medicine   [facility]

## 2019-10-29 NOTE — ED NOTES
Pt presents to ED today with c/o chest pain. Pt describes symptoms as non-radiating epigastric pain. Associated symptoms include nausea/vomiting & metallic taste. Patient denies any fever, chills, sore throat, cough, diarrhea, SOB, HA, syncope, weakness, light-headedness, numbness, seizures, and all other sxs at this time.    Patient identifiers verified and correct for Danielle Cruz.    Level of Consciousness: The patient is awake, alert and aware of environment with an appropriate affect, the patient is oriented x 3 and speaking appropriately.  Appearance: Patient resting comfortably and in no acute distress, patient is clean and well groomed, patient's clothing is properly fastened.  Skin: The skin is warm and dry, color consistent with ethnicity, patient has normal skin turgor and moist mucus membranes, skin intact, no breakdown noted. Bruises noted to pt's left AC from d/c from hospital yesterday.  Musculoskeletal: Moves all extremities well in full range of motion. No obvious deformities or swelling noted. +generalized weakness, pt needs assistance ambulating  Respiratory: Airway open and patent, respirations spontaneous, even and unlabored. No accessory muscles in use. Breath sounds diminished  Cardiac: Patient has a normal rate, +2 edema to BLE, capillary refill < 3 seconds. good pulses palpated peripherally. +epigastric chest pain  Abdomen: Soft, non-tender to palpation. No distention noted. +n/v  Neurologic: PERRLA, face exhibits symmetrical expression, hand grasps equal and even bilaterally, reports normal sensation to all extremities and face.  Psychosocial: Normal affect. Good eye contact. Appropriate in content.    Patient verbalized understanding of status and plan of care. Patient changed into hospital gown with assistance. Side rails up x 2, call light in reach, bed low and locked. Cardiac monitor applied to patient; alarms on & audible. WCTM.

## 2019-10-29 NOTE — CONSULTS
[facility] -   Cardiology  Consult Note    Patient Name: Danielle Cruz  MRN: [id number]  Admission Date: 10/29/2019  Hospital Length of Stay: 0 days  Code Status: Prior   Attending Provider: Giancarlo Colin Jr., MD   Consulting Provider: LUTHER Merida  Primary Care Physician: Jan Qureshi MD  Principal Problem:Chest pain    Patient information was obtained from patient, relative(s), past medical records and ER records.     Inpatient consult to Cardiology  Consult performed by: LUTHER Baca  Consult ordered by: Giancarlo Colin Jr., MD        Subjective:     Chief Complaint:  Chest pain     HPI:   Danielle Cruz is a 88 year old female who presented to [facility] due to nausea and chest tightness which started while lying in bed last PM. She was discharged from this facility yesterday. Her current medical conditions include CAD s/p CABG (2005), Chronic diastolic CHF, HTN, HLP GERD, s/p cholecystectomy, Thyroid disease. ED workup revealed Troponin 0.55, , K+ 4.0, Cr 2.0. Cardiology consulted to assist with medical management. Chart reviewed, patient seen and examined. Denies chest pain on exam today. She continues to endorse soreness to chest wall with TTP today on exam. Given her CAD and CABG history will obtain MPI stress test today and optimize medical regimen as BP allows. Further recs to follow.     Past Medical History:   Diagnosis Date    CHF (congestive heart failure)     CVA (cerebral vascular accident)     Depression     GERD (gastroesophageal reflux disease)     Hyperlipemia     Hypertension     Hypothyroidism        Past Surgical History:   Procedure Laterality Date    CHOLECYSTECTOMY      CORONARY ARTERY BYPASS GRAFT      KNEE SURGERY         Review of patient's allergies indicates:   Allergen Reactions    Lisinopril Blisters       Current Facility-Administered Medications on File Prior to Encounter   Medication    [COMPLETED] pneumoc 13-radha  conj-dip cr(PF) (PREVNAR 13 (PF)) 0.5 mL    [DISCONTINUED] acetaminophen tablet 650 mg    [DISCONTINUED] aluminum-magnesium hydroxide-simethicone 200-200-20 mg/5 mL suspension 30 mL    [DISCONTINUED] amLODIPine tablet 5 mg    [DISCONTINUED] aspirin EC tablet 81 mg    [DISCONTINUED] atorvastatin tablet 40 mg    [DISCONTINUED] busPIRone tablet 15 mg    [DISCONTINUED] heparin (porcine) injection 5,000 Units    [DISCONTINUED] hydrALAZINE tablet 25 mg    [DISCONTINUED] HYDROcodone-acetaminophen 5-325 mg per tablet 1 tablet    [DISCONTINUED] levothyroxine tablet 25 mcg    [DISCONTINUED] ondansetron disintegrating tablet 8 mg    [DISCONTINUED] pantoprazole EC tablet 40 mg    [DISCONTINUED] traMADol tablet 50 mg     Current Outpatient Medications on File Prior to Encounter   Medication Sig    aluminum-magnesium hydroxide-simethicone (MAALOX) 200-200-20 mg/5 mL Susp Take 30 mLs by mouth every 6 (six) hours as needed.    aspirin (ECOTRIN) 81 MG EC tablet Take 81 mg by mouth once daily.    atorvastatin (LIPITOR) 40 MG tablet Take 40 mg by mouth nightly.    hydrALAZINE (APRESOLINE) 25 MG tablet Take 25 mg by mouth once daily.    levothyroxine (SYNTHROID) 25 MCG tablet Take 25 mcg by mouth once daily.    ondansetron (ZOFRAN-ODT) 4 MG TbDL     pantoprazole (PROTONIX) 40 MG tablet Take 1 tablet (40 mg total) by mouth once daily.    traMADol (ULTRAM) 50 mg tablet Take 1 tablet (50 mg total) by mouth every 8 (eight) hours as needed for Pain.    amLODIPine (NORVASC) 5 MG tablet Take 1 tablet (5 mg total) by mouth once daily.    furosemide (LASIX) 20 MG tablet Take 20 mg by mouth daily as needed.    HYDROcodone-acetaminophen (NORCO) 5-325 mg per tablet Take 0.5 tablets by mouth as needed.     Family History     None        Tobacco Use    Smoking status: Former Smoker   Substance and Sexual Activity    Alcohol use: Never     Frequency: Never    Drug use: Not Currently    Sexual activity: Not on file      Review of Systems   Constitution: Positive for malaise/fatigue.   HENT: Negative for hearing loss and hoarse voice.    Eyes: Negative for blurred vision and visual disturbance.   Cardiovascular: Positive for chest pain (TTP) and dyspnea on exertion. Negative for claudication, irregular heartbeat, leg swelling, near-syncope, orthopnea, palpitations, paroxysmal nocturnal dyspnea and syncope.   Respiratory: Positive for shortness of breath. Negative for cough, hemoptysis, sleep disturbances due to breathing, snoring and wheezing.    Endocrine: Negative for cold intolerance and heat intolerance.   Hematologic/Lymphatic: Bruises/bleeds easily.   Skin: Negative for color change, dry skin and nail changes.   Musculoskeletal: Positive for arthritis and back pain. Negative for joint pain and myalgias.   Gastrointestinal: Positive for heartburn and nausea. Negative for bloating, abdominal pain, constipation and vomiting.   Genitourinary: Negative for dysuria, flank pain, hematuria and hesitancy.   Neurological: Positive for weakness. Negative for headaches, light-headedness, loss of balance, numbness and paresthesias.   Psychiatric/Behavioral: Negative for altered mental status.   Allergic/Immunologic: Negative for environmental allergies.     Objective:     Vital Signs (Most Recent):  Temp: 98 °F (36.7 °C) (10/29/19 0638)  Pulse: 64 (10/29/19 0801)  Resp: 20 (10/29/19 0638)  BP: 135/72 (10/29/19 0801)  SpO2: 97 % (10/29/19 0801) Vital Signs (24h Range):  Temp:  [97.4 °F (36.3 °C)-98 °F (36.7 °C)] 98 °F (36.7 °C)  Pulse:  [59-88] 64  Resp:  [16-20] 20  SpO2:  [93 %-98 %] 97 %  BP: (134-155)/(60-72) 135/72     Weight: 61.9 kg (136 lb 6.4 oz)  Body mass index is 24.95 kg/m².    SpO2: 97 %  O2 Device (Oxygen Therapy): room air      Intake/Output Summary (Last 24 hours) at 10/29/2019 0923  Last data filed at 10/29/2019 0787  Gross per 24 hour   Intake 50 ml   Output --   Net 50 ml       Lines/Drains/Airways     Peripheral  Intravenous Line                 Peripheral IV - Single Lumen 10/29/19 0714 20 G Left Antecubital less than 1 day                Physical Exam   Constitutional: She is oriented to person, place, and time. She appears well-developed and well-nourished. No distress.   HENT:   Head: Normocephalic and atraumatic.   Eyes: Pupils are equal, round, and reactive to light.   Neck: Normal range of motion and full passive range of motion without pain. Neck supple. No JVD present.   Cardiovascular: Normal rate, regular rhythm, S1 normal, S2 normal and intact distal pulses. PMI is not displaced. Exam reveals no distant heart sounds.   No murmur heard.  Pulses:       Radial pulses are 2+ on the right side, and 2+ on the left side.        Dorsalis pedis pulses are 2+ on the right side, and 2+ on the left side.   Chest soreness TTP on exam today   Pulmonary/Chest: Effort normal. No accessory muscle usage. No respiratory distress. She has decreased breath sounds in the right lower field and the left lower field. She has no wheezes. She has no rales.   +NEGRON, chronic   Abdominal: Soft. Bowel sounds are normal. She exhibits no distension. There is no tenderness.   Musculoskeletal: Normal range of motion. She exhibits no edema.        Right ankle: She exhibits no swelling.        Left ankle: She exhibits no swelling.   Neurological: She is alert and oriented to person, place, and time.   Skin: Skin is warm and dry. She is not diaphoretic. No cyanosis. Nails show no clubbing.   Psychiatric: She has a normal mood and affect. Her speech is normal and behavior is normal. Judgment and thought content normal. Cognition and memory are normal.   Nursing note and vitals reviewed.      Significant Labs:   BMP:   Recent Labs   Lab 10/28/19  0430 10/29/19  0714    113*    137   K 4.1 4.0    101   CO2 24 24   BUN 14 14   CREATININE 2.0* 2.0*   CALCIUM 9.4 9.7   MG 2.1  --    , CMP   Recent Labs   Lab 10/28/19  0430 10/29/19  0714     137   K 4.1 4.0    101   CO2 24 24    113*   BUN 14 14   CREATININE 2.0* 2.0*   CALCIUM 9.4 9.7   PROT  --  6.5   ALBUMIN  --  3.5   BILITOT  --  0.7   ALKPHOS  --  92   AST  --  23   ALT  --  14   ANIONGAP 11 12   ESTGFRAFRICA 25* 25*   EGFRNONAA 22* 22*   , CBC   Recent Labs   Lab 10/28/19  0430 10/29/19  0714   WBC 7.28 8.57   HGB 12.5 12.6   HCT 38.8 37.6   * 152   , Troponin   Recent Labs   Lab 10/29/19  0714   TROPONINI 0.055*    and All pertinent lab results from the last 24 hours have been reviewed.    Significant Imaging: Echocardiogram:   2D echo with color flow doppler:   Results for orders placed or performed during the hospital encounter of 10/25/19   2D echo with color flow doppler   Result Value Ref Range    QEF 55 55 - 65    Diastolic Dysfunction Yes (A)     Aortic Valve Stenosis MILD (A)     Est. PA Systolic Pressure 37.45     Mitral Valve Mobility MILDLY RESTRICTED     Tricuspid Valve Regurgitation MILD     Narrative    Date of Procedure: 10/27/2019        TEST DESCRIPTION   Technical Quality: This is a technically challenging study. There is poor endocardial definition.     Aorta: The aortic root is normal in size, measuring 2.0 cm at sinotubular junction and 2.4 cm at Sinuses of Valsalva. The proximal ascending aorta is normal in size, measuring 2.8 cm across.     Left Atrium: The left atrium is normal in size, measuring 5.2 cm across in the parasternal view, and 6.4 cm across in the apical view.     Left Ventricle: The left ventricle is normal in size, with an end-diastolic diameter of 4.8 cm, and an end-systolic diameter of 3.0 cm. LV wall thickness is normal, with the septum measuring 0.9 cm and the posterior wall measuring 1.0 cm across. There   are no regional wall motion abnormalities. Left ventricular systolic function appears normal. Visually estimated ejection fraction is 55-60%. The LV Doppler derived stroke volume equals 63.0 ccs.     Diastolic indices: E  wave velocity 0.8 m/s, E/A ratio 0.9,  msec., E/e' ratio(avg) 17. There is pseudonormalization of mitral inflow pattern consistent with significant diastolic dysfunction.     Right Atrium: The right atrium is normal in size, measuring 5.8 cm in length and 3.1 cm in width in the apical view.     Right Ventricle: The right ventricle is normal in size measuring 2.9 cm at the base in the apical right ventricle-focused view. Global right ventricular systolic function appears normal. The estimated PA systolic pressure is greater than 37 mmHg.     Aortic Valve:  The aortic valve is mildly sclerotic with normal leaflet mobility. The peak velocity obtained across the aortic valve is 1.84 m/s, which translates to a peak gradient of 14 mmHg. The mean gradient is 5 mmHg. Using a left ventricular   outflow tract diameter of 2.0 cm, a left ventricular outflow tract velocity time integral of 20 cm, and a peak instantaneous transvalvular velocity time integral of 37 cm, the calculated aortic valve area is 1.73 cm2, consistent with mild aortic   stenosis. There is aortic annular calcification.     Mitral Valve:  The mitral valve is mildly sclerotic with mildly restricted leaflet mobility. There is mitral annular calcification.     Tricuspid Valve:  The tricuspid valve is normal in structure with normal leaflet mobility. There is mild tricuspid regurgitation.     Pulmonary Valve:  The pulmonic valve is not well seen.     IVC: The IVC is not visualized.     Intracavitary: There is no evidence of pericardial effusion, intracavity mass, thrombi, or vegetation.         CONCLUSIONS     1 - Normal left ventricular systolic function (EF 55-60%).     2 - Impaired LV relaxation, elevated LAP (grade 2 diastolic dysfunction).     3 - Normal right ventricular systolic function .     4 - No wall motion abnormalities.     5 - The estimated PA systolic pressure is greater than 37 mmHg.     6 - Mild aortic stenosis, ARMANDO = 1.73 cm2, peak  velocity = 1.84 m/s, mean gradient = 5 mmHg.     7 - The mitral valve is mildly sclerotic with mildly restricted leaflet mobility.     8 - Mild tricuspid regurgitation.             This document has been electronically    SIGNED BY: Anton Velazquez MD On: 10/27/2019 11:36    and X-Ray: CXR: X-Ray Chest 1 View (CXR): No results found for this visit on 10/29/19. and X-Ray Chest PA and Lateral (CXR): No results found for this visit on 10/29/19.    Assessment and Plan:     * Chest pain with elevated troponin  -Troponin 0.055  -MPI stress test today  -Continue current medical management  -Continue ASA, Statin, Norvasc, Hydralazine  -BB on hold given bradycardia  -Further recs to follow stress test    Acute on chronic diastolic congestive heart failure  -, improved from recent hospital stay  -IV lasix 40 mg x 1 dose today in ED  -MPI stress test today  -Dash diet, 2 gm sodium restriction  -1500 ml fluid restriction  -Daily weights  -Strict intake and output  -Continue medical therapy    Hyperlipemia  -continue statin    Hypertension  -on medical therapy  Continue Norvasc, Hydralazine    Hypothyroidism  -continue synthroid        VTE Risk Mitigation (From admission, onward)    None          Thank you for your consult. I will follow-up with patient. Please contact us if you have any additional questions.    LUTHER Merida  Cardiology   [facility] [place]

## 2019-10-29 NOTE — SUBJECTIVE & OBJECTIVE
Past Medical History:   Diagnosis Date    CHF (congestive heart failure)     CVA (cerebral vascular accident)     Depression     GERD (gastroesophageal reflux disease)     Hyperlipemia     Hypertension     Hypothyroidism        Past Surgical History:   Procedure Laterality Date    CHOLECYSTECTOMY      CORONARY ARTERY BYPASS GRAFT      KNEE SURGERY         Review of patient's allergies indicates:   Allergen Reactions    Lisinopril Blisters       Current Facility-Administered Medications on File Prior to Encounter   Medication    [COMPLETED] pneumoc 13-radha conj-dip cr(PF) (PREVNAR 13 (PF)) 0.5 mL    [DISCONTINUED] acetaminophen tablet 650 mg    [DISCONTINUED] aluminum-magnesium hydroxide-simethicone 200-200-20 mg/5 mL suspension 30 mL    [DISCONTINUED] amLODIPine tablet 5 mg    [DISCONTINUED] aspirin EC tablet 81 mg    [DISCONTINUED] atorvastatin tablet 40 mg    [DISCONTINUED] busPIRone tablet 15 mg    [DISCONTINUED] heparin (porcine) injection 5,000 Units    [DISCONTINUED] hydrALAZINE tablet 25 mg    [DISCONTINUED] HYDROcodone-acetaminophen 5-325 mg per tablet 1 tablet    [DISCONTINUED] levothyroxine tablet 25 mcg    [DISCONTINUED] ondansetron disintegrating tablet 8 mg    [DISCONTINUED] pantoprazole EC tablet 40 mg    [DISCONTINUED] traMADol tablet 50 mg     Current Outpatient Medications on File Prior to Encounter   Medication Sig    aluminum-magnesium hydroxide-simethicone (MAALOX) 200-200-20 mg/5 mL Susp Take 30 mLs by mouth every 6 (six) hours as needed.    aspirin (ECOTRIN) 81 MG EC tablet Take 81 mg by mouth once daily.    atorvastatin (LIPITOR) 40 MG tablet Take 40 mg by mouth nightly.    hydrALAZINE (APRESOLINE) 25 MG tablet Take 25 mg by mouth once daily.    levothyroxine (SYNTHROID) 25 MCG tablet Take 25 mcg by mouth once daily.    ondansetron (ZOFRAN-ODT) 4 MG TbDL     pantoprazole (PROTONIX) 40 MG tablet Take 1 tablet (40 mg total) by mouth once daily.    traMADol  (ULTRAM) 50 mg tablet Take 1 tablet (50 mg total) by mouth every 8 (eight) hours as needed for Pain.    amLODIPine (NORVASC) 5 MG tablet Take 1 tablet (5 mg total) by mouth once daily.    furosemide (LASIX) 20 MG tablet Take 20 mg by mouth daily as needed.    HYDROcodone-acetaminophen (NORCO) 5-325 mg per tablet Take 0.5 tablets by mouth as needed.     Family History     None        Tobacco Use    Smoking status: Former Smoker   Substance and Sexual Activity    Alcohol use: Never     Frequency: Never    Drug use: Not Currently    Sexual activity: Not on file     Review of Systems   Constitutional: Negative for appetite change, chills, diaphoresis, fatigue and fever.   HENT: Negative for congestion, ear pain, mouth sores, sore throat and trouble swallowing.    Eyes: Negative for pain and visual disturbance.   Respiratory: Negative for cough, chest tightness and shortness of breath.    Cardiovascular: Positive for chest pain. Negative for palpitations and leg swelling.   Gastrointestinal: Negative for abdominal pain, constipation and nausea.        Positive for reflux.    Endocrine: Negative for cold intolerance, heat intolerance, polydipsia and polyuria.   Genitourinary: Negative for dysuria, frequency and hematuria.   Musculoskeletal: Positive for back pain (chronic ). Negative for arthralgias, myalgias and neck pain.   Skin: Negative for pallor, rash and wound.   Allergic/Immunologic: Negative for environmental allergies and immunocompromised state.   Neurological: Positive for weakness (generalized). Negative for dizziness, seizures, syncope, numbness and headaches.   Hematological: Negative for adenopathy. Does not bruise/bleed easily.   Psychiatric/Behavioral: Negative for agitation, confusion and sleep disturbance.     Objective:     Vital Signs (Most Recent):  Temp: 98 °F (36.7 °C) (10/29/19 0638)  Pulse: 67 (10/29/19 1048)  Resp: 20 (10/29/19 1048)  BP: 136/60 (10/29/19 1048)  SpO2: 98 % (10/29/19  1048) Vital Signs (24h Range):  Temp:  [97.4 °F (36.3 °C)-98 °F (36.7 °C)] 98 °F (36.7 °C)  Pulse:  [59-80] 67  Resp:  [16-20] 20  SpO2:  [93 %-98 %] 98 %  BP: (134-155)/(60-72) 136/60     Weight: 61.9 kg (136 lb 6.4 oz)  Body mass index is 24.95 kg/m².    Physical Exam   Constitutional: She is oriented to person, place, and time. She appears well-developed and well-nourished.   Elderly, frail    HENT:   Head: Normocephalic and atraumatic.   Eyes: Pupils are equal, round, and reactive to light. Conjunctivae and EOM are normal.   Neck: Normal range of motion. Neck supple.   Cardiovascular: Normal rate, regular rhythm, normal heart sounds and intact distal pulses.   Pulmonary/Chest: Effort normal. She has decreased breath sounds in the right lower field and the left lower field.   Abdominal: Soft. Bowel sounds are normal.   Musculoskeletal: Normal range of motion.        Arms:  Neurological: She is alert and oriented to person, place, and time. She has normal reflexes.   Skin: Skin is warm and dry.   Psychiatric: She has a normal mood and affect. Her behavior is normal. Judgment and thought content normal.   Nursing note and vitals reviewed.       Significant Labs:   BMP:   Recent Labs   Lab 10/28/19  0430 10/29/19  0714    113*    137   K 4.1 4.0    101   CO2 24 24   BUN 14 14   CREATININE 2.0* 2.0*   CALCIUM 9.4 9.7   MG 2.1  --      CBC:   Recent Labs   Lab 10/28/19  0430 10/29/19  0714   WBC 7.28 8.57   HGB 12.5 12.6   HCT 38.8 37.6   * 152     CMP:   Recent Labs   Lab 10/28/19  0430 10/29/19  0714    137   K 4.1 4.0    101   CO2 24 24    113*   BUN 14 14   CREATININE 2.0* 2.0*   CALCIUM 9.4 9.7   PROT  --  6.5   ALBUMIN  --  3.5   BILITOT  --  0.7   ALKPHOS  --  92   AST  --  23   ALT  --  14   ANIONGAP 11 12   EGFRNONAA 22* 22*     Cardiac Markers:   Recent Labs   Lab 10/29/19  0714   *     Troponin:   Recent Labs   Lab 10/29/19  0714 10/29/19  0946   TROPONINI  0.055* 0.044*     All pertinent labs within the past 24 hours have been reviewed.    Significant Imaging:   Imaging Results          X-Ray Chest AP Portable (Final result)  Result time 10/29/19 07:56:57    Final result by Lanre Reno MD (10/29/19 07:56:57)                 Impression:      In comparison to the prior study, there is no adverse interval changes      Electronically signed by: Lanre Reno MD  Date:    10/29/2019  Time:    07:56             Narrative:    EXAMINATION:  XR CHEST AP PORTABLE    CLINICAL HISTORY:  Chest Pain;    TECHNIQUE:  Single frontal view of the chest was performed.    COMPARISON:  10/25/2019.    FINDINGS:  Small left-sided pleural effusion with left basilar atelectasis suspected.  No pneumothorax.  Chronic interstitial changes are seen throughout the lungs similar to prior.  Heart size is enlarged but stable.  Aorta demonstrates atherosclerotic disease.  Bones demonstrate moderate degenerative changes.  Sternotomy wires are intact.  In comparison to the prior study, there is no adverse interval changes

## 2019-10-29 NOTE — ASSESSMENT & PLAN NOTE
-Initial troponin 0.055  -Trend troponin.   -Nuclear stress test today   -Continue ASA, Statin, Norvasc, Hydralazine  -Hold metoprolol due to bradycardia  -Cardiology following

## 2019-10-29 NOTE — PROGRESS NOTES
Pt admitted to floor. Assisted to br x 1.tolerated. Pt to bed. Alert and oriented x 3. Cooperative with care. Pt with mild sob. C/o chest ache / discomfort. C/o burning to throat/heart burn. Lungs coarse. Instructed on dbc 10 x q1h wa. Abdomen large. bm yest. 1+ edema to ble. Refuses to wear gripper socks. Pt with own slippers. Son at bedside. Iv site to lt ac wnl. Instructed on iv site care. Pt with healing stage t2 to rt buttocks. Stage 3 to inner fold of buttocks. Skin tear to rt abdomen. mepilex in place. No ss of infection.bed alarm on.

## 2019-10-29 NOTE — ASSESSMENT & PLAN NOTE
-  -IV lasix 40 mg x 1 dose given in ED   -MPI stress test today  -Strict intake and output   -Na and fluid restriction   -Daily weights  Cardiology following

## 2019-10-29 NOTE — SUBJECTIVE & OBJECTIVE
Past Medical History:   Diagnosis Date    CHF (congestive heart failure)     CVA (cerebral vascular accident)     Depression     GERD (gastroesophageal reflux disease)     Hyperlipemia     Hypertension     Hypothyroidism        Past Surgical History:   Procedure Laterality Date    CHOLECYSTECTOMY      CORONARY ARTERY BYPASS GRAFT      KNEE SURGERY         Review of patient's allergies indicates:   Allergen Reactions    Lisinopril Blisters       Current Facility-Administered Medications on File Prior to Encounter   Medication    [COMPLETED] pneumoc 13-radha conj-dip cr(PF) (PREVNAR 13 (PF)) 0.5 mL    [DISCONTINUED] acetaminophen tablet 650 mg    [DISCONTINUED] aluminum-magnesium hydroxide-simethicone 200-200-20 mg/5 mL suspension 30 mL    [DISCONTINUED] amLODIPine tablet 5 mg    [DISCONTINUED] aspirin EC tablet 81 mg    [DISCONTINUED] atorvastatin tablet 40 mg    [DISCONTINUED] busPIRone tablet 15 mg    [DISCONTINUED] heparin (porcine) injection 5,000 Units    [DISCONTINUED] hydrALAZINE tablet 25 mg    [DISCONTINUED] HYDROcodone-acetaminophen 5-325 mg per tablet 1 tablet    [DISCONTINUED] levothyroxine tablet 25 mcg    [DISCONTINUED] ondansetron disintegrating tablet 8 mg    [DISCONTINUED] pantoprazole EC tablet 40 mg    [DISCONTINUED] traMADol tablet 50 mg     Current Outpatient Medications on File Prior to Encounter   Medication Sig    aluminum-magnesium hydroxide-simethicone (MAALOX) 200-200-20 mg/5 mL Susp Take 30 mLs by mouth every 6 (six) hours as needed.    aspirin (ECOTRIN) 81 MG EC tablet Take 81 mg by mouth once daily.    atorvastatin (LIPITOR) 40 MG tablet Take 40 mg by mouth nightly.    hydrALAZINE (APRESOLINE) 25 MG tablet Take 25 mg by mouth once daily.    levothyroxine (SYNTHROID) 25 MCG tablet Take 25 mcg by mouth once daily.    ondansetron (ZOFRAN-ODT) 4 MG TbDL     pantoprazole (PROTONIX) 40 MG tablet Take 1 tablet (40 mg total) by mouth once daily.    traMADol  (ULTRAM) 50 mg tablet Take 1 tablet (50 mg total) by mouth every 8 (eight) hours as needed for Pain.    amLODIPine (NORVASC) 5 MG tablet Take 1 tablet (5 mg total) by mouth once daily.    furosemide (LASIX) 20 MG tablet Take 20 mg by mouth daily as needed.    HYDROcodone-acetaminophen (NORCO) 5-325 mg per tablet Take 0.5 tablets by mouth as needed.     Family History     None        Tobacco Use    Smoking status: Former Smoker   Substance and Sexual Activity    Alcohol use: Never     Frequency: Never    Drug use: Not Currently    Sexual activity: Not on file     Review of Systems   Constitution: Positive for malaise/fatigue.   HENT: Negative for hearing loss and hoarse voice.    Eyes: Negative for blurred vision and visual disturbance.   Cardiovascular: Positive for chest pain (TTP) and dyspnea on exertion. Negative for claudication, irregular heartbeat, leg swelling, near-syncope, orthopnea, palpitations, paroxysmal nocturnal dyspnea and syncope.   Respiratory: Positive for shortness of breath. Negative for cough, hemoptysis, sleep disturbances due to breathing, snoring and wheezing.    Endocrine: Negative for cold intolerance and heat intolerance.   Hematologic/Lymphatic: Bruises/bleeds easily.   Skin: Negative for color change, dry skin and nail changes.   Musculoskeletal: Positive for arthritis and back pain. Negative for joint pain and myalgias.   Gastrointestinal: Positive for heartburn and nausea. Negative for bloating, abdominal pain, constipation and vomiting.   Genitourinary: Negative for dysuria, flank pain, hematuria and hesitancy.   Neurological: Positive for weakness. Negative for headaches, light-headedness, loss of balance, numbness and paresthesias.   Psychiatric/Behavioral: Negative for altered mental status.   Allergic/Immunologic: Negative for environmental allergies.     Objective:     Vital Signs (Most Recent):  Temp: 98 °F (36.7 °C) (10/29/19 0638)  Pulse: 64 (10/29/19 0801)  Resp: 20  (10/29/19 0638)  BP: 135/72 (10/29/19 0801)  SpO2: 97 % (10/29/19 0801) Vital Signs (24h Range):  Temp:  [97.4 °F (36.3 °C)-98 °F (36.7 °C)] 98 °F (36.7 °C)  Pulse:  [59-88] 64  Resp:  [16-20] 20  SpO2:  [93 %-98 %] 97 %  BP: (134-155)/(60-72) 135/72     Weight: 61.9 kg (136 lb 6.4 oz)  Body mass index is 24.95 kg/m².    SpO2: 97 %  O2 Device (Oxygen Therapy): room air      Intake/Output Summary (Last 24 hours) at 10/29/2019 0924  Last data filed at 10/29/2019 0726  Gross per 24 hour   Intake 50 ml   Output --   Net 50 ml       Lines/Drains/Airways     Peripheral Intravenous Line                 Peripheral IV - Single Lumen 10/29/19 0714 20 G Left Antecubital less than 1 day                Physical Exam   Constitutional: She is oriented to person, place, and time. She appears well-developed and well-nourished. No distress.   HENT:   Head: Normocephalic and atraumatic.   Eyes: Pupils are equal, round, and reactive to light.   Neck: Normal range of motion and full passive range of motion without pain. Neck supple. No JVD present.   Cardiovascular: Normal rate, regular rhythm, S1 normal, S2 normal and intact distal pulses. PMI is not displaced. Exam reveals no distant heart sounds.   No murmur heard.  Pulses:       Radial pulses are 2+ on the right side, and 2+ on the left side.        Dorsalis pedis pulses are 2+ on the right side, and 2+ on the left side.   Chest soreness TTP on exam today   Pulmonary/Chest: Effort normal. No accessory muscle usage. No respiratory distress. She has decreased breath sounds in the right lower field and the left lower field. She has no wheezes. She has no rales.   +NEGRON, chronic   Abdominal: Soft. Bowel sounds are normal. She exhibits no distension. There is no tenderness.   Musculoskeletal: Normal range of motion. She exhibits no edema.        Right ankle: She exhibits no swelling.        Left ankle: She exhibits no swelling.   Neurological: She is alert and oriented to person, place,  and time.   Skin: Skin is warm and dry. She is not diaphoretic. No cyanosis. Nails show no clubbing.   Psychiatric: She has a normal mood and affect. Her speech is normal and behavior is normal. Judgment and thought content normal. Cognition and memory are normal.   Nursing note and vitals reviewed.      Significant Labs:   BMP:   Recent Labs   Lab 10/28/19  0430 10/29/19  0714    113*    137   K 4.1 4.0    101   CO2 24 24   BUN 14 14   CREATININE 2.0* 2.0*   CALCIUM 9.4 9.7   MG 2.1  --    , CMP   Recent Labs   Lab 10/28/19  0430 10/29/19  0714    137   K 4.1 4.0    101   CO2 24 24    113*   BUN 14 14   CREATININE 2.0* 2.0*   CALCIUM 9.4 9.7   PROT  --  6.5   ALBUMIN  --  3.5   BILITOT  --  0.7   ALKPHOS  --  92   AST  --  23   ALT  --  14   ANIONGAP 11 12   ESTGFRAFRICA 25* 25*   EGFRNONAA 22* 22*   , CBC   Recent Labs   Lab 10/28/19  0430 10/29/19  0714   WBC 7.28 8.57   HGB 12.5 12.6   HCT 38.8 37.6   * 152   , Troponin   Recent Labs   Lab 10/29/19  0714   TROPONINI 0.055*    and All pertinent lab results from the last 24 hours have been reviewed.    Significant Imaging: Echocardiogram:   2D echo with color flow doppler:   Results for orders placed or performed during the hospital encounter of 10/25/19   2D echo with color flow doppler   Result Value Ref Range    QEF 55 55 - 65    Diastolic Dysfunction Yes (A)     Aortic Valve Stenosis MILD (A)     Est. PA Systolic Pressure 37.45     Mitral Valve Mobility MILDLY RESTRICTED     Tricuspid Valve Regurgitation MILD     Narrative    Date of Procedure: 10/27/2019        TEST DESCRIPTION   Technical Quality: This is a technically challenging study. There is poor endocardial definition.     Aorta: The aortic root is normal in size, measuring 2.0 cm at sinotubular junction and 2.4 cm at Sinuses of Valsalva. The proximal ascending aorta is normal in size, measuring 2.8 cm across.     Left Atrium: The left atrium is normal in  size, measuring 5.2 cm across in the parasternal view, and 6.4 cm across in the apical view.     Left Ventricle: The left ventricle is normal in size, with an end-diastolic diameter of 4.8 cm, and an end-systolic diameter of 3.0 cm. LV wall thickness is normal, with the septum measuring 0.9 cm and the posterior wall measuring 1.0 cm across. There   are no regional wall motion abnormalities. Left ventricular systolic function appears normal. Visually estimated ejection fraction is 55-60%. The LV Doppler derived stroke volume equals 63.0 ccs.     Diastolic indices: E wave velocity 0.8 m/s, E/A ratio 0.9,  msec., E/e' ratio(avg) 17. There is pseudonormalization of mitral inflow pattern consistent with significant diastolic dysfunction.     Right Atrium: The right atrium is normal in size, measuring 5.8 cm in length and 3.1 cm in width in the apical view.     Right Ventricle: The right ventricle is normal in size measuring 2.9 cm at the base in the apical right ventricle-focused view. Global right ventricular systolic function appears normal. The estimated PA systolic pressure is greater than 37 mmHg.     Aortic Valve:  The aortic valve is mildly sclerotic with normal leaflet mobility. The peak velocity obtained across the aortic valve is 1.84 m/s, which translates to a peak gradient of 14 mmHg. The mean gradient is 5 mmHg. Using a left ventricular   outflow tract diameter of 2.0 cm, a left ventricular outflow tract velocity time integral of 20 cm, and a peak instantaneous transvalvular velocity time integral of 37 cm, the calculated aortic valve area is 1.73 cm2, consistent with mild aortic   stenosis. There is aortic annular calcification.     Mitral Valve:  The mitral valve is mildly sclerotic with mildly restricted leaflet mobility. There is mitral annular calcification.     Tricuspid Valve:  The tricuspid valve is normal in structure with normal leaflet mobility. There is mild tricuspid regurgitation.      Pulmonary Valve:  The pulmonic valve is not well seen.     IVC: The IVC is not visualized.     Intracavitary: There is no evidence of pericardial effusion, intracavity mass, thrombi, or vegetation.         CONCLUSIONS     1 - Normal left ventricular systolic function (EF 55-60%).     2 - Impaired LV relaxation, elevated LAP (grade 2 diastolic dysfunction).     3 - Normal right ventricular systolic function .     4 - No wall motion abnormalities.     5 - The estimated PA systolic pressure is greater than 37 mmHg.     6 - Mild aortic stenosis, ARMANDO = 1.73 cm2, peak velocity = 1.84 m/s, mean gradient = 5 mmHg.     7 - The mitral valve is mildly sclerotic with mildly restricted leaflet mobility.     8 - Mild tricuspid regurgitation.             This document has been electronically    SIGNED BY: Anton Velazquez MD On: 10/27/2019 11:36    and X-Ray: CXR: X-Ray Chest 1 View (CXR): No results found for this visit on 10/29/19. and X-Ray Chest PA and Lateral (CXR): No results found for this visit on 10/29/19.

## 2019-10-29 NOTE — ASSESSMENT & PLAN NOTE
-, improved from recent hospital stay  -IV lasix 40 mg x 1 dose today in ED  -MPI stress test today  -Dash diet, 2 gm sodium restriction  -1500 ml fluid restriction  -Daily weights  -Strict intake and output  -Continue medical therapy

## 2019-10-29 NOTE — HPI
Danielle Cruz is a 88 year old female with PMHx of CAD s/p CABG (2005), Chronic diastolic CHF, HTN, HLP GERD, s/p cholecystectomy and Thyroid disease who presents to the Emergency Department for evaluation of mid sternal chest pain which onset this morning. Pt describes the pain as sharp initially then became dull. Associated sxs include nausea and metallic taste in mouth. She was discharged from this facility yesterday after being treated for CHF and acid reflux. In the ED, troponin was elevated to 0.55, , K+ 4.0, Cr 2.0. Cardiology consulted in the ED. MPI stress test planned for today. Code status was discussed with the patient and her son, Leland Cruz Jr. She is a full code and her son is his surrogate medical decision maker.

## 2019-10-30 VITALS
WEIGHT: 133.38 LBS | TEMPERATURE: 98 F | SYSTOLIC BLOOD PRESSURE: 153 MMHG | BODY MASS INDEX: 25.18 KG/M2 | OXYGEN SATURATION: 94 % | RESPIRATION RATE: 18 BRPM | DIASTOLIC BLOOD PRESSURE: 65 MMHG | HEART RATE: 83 BPM | HEIGHT: 61 IN

## 2019-10-30 PROBLEM — R07.9 CHEST PAIN: Status: RESOLVED | Noted: 2019-10-25 | Resolved: 2019-10-30

## 2019-10-30 PROBLEM — R13.12 OROPHARYNGEAL DYSPHAGIA: Status: ACTIVE | Noted: 2019-10-30

## 2019-10-30 PROBLEM — I50.33 ACUTE ON CHRONIC DIASTOLIC CONGESTIVE HEART FAILURE: Status: RESOLVED | Noted: 2019-10-26 | Resolved: 2019-10-30

## 2019-10-30 PROBLEM — R13.12 OROPHARYNGEAL DYSPHAGIA: Status: RESOLVED | Noted: 2019-10-30 | Resolved: 2019-10-30

## 2019-10-30 LAB
ANION GAP SERPL CALC-SCNC: 10 MMOL/L (ref 8–16)
BASOPHILS # BLD AUTO: 0.03 K/UL (ref 0–0.2)
BASOPHILS NFR BLD: 0.5 % (ref 0–1.9)
BUN SERPL-MCNC: 14 MG/DL (ref 8–23)
CALCIUM SERPL-MCNC: 9.1 MG/DL (ref 8.7–10.5)
CHLORIDE SERPL-SCNC: 100 MMOL/L (ref 95–110)
CO2 SERPL-SCNC: 29 MMOL/L (ref 23–29)
CREAT SERPL-MCNC: 2.2 MG/DL (ref 0.5–1.4)
DIFFERENTIAL METHOD: ABNORMAL
EOSINOPHIL # BLD AUTO: 0.2 K/UL (ref 0–0.5)
EOSINOPHIL NFR BLD: 2.7 % (ref 0–8)
ERYTHROCYTE [DISTWIDTH] IN BLOOD BY AUTOMATED COUNT: 13.1 % (ref 11.5–14.5)
EST. GFR  (AFRICAN AMERICAN): 22 ML/MIN/1.73 M^2
EST. GFR  (NON AFRICAN AMERICAN): 19 ML/MIN/1.73 M^2
GLUCOSE SERPL-MCNC: 90 MG/DL (ref 70–110)
HCT VFR BLD AUTO: 38.3 % (ref 37–48.5)
HGB BLD-MCNC: 12.1 G/DL (ref 12–16)
IMM GRANULOCYTES # BLD AUTO: 0.02 K/UL (ref 0–0.04)
IMM GRANULOCYTES NFR BLD AUTO: 0.3 % (ref 0–0.5)
LYMPHOCYTES # BLD AUTO: 2.2 K/UL (ref 1–4.8)
LYMPHOCYTES NFR BLD: 36.5 % (ref 18–48)
MCH RBC QN AUTO: 31.5 PG (ref 27–31)
MCHC RBC AUTO-ENTMCNC: 31.6 G/DL (ref 32–36)
MCV RBC AUTO: 100 FL (ref 82–98)
MONOCYTES # BLD AUTO: 0.8 K/UL (ref 0.3–1)
MONOCYTES NFR BLD: 13.4 % (ref 4–15)
NEUTROPHILS # BLD AUTO: 2.7 K/UL (ref 1.8–7.7)
NEUTROPHILS NFR BLD: 46.6 % (ref 38–73)
NRBC BLD-RTO: 0 /100 WBC
PLATELET # BLD AUTO: 130 K/UL (ref 150–350)
PMV BLD AUTO: 11.9 FL (ref 9.2–12.9)
POTASSIUM SERPL-SCNC: 4.4 MMOL/L (ref 3.5–5.1)
RBC # BLD AUTO: 3.84 M/UL (ref 4–5.4)
SODIUM SERPL-SCNC: 139 MMOL/L (ref 136–145)
WBC # BLD AUTO: 5.89 K/UL (ref 3.9–12.7)

## 2019-10-30 PROCEDURE — 99214 OFFICE O/P EST MOD 30 MIN: CPT | Mod: ,,, | Performed by: INTERNAL MEDICINE

## 2019-10-30 PROCEDURE — 85025 COMPLETE CBC W/AUTO DIFF WBC: CPT

## 2019-10-30 PROCEDURE — 99214 PR OFFICE/OUTPT VISIT, EST, LEVL IV, 30-39 MIN: ICD-10-PCS | Mod: ,,, | Performed by: INTERNAL MEDICINE

## 2019-10-30 PROCEDURE — 25000003 PHARM REV CODE 250: Performed by: NURSE PRACTITIONER

## 2019-10-30 PROCEDURE — 80048 BASIC METABOLIC PNL TOTAL CA: CPT

## 2019-10-30 PROCEDURE — G0378 HOSPITAL OBSERVATION PER HR: HCPCS

## 2019-10-30 PROCEDURE — 36415 COLL VENOUS BLD VENIPUNCTURE: CPT

## 2019-10-30 RX ORDER — SUCRALFATE 1 G/10ML
1 SUSPENSION ORAL
Qty: 1200 ML | Refills: 0 | Status: SHIPPED | OUTPATIENT
Start: 2019-10-30 | End: 2019-11-01

## 2019-10-30 RX ADMIN — LEVOTHYROXINE SODIUM 25 MCG: 25 TABLET ORAL at 05:10

## 2019-10-30 RX ADMIN — HYDRALAZINE HYDROCHLORIDE 25 MG: 25 TABLET, FILM COATED ORAL at 09:10

## 2019-10-30 RX ADMIN — ALUMINUM HYDROXIDE, MAGNESIUM HYDROXIDE, AND SIMETHICONE 30 ML: 200; 200; 20 SUSPENSION ORAL at 12:10

## 2019-10-30 RX ADMIN — PANTOPRAZOLE SODIUM 40 MG: 40 TABLET, DELAYED RELEASE ORAL at 09:10

## 2019-10-30 RX ADMIN — ASPIRIN 81 MG: 81 TABLET, COATED ORAL at 09:10

## 2019-10-30 RX ADMIN — ALUMINUM HYDROXIDE, MAGNESIUM HYDROXIDE, AND SIMETHICONE 30 ML: 200; 200; 20 SUSPENSION ORAL at 05:10

## 2019-10-30 RX ADMIN — AMLODIPINE BESYLATE 5 MG: 5 TABLET ORAL at 09:10

## 2019-10-30 NOTE — SUBJECTIVE & OBJECTIVE
Past Medical History:   Diagnosis Date    CHF (congestive heart failure)     CVA (cerebral vascular accident)     Depression     GERD (gastroesophageal reflux disease)     Hyperlipemia     Hypertension     Hypothyroidism        Past Surgical History:   Procedure Laterality Date    CHOLECYSTECTOMY      CORONARY ARTERY BYPASS GRAFT      KNEE SURGERY         Review of patient's allergies indicates:   Allergen Reactions    Lisinopril Blisters     Family History     None        Tobacco Use    Smoking status: Former Smoker   Substance and Sexual Activity    Alcohol use: Never     Frequency: Never    Drug use: Not Currently    Sexual activity: Not on file     Review of Systems   Constitutional: Negative for activity change and appetite change.   HENT: Negative for sore throat and trouble swallowing.    Eyes: Negative for pain and discharge.   Respiratory: Negative for cough and chest tightness.    Cardiovascular: Positive for chest pain. Negative for palpitations.   Gastrointestinal: Positive for nausea. Negative for abdominal pain, constipation, diarrhea and vomiting.   Genitourinary: Negative for dysuria, frequency and hematuria.   Skin: Negative for color change and rash.   Neurological: Negative for speech difficulty, weakness and headaches.   Psychiatric/Behavioral: Negative for confusion and sleep disturbance.     Objective:     Vital Signs (Most Recent):  Temp: 98.4 °F (36.9 °C) (10/30/19 1214)  Pulse: 79 (10/30/19 1214)  Resp: 18 (10/30/19 1214)  BP: (!) 153/65 (10/30/19 1214)  SpO2: (!) 94 % (10/30/19 1214) Vital Signs (24h Range):  Temp:  [96.8 °F (36 °C)-98.4 °F (36.9 °C)] 98.4 °F (36.9 °C)  Pulse:  [66-84] 79  Resp:  [15-22] 18  SpO2:  [94 %-97 %] 94 %  BP: (116-165)/(58-75) 153/65     Weight: 60.5 kg (133 lb 6.1 oz) (10/29/19 1740)  Body mass index is 25.2 kg/m².      Intake/Output Summary (Last 24 hours) at 10/30/2019 1310  Last data filed at 10/30/2019 1027  Gross per 24 hour   Intake 240 ml    Output 1100 ml   Net -860 ml       Lines/Drains/Airways     Peripheral Intravenous Line                 Peripheral IV - Single Lumen 10/29/19 0714 20 G Left Antecubital 1 day                Physical Exam   Constitutional: She is oriented to person, place, and time. She appears well-developed. No distress.   Cardiovascular: Normal rate, regular rhythm and normal heart sounds.   No murmur heard.  Pulmonary/Chest: Effort normal and breath sounds normal. No stridor. No respiratory distress.   Abdominal: Soft. Bowel sounds are normal. She exhibits no distension. There is no tenderness.   Neurological: She is alert and oriented to person, place, and time. No cranial nerve deficit.   Skin: Skin is warm and dry. No rash noted.   Psychiatric: She has a normal mood and affect. Her behavior is normal.       Significant Labs:  CBC:   Recent Labs   Lab 10/29/19  0714 10/30/19  0508   WBC 8.57 5.89   HGB 12.6 12.1   HCT 37.6 38.3    130*     CMP:   Recent Labs   Lab 10/29/19  0714 10/30/19  0508   * 90   CALCIUM 9.7 9.1   ALBUMIN 3.5  --    PROT 6.5  --     139   K 4.0 4.4   CO2 24 29    100   BUN 14 14   CREATININE 2.0* 2.2*   ALKPHOS 92  --    ALT 14  --    AST 23  --    BILITOT 0.7  --        Significant Imaging:  Imaging results within the past 24 hours have been reviewed.

## 2019-10-30 NOTE — PROGRESS NOTES
[facility] -   Cardiology  Progress Note    Patient Name: Danielle Cruz  MRN: [id number]  Admission Date: 10/29/2019  Hospital Length of Stay: 0 days  Code Status: Full Code   Attending Physician: Xavier Grove MD   Primary Care Physician: Jan Qureshi MD  Expected Discharge Date:   Principal Problem:Chest pain    Subjective:   HPI:  Danielle Cruz is a 88 year old female who presented to [facility] due to nausea and chest tightness which started while lying in bed last PM. She was discharged from this facility yesterday. Her current medical conditions include CAD s/p CABG (2005), Chronic diastolic CHF, HTN, HLP GERD, s/p cholecystectomy, Thyroid disease. ED workup revealed Troponin 0.55, , K+ 4.0, Cr 2.0. Cardiology consulted to assist with medical management. Chart reviewed, patient seen and examined. Denies chest pain on exam today. She continues to endorse soreness to chest wall with TTP today on exam. Given her CAD and CABG history will obtain MPI stress test today and optimize medical regimen as BP allows. Further recs to follow.     Hospital Course:   10/30/19-Patient seen and examined today, resting in bed. Complains of some sternal tenderness to palpation and GERD like discomfort. MPI stress test negative. Labs reviewed. Creatinine 2.2.      Review of Systems   Constitution: Positive for malaise/fatigue.   HENT: Negative.    Eyes: Negative.    Cardiovascular: Positive for chest pain (atypical).   Respiratory: Negative.    Endocrine: Negative.    Hematologic/Lymphatic: Negative.    Skin: Negative.    Musculoskeletal: Positive for arthritis and back pain.   Gastrointestinal: Positive for heartburn.   Genitourinary: Negative.    Neurological: Negative.    Psychiatric/Behavioral: Negative.    Allergic/Immunologic: Negative.      Objective:     Vital Signs (Most Recent):  Temp: 98.4 °F (36.9 °C) (10/30/19 1214)  Pulse: 79 (10/30/19 1214)  Resp: 18 (10/30/19 1214)  BP:  (!) 153/65 (10/30/19 1214)  SpO2: (!) 94 % (10/30/19 1214) Vital Signs (24h Range):  Temp:  [96.8 °F (36 °C)-98.4 °F (36.9 °C)] 98.4 °F (36.9 °C)  Pulse:  [66-84] 79  Resp:  [15-22] 18  SpO2:  [94 %-97 %] 94 %  BP: (116-165)/(58-75) 153/65     Weight: 60.5 kg (133 lb 6.1 oz)  Body mass index is 25.2 kg/m².     SpO2: (!) 94 %  O2 Device (Oxygen Therapy): room air      Intake/Output Summary (Last 24 hours) at 10/30/2019 1224  Last data filed at 10/30/2019 1027  Gross per 24 hour   Intake 240 ml   Output 1100 ml   Net -860 ml       Lines/Drains/Airways     Peripheral Intravenous Line                 Peripheral IV - Single Lumen 10/29/19 0714 20 G Left Antecubital 1 day                Physical Exam   Constitutional: She is oriented to person, place, and time. She appears well-developed and well-nourished. No distress.   HENT:   Head: Normocephalic and atraumatic.   Eyes: Pupils are equal, round, and reactive to light. Right eye exhibits no discharge. Left eye exhibits no discharge.   Neck: Neck supple. No JVD present.   Cardiovascular: Normal rate, regular rhythm, S1 normal, S2 normal and normal heart sounds.   No murmur heard.  Pulmonary/Chest: Effort normal and breath sounds normal. No respiratory distress. She has no wheezes. She has no rales.   CABG scar well-healed   Abdominal: Soft. She exhibits no distension. There is no tenderness.   Musculoskeletal: She exhibits no edema.   Neurological: She is alert and oriented to person, place, and time.   Skin: Skin is warm and dry. She is not diaphoretic. No erythema.   Psychiatric: She has a normal mood and affect. Her behavior is normal. Thought content normal.   Nursing note and vitals reviewed.      Significant Labs:   CMP   Recent Labs   Lab 10/29/19  0714 10/30/19  0508    139   K 4.0 4.4    100   CO2 24 29   * 90   BUN 14 14   CREATININE 2.0* 2.2*   CALCIUM 9.7 9.1   PROT 6.5  --    ALBUMIN 3.5  --    BILITOT 0.7  --    ALKPHOS 92  --    AST 23   --    ALT 14  --    ANIONGAP 12 10   ESTGFRAFRICA 25* 22*   EGFRNONAA 22* 19*   , CBC   Recent Labs   Lab 10/29/19  0714 10/30/19  0508   WBC 8.57 5.89   HGB 12.6 12.1   HCT 37.6 38.3    130*   , Troponin   Recent Labs   Lab 10/29/19  0714 10/29/19  0946 10/29/19  1628   TROPONINI 0.055* 0.044* 0.044*    and All pertinent lab results from the last 24 hours have been reviewed.    Significant Imaging: Echocardiogram:   2D echo with color flow doppler:   Results for orders placed or performed during the hospital encounter of 10/25/19   2D echo with color flow doppler   Result Value Ref Range    QEF 55 55 - 65    Diastolic Dysfunction Yes (A)     Aortic Valve Stenosis MILD (A)     Est. PA Systolic Pressure 37.45     Mitral Valve Mobility MILDLY RESTRICTED     Tricuspid Valve Regurgitation MILD     Narrative    Date of Procedure: 10/27/2019        TEST DESCRIPTION   Technical Quality: This is a technically challenging study. There is poor endocardial definition.     Aorta: The aortic root is normal in size, measuring 2.0 cm at sinotubular junction and 2.4 cm at Sinuses of Valsalva. The proximal ascending aorta is normal in size, measuring 2.8 cm across.     Left Atrium: The left atrium is normal in size, measuring 5.2 cm across in the parasternal view, and 6.4 cm across in the apical view.     Left Ventricle: The left ventricle is normal in size, with an end-diastolic diameter of 4.8 cm, and an end-systolic diameter of 3.0 cm. LV wall thickness is normal, with the septum measuring 0.9 cm and the posterior wall measuring 1.0 cm across. There   are no regional wall motion abnormalities. Left ventricular systolic function appears normal. Visually estimated ejection fraction is 55-60%. The LV Doppler derived stroke volume equals 63.0 ccs.     Diastolic indices: E wave velocity 0.8 m/s, E/A ratio 0.9,  msec., E/e' ratio(avg) 17. There is pseudonormalization of mitral inflow pattern consistent with significant  diastolic dysfunction.     Right Atrium: The right atrium is normal in size, measuring 5.8 cm in length and 3.1 cm in width in the apical view.     Right Ventricle: The right ventricle is normal in size measuring 2.9 cm at the base in the apical right ventricle-focused view. Global right ventricular systolic function appears normal. The estimated PA systolic pressure is greater than 37 mmHg.     Aortic Valve:  The aortic valve is mildly sclerotic with normal leaflet mobility. The peak velocity obtained across the aortic valve is 1.84 m/s, which translates to a peak gradient of 14 mmHg. The mean gradient is 5 mmHg. Using a left ventricular   outflow tract diameter of 2.0 cm, a left ventricular outflow tract velocity time integral of 20 cm, and a peak instantaneous transvalvular velocity time integral of 37 cm, the calculated aortic valve area is 1.73 cm2, consistent with mild aortic   stenosis. There is aortic annular calcification.     Mitral Valve:  The mitral valve is mildly sclerotic with mildly restricted leaflet mobility. There is mitral annular calcification.     Tricuspid Valve:  The tricuspid valve is normal in structure with normal leaflet mobility. There is mild tricuspid regurgitation.     Pulmonary Valve:  The pulmonic valve is not well seen.     IVC: The IVC is not visualized.     Intracavitary: There is no evidence of pericardial effusion, intracavity mass, thrombi, or vegetation.         CONCLUSIONS     1 - Normal left ventricular systolic function (EF 55-60%).     2 - Impaired LV relaxation, elevated LAP (grade 2 diastolic dysfunction).     3 - Normal right ventricular systolic function .     4 - No wall motion abnormalities.     5 - The estimated PA systolic pressure is greater than 37 mmHg.     6 - Mild aortic stenosis, ARMANDO = 1.73 cm2, peak velocity = 1.84 m/s, mean gradient = 5 mmHg.     7 - The mitral valve is mildly sclerotic with mildly restricted leaflet mobility.     8 - Mild tricuspid  regurgitation.             This document has been electronically    SIGNED BY: Anton Velazquez MD On: 10/27/2019 11:36   , EKG: REviewed and X-Ray: CXR: X-Ray Chest 1 View (CXR): No results found for this visit on 10/29/19. and X-Ray Chest PA and Lateral (CXR): No results found for this visit on 10/29/19.    Assessment and Plan:   Patient who presents with atypical chest pain and elevated troponin. MPI stress test negative. Continue same cardiac meds. GI to see patient today. Follow-up in clinic.    * Chest pain with elevated troponin  -Troponin 0.055  -MPI stress test today  -Continue current medical management  -Continue ASA, Statin, Norvasc, Hydralazine  -BB on hold given bradycardia  -Further recs to follow stress test    10/30/19  -Troponin trending down and flat  -MPI stress test negative  -Chest pain atypical and seems more MSK/GERD in nature  -GI to see patient today  -Continue ASA, amlodipine, statin  -BB held due to bradycardia    Acute on chronic diastolic congestive heart failure  -, improved from recent hospital stay  -IV lasix 40 mg x 1 dose today in ED  -MPI stress test today  -Dash diet, 2 gm sodium restriction  -1500 ml fluid restriction  -Daily weights  -Strict intake and output  -Continue medical therapy    10/30/19  -Improved since admission  -Continue amlodipine  -ARB held due to bumped creatinine  -Low salt diet, 1.5 L fluid restriction    Hyperlipemia  -Continue statin    GERD (gastroesophageal reflux disease)  -Mgmt as per hospital medicine  -GI to see today    Hypertension  -On medical therapy  Continue Norvasc, Hydralazine    Hypothyroidism  -continue synthroid        VTE Risk Mitigation (From admission, onward)         Ordered     Place sequential compression device  Until discontinued      10/29/19 1015     IP VTE HIGH RISK PATIENT  Once      10/29/19 1015                Emily Mohan PA-C  Cardiology  [facility]

## 2019-10-30 NOTE — PROGRESS NOTES
Discharge instructions given to pt. Verbalized understanding. Iv removed by paget,rn. Tele removed. No distress noted. Gi saw pt pt as appt upon discharge. Pt home with family via private vehicle.

## 2019-10-30 NOTE — ASSESSMENT & PLAN NOTE
-Troponin 0.055  -MPI stress test today  -Continue current medical management  -Continue ASA, Statin, Norvasc, Hydralazine  -BB on hold given bradycardia  -Further recs to follow stress test    10/30/19  -Troponin trending down and flat  -MPI stress test negative  -Chest pain atypical and seems more MSK/GERD in nature  -GI to see patient today  -Continue ASA, amlodipine, statin  -BB held due to bradycardia

## 2019-10-30 NOTE — CHAPLAIN
Initial visit with patient and family.  Provided ministries of listening, presence, and prayer.  Pt currently had no other spiritual needs and I will follow up as needed.    Chaplain Camron Farmer M.Div., [facility]

## 2019-10-30 NOTE — ASSESSMENT & PLAN NOTE
-, improved from recent hospital stay  -IV lasix 40 mg x 1 dose today in ED  -MPI stress test today  -Dash diet, 2 gm sodium restriction  -1500 ml fluid restriction  -Daily weights  -Strict intake and output  -Continue medical therapy    10/30/19  -Improved since admission  -Continue amlodipine  -ARB held due to bumped creatinine  -Low salt diet, 1.5 L fluid restriction

## 2019-10-30 NOTE — PLAN OF CARE
Dx chest pain. Reflux  Poc instructed on dbc 10 x q1wa. Instructed on turnign q2h. Maalox ac and hs. Protonix qd. Troponin trending down. Pt had stress test. Pt with stage 3 to coccyx. Healing stage 2 to rt buttocks. Skin tear to rt abdomen and redness to rt heel. Wound care consult in . Mepilex to coccyx. Iv heplock. Accurate I&0. Oriented to room and call light.family at bedside

## 2019-10-30 NOTE — CONSULTS
[facility] -   Gastroenterology  Consult Note    Patient Name: Danielle Cruz  MRN: [id number]  Admission Date: 10/29/2019  Hospital Length of Stay: 0 days  Code Status: Full Code   Attending Provider: Xavier Grove MD   Consulting Provider: Maggie Yoder NP  Primary Care Physician: Jan Qureshi MD  Principal Problem:Chest pain    Inpatient consult to Gastroenterology  Consult performed by: Maggie Yoder NP  Consult ordered by: Aida Escalera NP        Subjective:     HPI:  Patient is an 88 year old female who presented to the ED yesterday for chest pain and nausea. She was discharged from this facility on Monday for similar symptoms. She reports having a long history of GERD but symptoms worsened after her cholecystectomy done in August 2019.  She reports burning chest pain, nausea, and acid taste in her mouth. No episodes of vomiting. She is only bernardino to eat bland foods. She started Pepcid in August with little results. She was started on pantoprazole 4 days ago. She is also getting Mylanta which helps but only temporarily. She has what seems to be some associated oropharyngeal dysphagia. This occurs with solids. She has never had a GI workup.     Past Medical History:   Diagnosis Date    CHF (congestive heart failure)     CVA (cerebral vascular accident)     Depression     GERD (gastroesophageal reflux disease)     Hyperlipemia     Hypertension     Hypothyroidism        Past Surgical History:   Procedure Laterality Date    CHOLECYSTECTOMY      CORONARY ARTERY BYPASS GRAFT      KNEE SURGERY         Review of patient's allergies indicates:   Allergen Reactions    Lisinopril Blisters     Family History     None        Tobacco Use    Smoking status: Former Smoker   Substance and Sexual Activity    Alcohol use: Never     Frequency: Never    Drug use: Not Currently    Sexual activity: Not on file     Review of Systems   Constitutional: Negative for activity  change and appetite change.   HENT: Negative for sore throat and trouble swallowing.    Eyes: Negative for pain and discharge.   Respiratory: Negative for cough and chest tightness.    Cardiovascular: Positive for chest pain. Negative for palpitations.   Gastrointestinal: Positive for nausea. Negative for abdominal pain, constipation, diarrhea and vomiting.   Genitourinary: Negative for dysuria, frequency and hematuria.   Skin: Negative for color change and rash.   Neurological: Negative for speech difficulty, weakness and headaches.   Psychiatric/Behavioral: Negative for confusion and sleep disturbance.     Objective:     Vital Signs (Most Recent):  Temp: 98.4 °F (36.9 °C) (10/30/19 1214)  Pulse: 79 (10/30/19 1214)  Resp: 18 (10/30/19 1214)  BP: (!) 153/65 (10/30/19 1214)  SpO2: (!) 94 % (10/30/19 1214) Vital Signs (24h Range):  Temp:  [96.8 °F (36 °C)-98.4 °F (36.9 °C)] 98.4 °F (36.9 °C)  Pulse:  [66-84] 79  Resp:  [15-22] 18  SpO2:  [94 %-97 %] 94 %  BP: (116-165)/(58-75) 153/65     Weight: 60.5 kg (133 lb 6.1 oz) (10/29/19 1740)  Body mass index is 25.2 kg/m².      Intake/Output Summary (Last 24 hours) at 10/30/2019 1310  Last data filed at 10/30/2019 1027  Gross per 24 hour   Intake 240 ml   Output 1100 ml   Net -860 ml       Lines/Drains/Airways     Peripheral Intravenous Line                 Peripheral IV - Single Lumen 10/29/19 0714 20 G Left Antecubital 1 day                Physical Exam   Constitutional: She is oriented to person, place, and time. She appears well-developed. No distress.   Cardiovascular: Normal rate, regular rhythm and normal heart sounds.   No murmur heard.  Pulmonary/Chest: Effort normal and breath sounds normal. No stridor. No respiratory distress.   Abdominal: Soft. Bowel sounds are normal. She exhibits no distension. There is no tenderness.   Neurological: She is alert and oriented to person, place, and time. No cranial nerve deficit.   Skin: Skin is warm and dry. No rash noted.    Psychiatric: She has a normal mood and affect. Her behavior is normal.       Significant Labs:  CBC:   Recent Labs   Lab 10/29/19  0714 10/30/19  0508   WBC 8.57 5.89   HGB 12.6 12.1   HCT 37.6 38.3    130*     CMP:   Recent Labs   Lab 10/29/19  0714 10/30/19  0508   * 90   CALCIUM 9.7 9.1   ALBUMIN 3.5  --    PROT 6.5  --     139   K 4.0 4.4   CO2 24 29    100   BUN 14 14   CREATININE 2.0* 2.2*   ALKPHOS 92  --    ALT 14  --    AST 23  --    BILITOT 0.7  --        Significant Imaging:  Imaging results within the past 24 hours have been reviewed.    Assessment/Plan:     Oropharyngeal dysphagia  Recommend esophagram and modified barium swallow study. This can be done as outpatient.     GERD (gastroesophageal reflux disease)  Patient with long standing GERD that worsened in August after cholecystectomy   - just started PPI 4 days ago. Continue pantoprazole 40 mg daily. Can add Carafate AC & HS.   - will need outpatient GI follow up        Thank you for your consult. I will sign off. Please contact us if you have any additional questions.    Maggie Yoder NP  Gastroenterology  [facility]

## 2019-10-30 NOTE — HOSPITAL COURSE
10/30/19-Patient seen and examined today, resting in bed. Complains of some sternal tenderness to palpation and GERD like discomfort. MPI stress test negative. Labs reviewed. Creatinine 2.2.

## 2019-10-30 NOTE — SUBJECTIVE & OBJECTIVE
Review of Systems   Constitution: Positive for malaise/fatigue.   HENT: Negative.    Eyes: Negative.    Cardiovascular: Positive for chest pain (atypical).   Respiratory: Negative.    Endocrine: Negative.    Hematologic/Lymphatic: Negative.    Skin: Negative.    Musculoskeletal: Positive for arthritis and back pain.   Gastrointestinal: Positive for heartburn.   Genitourinary: Negative.    Neurological: Negative.    Psychiatric/Behavioral: Negative.    Allergic/Immunologic: Negative.      Objective:     Vital Signs (Most Recent):  Temp: 98.4 °F (36.9 °C) (10/30/19 1214)  Pulse: 79 (10/30/19 1214)  Resp: 18 (10/30/19 1214)  BP: (!) 153/65 (10/30/19 1214)  SpO2: (!) 94 % (10/30/19 1214) Vital Signs (24h Range):  Temp:  [96.8 °F (36 °C)-98.4 °F (36.9 °C)] 98.4 °F (36.9 °C)  Pulse:  [66-84] 79  Resp:  [15-22] 18  SpO2:  [94 %-97 %] 94 %  BP: (116-165)/(58-75) 153/65     Weight: 60.5 kg (133 lb 6.1 oz)  Body mass index is 25.2 kg/m².     SpO2: (!) 94 %  O2 Device (Oxygen Therapy): room air      Intake/Output Summary (Last 24 hours) at 10/30/2019 1224  Last data filed at 10/30/2019 1027  Gross per 24 hour   Intake 240 ml   Output 1100 ml   Net -860 ml       Lines/Drains/Airways     Peripheral Intravenous Line                 Peripheral IV - Single Lumen 10/29/19 0714 20 G Left Antecubital 1 day                Physical Exam   Constitutional: She is oriented to person, place, and time. She appears well-developed and well-nourished. No distress.   HENT:   Head: Normocephalic and atraumatic.   Eyes: Pupils are equal, round, and reactive to light. Right eye exhibits no discharge. Left eye exhibits no discharge.   Neck: Neck supple. No JVD present.   Cardiovascular: Normal rate, regular rhythm, S1 normal, S2 normal and normal heart sounds.   No murmur heard.  Pulmonary/Chest: Effort normal and breath sounds normal. No respiratory distress. She has no wheezes. She has no rales.   CABG scar well-healed   Abdominal: Soft. She  exhibits no distension. There is no tenderness.   Musculoskeletal: She exhibits no edema.   Neurological: She is alert and oriented to person, place, and time.   Skin: Skin is warm and dry. She is not diaphoretic. No erythema.   Psychiatric: She has a normal mood and affect. Her behavior is normal. Thought content normal.   Nursing note and vitals reviewed.      Significant Labs:   CMP   Recent Labs   Lab 10/29/19  0714 10/30/19  0508    139   K 4.0 4.4    100   CO2 24 29   * 90   BUN 14 14   CREATININE 2.0* 2.2*   CALCIUM 9.7 9.1   PROT 6.5  --    ALBUMIN 3.5  --    BILITOT 0.7  --    ALKPHOS 92  --    AST 23  --    ALT 14  --    ANIONGAP 12 10   ESTGFRAFRICA 25* 22*   EGFRNONAA 22* 19*   , CBC   Recent Labs   Lab 10/29/19  0714 10/30/19  0508   WBC 8.57 5.89   HGB 12.6 12.1   HCT 37.6 38.3    130*   , Troponin   Recent Labs   Lab 10/29/19  0714 10/29/19  0946 10/29/19  1628   TROPONINI 0.055* 0.044* 0.044*    and All pertinent lab results from the last 24 hours have been reviewed.    Significant Imaging: Echocardiogram:   2D echo with color flow doppler:   Results for orders placed or performed during the hospital encounter of 10/25/19   2D echo with color flow doppler   Result Value Ref Range    QEF 55 55 - 65    Diastolic Dysfunction Yes (A)     Aortic Valve Stenosis MILD (A)     Est. PA Systolic Pressure 37.45     Mitral Valve Mobility MILDLY RESTRICTED     Tricuspid Valve Regurgitation MILD     Narrative    Date of Procedure: 10/27/2019        TEST DESCRIPTION   Technical Quality: This is a technically challenging study. There is poor endocardial definition.     Aorta: The aortic root is normal in size, measuring 2.0 cm at sinotubular junction and 2.4 cm at Sinuses of Valsalva. The proximal ascending aorta is normal in size, measuring 2.8 cm across.     Left Atrium: The left atrium is normal in size, measuring 5.2 cm across in the parasternal view, and 6.4 cm across in the apical  view.     Left Ventricle: The left ventricle is normal in size, with an end-diastolic diameter of 4.8 cm, and an end-systolic diameter of 3.0 cm. LV wall thickness is normal, with the septum measuring 0.9 cm and the posterior wall measuring 1.0 cm across. There   are no regional wall motion abnormalities. Left ventricular systolic function appears normal. Visually estimated ejection fraction is 55-60%. The LV Doppler derived stroke volume equals 63.0 ccs.     Diastolic indices: E wave velocity 0.8 m/s, E/A ratio 0.9,  msec., E/e' ratio(avg) 17. There is pseudonormalization of mitral inflow pattern consistent with significant diastolic dysfunction.     Right Atrium: The right atrium is normal in size, measuring 5.8 cm in length and 3.1 cm in width in the apical view.     Right Ventricle: The right ventricle is normal in size measuring 2.9 cm at the base in the apical right ventricle-focused view. Global right ventricular systolic function appears normal. The estimated PA systolic pressure is greater than 37 mmHg.     Aortic Valve:  The aortic valve is mildly sclerotic with normal leaflet mobility. The peak velocity obtained across the aortic valve is 1.84 m/s, which translates to a peak gradient of 14 mmHg. The mean gradient is 5 mmHg. Using a left ventricular   outflow tract diameter of 2.0 cm, a left ventricular outflow tract velocity time integral of 20 cm, and a peak instantaneous transvalvular velocity time integral of 37 cm, the calculated aortic valve area is 1.73 cm2, consistent with mild aortic   stenosis. There is aortic annular calcification.     Mitral Valve:  The mitral valve is mildly sclerotic with mildly restricted leaflet mobility. There is mitral annular calcification.     Tricuspid Valve:  The tricuspid valve is normal in structure with normal leaflet mobility. There is mild tricuspid regurgitation.     Pulmonary Valve:  The pulmonic valve is not well seen.     IVC: The IVC is not visualized.      Intracavitary: There is no evidence of pericardial effusion, intracavity mass, thrombi, or vegetation.         CONCLUSIONS     1 - Normal left ventricular systolic function (EF 55-60%).     2 - Impaired LV relaxation, elevated LAP (grade 2 diastolic dysfunction).     3 - Normal right ventricular systolic function .     4 - No wall motion abnormalities.     5 - The estimated PA systolic pressure is greater than 37 mmHg.     6 - Mild aortic stenosis, ARMANDO = 1.73 cm2, peak velocity = 1.84 m/s, mean gradient = 5 mmHg.     7 - The mitral valve is mildly sclerotic with mildly restricted leaflet mobility.     8 - Mild tricuspid regurgitation.             This document has been electronically    SIGNED BY: Anton Velazquez MD On: 10/27/2019 11:36   , EKG: REviewed and X-Ray: CXR: X-Ray Chest 1 View (CXR): No results found for this visit on 10/29/19. and X-Ray Chest PA and Lateral (CXR): No results found for this visit on 10/29/19.

## 2019-10-30 NOTE — CONSULTS
10/30/19 1500   Skin   Skin WDL ex   Domingo Risk Assessment   Sensory Perception 3-->slightly limited   Moisture 3-->occasionally moist   Activity 3-->walks occasionally   Mobility 3-->slightly limited   Nutrition 3-->adequate   Friction and Shear 2-->potential problem   Domingo Score 17        Wound 10/29/19 1452 Abrasion(s) lower Abdomen   Date First Assessed/Time First Assessed: 10/29/19 1452   Primary Wound Type: Abrasion(s)  Side: Right  Orientation: lower  Location: Abdomen   Wound WDL ex   Dressing Appearance Moist drainage   Drainage Amount Scant   Drainage Characteristics/Odor Serosanguineous   Appearance Red   Red (%), Wound Tissue Color 100 %   Dressing Foam        Pressure Injury 10/29/19 1452 midline Sacral spine Stage 3   Date First Assessed/Time First Assessed: 10/29/19 1452   Pressure Injury Present on Admission: yes  Orientation: midline  Location: Sacral spine  Staging: Stage 3   Staging Stage 3   Dressing Appearance Moist drainage   Drainage Amount Scant   Drainage Characteristics/Odor Serous   Appearance Yellow;Tan   Tissue loss description Full thickness   Yellow (%), Wound Tissue Color 100 %   Periwound Area   (chronic discoloration)   Wound Edges Open   Wound Length (cm) 0.5 cm   Wound Width (cm) 0.5 cm   Wound Depth (cm) 0.2 cm   Wound Volume (cm^3) 0.05 cm^3   Wound Surface Area (cm^2) 0.25 cm^2   Care Cleansed with:;Sterile normal saline   Dressing Hydrocolloid  (mesalt)   Periwound Care Skin barrier film applied   Dressing Change Due 11/04/19     Consulted to this 88 year old female today for skin breakdown to her sacrum and abdomen. She has a PMHx of CAD s/p CABG (2005), Chronic diastolic CHF, HTN, HLP GERD, s/p cholecystectomy and Thyroid disease, currently inpatient with chest pain. She is awake and alert. Skin assessment performed. Bilateral heels intact with no redness noted. Patient has a skin tear to her right middle abdomen measuring about 1x1cm, moist red wound bed. Foam  dressing left in place. Patient turned to right side with moderate assist. Stage 3 pressure injury noted to her sacrum. Wound bed is moist and completely obscured with yellow/tan slough. Some chronic, dark discoloration noted to sacrum and bilateral buttocks. Cleansed with saline and patted dry. Periwound painted with cavilon. Mesalt applied to cover wound bed then a duoderm was placed to cover. Patient is discharging home today. See below for wound care recommendations.     Abdominal skin tear:  1. Cleanse with saline  2. Pat dry  3. Apply foam dressing  4. Change every 3-5 days    Stage 3 to sacrum:  1. Cleanse with saline  2. Pat dry  3. Paint periwound with cavilon  4. Apply mesalt to wound bed  5. Cover with a hydrocolloid dressing  6. Change every 5 days

## 2019-10-30 NOTE — PROGRESS NOTES
Pt with bruise to lt inner arm. Concerned. Site soft. Not hard. No warmth. Instructed to monitor and it should be reabsorbed. Instructed pt that she Is on asa.

## 2019-10-30 NOTE — HPI
Patient is an 88 year old female who presented to the ED yesterday for chest pain and nausea. She was discharged from this facility on Monday for similar symptoms. She reports having a long history of GERD but symptoms worsened after her cholecystectomy done in August 2019.  She reports burning chest pain, nausea, and acid taste in her mouth. No episodes of vomiting. She is only bernardino to eat bland foods. She started Pepcid in August with little results. She was started on pantoprazole 4 days ago. She is also getting Mylanta which helps but only temporarily. She has what seems to be some associated oropharyngeal dysphagia. This occurs with solids. She has never had a GI workup.

## 2019-10-30 NOTE — ASSESSMENT & PLAN NOTE
Patient with long standing GERD that worsened in August after cholecystectomy   - just started PPI 4 days ago. Continue pantoprazole 40 mg daily. Can add Carafate AC & HS.   - will need outpatient GI follow up

## 2019-10-31 ENCOUNTER — TELEPHONE (OUTPATIENT)
Dept: GASTROENTEROLOGY | Facility: CLINIC | Age: 84
End: 2019-10-31

## 2019-10-31 NOTE — TELEPHONE ENCOUNTER
Returned call to pt's daughter. Left message. Phone message states carafate not covered by insurance. Please advise.

## 2019-10-31 NOTE — TELEPHONE ENCOUNTER
----- Message from Cain Cook sent at 10/31/2019  9:20 AM CDT -----  Contact: Pt's Mahesh Sebastian [phone]  Pt's Daughter Mahesh Sebastian would like to be called back asap regarding medication sucralfate (CARAFATE) 100 mg/mL suspension was denied by Insurance co.  Daughter is requesting another medication.    Daughter can be reached at [phone].    Thanks

## 2019-11-01 ENCOUNTER — TELEPHONE (OUTPATIENT)
Dept: GASTROENTEROLOGY | Facility: CLINIC | Age: 84
End: 2019-11-01

## 2019-11-01 RX ORDER — SUCRALFATE 1 G/1
1 TABLET ORAL
Qty: 120 TABLET | Refills: 0 | Status: SHIPPED | OUTPATIENT
Start: 2019-11-01 | End: 2019-11-04

## 2019-11-01 NOTE — TELEPHONE ENCOUNTER
----- Message from Elizabeth Ritchie sent at 11/1/2019  8:37 AM CDT -----  Contact: pt daughter  Type:  Patient Returning Call    Who Called:pt daughter  Who Left Message for Patient: nurse  Does the patient know what this is regarding?:pt med   Would the patient rather a call back or a response via [facility]? call  Best Call Back Number: [phone] (home)   Additional Information:

## 2019-11-04 ENCOUNTER — OFFICE VISIT (OUTPATIENT)
Dept: GASTROENTEROLOGY | Facility: CLINIC | Age: 84
End: 2019-11-04
Payer: MEDICARE

## 2019-11-04 VITALS
DIASTOLIC BLOOD PRESSURE: 64 MMHG | WEIGHT: 133 LBS | OXYGEN SATURATION: 98 % | BODY MASS INDEX: 25.11 KG/M2 | HEART RATE: 88 BPM | HEIGHT: 61 IN | SYSTOLIC BLOOD PRESSURE: 126 MMHG

## 2019-11-04 DIAGNOSIS — R11.0 NAUSEA: ICD-10-CM

## 2019-11-04 DIAGNOSIS — K21.9 GASTROESOPHAGEAL REFLUX DISEASE, ESOPHAGITIS PRESENCE NOT SPECIFIED: ICD-10-CM

## 2019-11-04 DIAGNOSIS — R13.14 PHARYNGOESOPHAGEAL DYSPHAGIA: Primary | ICD-10-CM

## 2019-11-04 PROCEDURE — 99214 OFFICE O/P EST MOD 30 MIN: CPT | Mod: S$GLB,,, | Performed by: NURSE PRACTITIONER

## 2019-11-04 PROCEDURE — 1101F PT FALLS ASSESS-DOCD LE1/YR: CPT | Mod: CPTII,S$GLB,, | Performed by: NURSE PRACTITIONER

## 2019-11-04 PROCEDURE — 99999 PR PBB SHADOW E&M-EST. PATIENT-LVL III: ICD-10-PCS | Mod: PBBFAC,,, | Performed by: NURSE PRACTITIONER

## 2019-11-04 PROCEDURE — 1101F PR PT FALLS ASSESS DOC 0-1 FALLS W/OUT INJ PAST YR: ICD-10-PCS | Mod: CPTII,S$GLB,, | Performed by: NURSE PRACTITIONER

## 2019-11-04 PROCEDURE — 99214 PR OFFICE/OUTPT VISIT, EST, LEVL IV, 30-39 MIN: ICD-10-PCS | Mod: S$GLB,,, | Performed by: NURSE PRACTITIONER

## 2019-11-04 PROCEDURE — 99999 PR PBB SHADOW E&M-EST. PATIENT-LVL III: CPT | Mod: PBBFAC,,, | Performed by: NURSE PRACTITIONER

## 2019-11-04 RX ORDER — PANTOPRAZOLE SODIUM 40 MG/1
40 TABLET, DELAYED RELEASE ORAL 2 TIMES DAILY
Qty: 60 TABLET | Refills: 2 | Status: SHIPPED | OUTPATIENT
Start: 2019-11-04 | End: 2020-11-03

## 2019-11-04 RX ORDER — FAMOTIDINE 20 MG/1
20 TABLET, FILM COATED ORAL 2 TIMES DAILY
Qty: 60 TABLET | Refills: 5 | Status: SHIPPED | OUTPATIENT
Start: 2019-11-04 | End: 2020-11-03

## 2019-11-04 NOTE — HOSPITAL COURSE
Pt presented with chest pain and NM stress test was normal. Pt has chest pain and burning and her daughter says that it has worsened since having her gallbladder removed. Pt was given PPI. Gastroenterology saw the patient and recommended GI follow up, esophagram and modified barium swallow as an outpatient. They recommended pantoprazole 40 mg daily and Carafate with meals. Reflux precautions were discussed. Pt was seen and examined and determined to be safe and stable for discharge. She was advised to follow up with both PCP and Gastroenterology.

## 2019-11-04 NOTE — PROGRESS NOTES
Clinic Follow Up:  [facility] Follow Up Note    Reason for Follow Up:  The primary encounter diagnosis was Pharyngoesophageal dysphagia. Diagnoses of Gastroesophageal reflux disease, esophagitis presence not specified and Nausea were also pertinent to this visit.    PCP: Jan Qureshi   No address on file    HPI:  This is a 88 y.o. female here for follow up from recent hospital stay. She is accompanied by her daughter today. She reports feeling some improvement since starting pantoprazole which was started 10 days ago. She has not had any side effects from medication. She was discharged from the hospital with the addition of Carafate. Patient was unable to take Carafate as it caused her headaches. Her nausea was a significant complaint while in the hospital and has improved since discharge. She continues with belching but describes much less chest pain. She does report continued sore throat and dysphagia. She has trouble with swallowing solid and semisolids. She does ok with ensure or boost shakes or liquids.     Review of Systems   Constitutional: Negative for activity change and appetite change.        As per interval history above   HENT: Positive for sore throat.    Respiratory: Negative for cough and shortness of breath.    Cardiovascular: Negative for chest pain.   Gastrointestinal: Negative for abdominal pain, blood in stool, constipation, diarrhea, nausea and vomiting.        Dysphagia    Skin: Negative for color change and rash.       Medical History:  Past Medical History:   Diagnosis Date    CHF (congestive heart failure)     CVA (cerebral vascular accident)     Depression     GERD (gastroesophageal reflux disease)     Hyperlipemia     Hypertension     Hypothyroidism        Surgical History:   Past Surgical History:   Procedure Laterality Date    CHOLECYSTECTOMY      CORONARY ARTERY BYPASS GRAFT      KNEE SURGERY         Family History:   No family history on  "file.    Social History:   Social History     Tobacco Use    Smoking status: Former Smoker    Smokeless tobacco: Never Used   Substance Use Topics    Alcohol use: Never     Frequency: Never    Drug use: Not Currently       Allergies:   Review of patient's allergies indicates:   Allergen Reactions    Lisinopril Blisters       Home Medications:  Current Outpatient Medications on File Prior to Visit   Medication Sig Dispense Refill    aluminum-magnesium hydroxide-simethicone (MAALOX) 200-200-20 mg/5 mL Susp Take 30 mLs by mouth every 6 (six) hours as needed.      amLODIPine (NORVASC) 5 MG tablet Take 1 tablet (5 mg total) by mouth once daily. 30 tablet 0    aspirin (ECOTRIN) 81 MG EC tablet Take 81 mg by mouth once daily.      atorvastatin (LIPITOR) 40 MG tablet Take 40 mg by mouth nightly.      furosemide (LASIX) 20 MG tablet Take 20 mg by mouth daily as needed.      hydrALAZINE (APRESOLINE) 25 MG tablet Take 25 mg by mouth once daily.      levothyroxine (SYNTHROID) 25 MCG tablet Take 25 mcg by mouth once daily.      ondansetron (ZOFRAN-ODT) 4 MG TbDL       traMADol (ULTRAM) 50 mg tablet Take 1 tablet (50 mg total) by mouth every 8 (eight) hours as needed for Pain. 12 tablet 0     No current facility-administered medications on file prior to visit.        /64   Pulse 88   Ht 5' 1" (1.549 m)   Wt 60.3 kg (133 lb)   SpO2 98%   BMI 25.13 kg/m²   Body mass index is 25.13 kg/m².  Physical Exam   Constitutional: She is oriented to person, place, and time and well-developed, well-nourished, and in no distress. No distress.   HENT:   Head: Normocephalic.   Eyes: Pupils are equal, round, and reactive to light. Conjunctivae are normal.   Cardiovascular: Normal rate, regular rhythm and normal heart sounds.   Pulmonary/Chest: Effort normal and breath sounds normal. No respiratory distress.   Abdominal: Soft. Bowel sounds are normal. She exhibits no distension. There is no tenderness.   Neurological: She " is alert and oriented to person, place, and time. No cranial nerve deficit.   Skin: Skin is warm and dry. No rash noted.   Psychiatric: Mood and affect normal.       Labs: Pertinent labs reviewed.    Assessment:  1. Pharyngoesophageal dysphagia    2. Gastroesophageal reflux disease, esophagitis presence not specified    3. Nausea        Recommendations:  Pharyngoesophageal dysphagia  - check esophagram and MBSS  -     FL Esophagram Complete; Future; Expected date: 11/04/2019  -     Fl Modified Barium Swallow Speech; Future; Expected date: 11/04/2019  -     SLP video swallow; Future    Gastroesophageal reflux disease, esophagitis presence not specified  - will increase pantoprazole to twice a day for 8 weeks then instructed to decrease dose to daily. Will also add Pepcid 20 mg BID.   - she should follow up in GI clinic in 8 weeks. She is aware she will need to see one of my colleagues as I will be out on leave.   -     FL Esophagram Complete; Future; Expected date: 11/04/2019  -     Fl Modified Barium Swallow Speech; Future; Expected date: 11/04/2019  -     SLP video swallow; Future    Nausea  - improved since discharge  - as above.   -     FL Esophagram Complete; Future; Expected date: 11/04/2019  -     Fl Modified Barium Swallow Speech; Future; Expected date: 11/04/2019  -     SLP video swallow; Future    Other orders  -     famotidine (PEPCID) 20 MG tablet; Take 1 tablet (20 mg total) by mouth 2 (two) times daily.  Dispense: 60 tablet; Refill: 5  -     pantoprazole (PROTONIX) 40 MG tablet; Take 1 tablet (40 mg total) by mouth 2 (two) times daily.  Dispense: 60 tablet; Refill: 2    Return to Clinic:  Follow up in about 8 weeks (around 12/30/2019).    Thank you for the opportunity to participate in the care of this patient.  LUTHER Kauffman

## 2019-11-04 NOTE — PATIENT INSTRUCTIONS
Instructions:   - increase pantoprazole (Protonix) to 40 mg twice a day.   - add Pepcid 20 mg twice a day.

## 2019-11-04 NOTE — DISCHARGE SUMMARY
[facility] Hospital Medicine  Discharge Summary      Patient Name: Danielle Cruz  MRN: [id number]  Admission Date: 10/29/2019  Hospital Length of Stay: 0 days  Discharge Date and Time: 10/30/2019  4:02 PM  Attending Physician: Xavier Grove MD  Discharging Provider: Aida Escalera NP  Primary Care Provider: Jan Qureshi MD      HPI:   Danielle Cruz is a 88 year old female with PMHx of CAD s/p CABG (2005), Chronic diastolic CHF, HTN, HLP GERD, s/p cholecystectomy and Thyroid disease who presents to the Emergency Department for evaluation of mid sternal chest pain which onset this morning. Pt describes the pain as sharp initially then became dull. Associated sxs include nausea and metallic taste in mouth. She was discharged from this facility yesterday after being treated for CHF and acid reflux. In the ED, troponin was elevated to 0.55, , K+ 4.0, Cr 2.0. Cardiology consulted in the ED. MPI stress test planned for today. Code status was discussed with the patient and her son, Leland Cruz Jr. She is a full code and her son is his surrogate medical decision maker.          * No surgery found *      Hospital Course:   Pt presented with chest pain and NM stress test was normal. Pt has chest pain and burning and her daughter says that it has worsened since having her gallbladder removed. Pt was given PPI. Gastroenterology saw the patient and recommended GI follow up, esophagram and modified barium swallow as an outpatient. They recommended pantoprazole 40 mg daily and Carafate with meals. Reflux precautions were discussed. Pt was seen and examined and determined to be safe and stable for discharge. She was advised to follow up with both PCP and Gastroenterology.      Consults:   Consults (From admission, onward)        Status Ordering Provider     Inpatient consult to Cardiology  Once     Provider:  Anton Velazquez MD    Completed RONNIE CUETO JR     Inpatient consult to  Gastroenterology  Once     Provider:  Pam Meier MD    Completed VIVIAN HUGHES          No new Assessment & Plan notes have been filed under this hospital service since the last note was generated.  Service: Hospital Medicine    Final Active Diagnoses:    Diagnosis Date Noted POA    Stage 3 chronic kidney disease [N18.3] 10/29/2019 Yes    Hypothyroidism [E03.9]  Yes    Hypertension [I10]  Yes    Hyperlipemia [E78.5]  Yes    GERD (gastroesophageal reflux disease) [K21.9]  Yes      Problems Resolved During this Admission:    Diagnosis Date Noted Date Resolved POA    PRINCIPAL PROBLEM:  Chest pain with elevated troponin [R07.9] 10/25/2019 10/30/2019 Yes    Oropharyngeal dysphagia [R13.12] 10/30/2019 10/30/2019 Unknown    Acute on chronic diastolic congestive heart failure [I50.33] 10/26/2019 10/30/2019 Yes       Discharged Condition: stable    Disposition: Home or Self Care    Follow Up:  Follow-up Information     Jan Qureshi MD In 3 days.    Specialty:  Family Medicine  Why:  Hospital Follow Up - Chest pain and GERD  Contact information:  [address] [facility]  [place] [address]  [phone]             Pam Meier MD In 2 weeks.    Specialties:  Gastroenterology, Internal Medicine  Why:  Hospital Follow Up - GERD, chronic nausea  Contact information:  [id number] [place] [address] [address] [place] [address]  [phone]                 Patient Instructions:      Notify your health care provider if you experience any of the following:  temperature >100.4     Notify your health care provider if you experience any of the following:  persistent nausea and vomiting or diarrhea     Notify your health care provider if you experience any of the following:  difficulty breathing or increased cough     Activity as tolerated       Significant Diagnostic Studies: Labs: CMP No results for input(s): NA, K, CL, CO2, GLU, BUN, CREATININE, CALCIUM, PROT, ALBUMIN, BILITOT, ALKPHOS, AST, ALT, ANIONGAP,  ESTGFRAFRICA, EGFRNONAA in the last 48 hours. and CBC No results for input(s): WBC, HGB, HCT, PLT in the last 48 hours.    Pending Diagnostic Studies:     None         Medications:  Reconciled Home Medications:      Medication List      CONTINUE taking these medications    aluminum-magnesium hydroxide-simethicone 200-200-20 mg/5 mL Susp  Commonly known as:  MAALOX  Take 30 mLs by mouth every 6 (six) hours as needed.     amLODIPine 5 MG tablet  Commonly known as:  NORVASC  Take 1 tablet (5 mg total) by mouth once daily.     aspirin 81 MG EC tablet  Commonly known as:  ECOTRIN  Take 81 mg by mouth once daily.     atorvastatin 40 MG tablet  Commonly known as:  LIPITOR  Take 40 mg by mouth nightly.     furosemide 20 MG tablet  Commonly known as:  LASIX  Take 20 mg by mouth daily as needed.     hydrALAZINE 25 MG tablet  Commonly known as:  APRESOLINE  Take 25 mg by mouth once daily.     levothyroxine 25 MCG tablet  Commonly known as:  SYNTHROID  Take 25 mcg by mouth once daily.     ondansetron 4 MG Tbdl  Commonly known as:  ZOFRAN-ODT     traMADol 50 mg tablet  Commonly known as:  ULTRAM  Take 1 tablet (50 mg total) by mouth every 8 (eight) hours as needed for Pain.        STOP taking these medications    HYDROcodone-acetaminophen 5-325 mg per tablet  Commonly known as:  NORCO     pantoprazole 40 MG tablet  Commonly known as:  PROTONIX            Indwelling Lines/Drains at time of discharge:   Lines/Drains/Airways     Pressure Ulcer                 Pressure Injury 10/29/19 1452 midline Sacral spine Stage 3 6 days                Time spent on the discharge of patient: > 30 minutes  Patient was seen and examined on the date of discharge and determined to be suitable for discharge.         Aida Escalera NP  Department of Hospital Medicine  [facility]

## 2019-11-05 ENCOUNTER — OFFICE VISIT (OUTPATIENT)
Dept: CARDIOLOGY | Facility: CLINIC | Age: 84
End: 2019-11-05
Payer: MEDICARE

## 2019-11-05 ENCOUNTER — TELEPHONE (OUTPATIENT)
Dept: GASTROENTEROLOGY | Facility: CLINIC | Age: 84
End: 2019-11-05

## 2019-11-05 VITALS
BODY MASS INDEX: 25.13 KG/M2 | DIASTOLIC BLOOD PRESSURE: 60 MMHG | WEIGHT: 133 LBS | SYSTOLIC BLOOD PRESSURE: 130 MMHG | HEART RATE: 72 BPM

## 2019-11-05 DIAGNOSIS — E78.49 OTHER HYPERLIPIDEMIA: ICD-10-CM

## 2019-11-05 DIAGNOSIS — N18.30 STAGE 3 CHRONIC KIDNEY DISEASE: ICD-10-CM

## 2019-11-05 DIAGNOSIS — I50.32 CHRONIC HEART FAILURE WITH PRESERVED EJECTION FRACTION: ICD-10-CM

## 2019-11-05 DIAGNOSIS — I10 ESSENTIAL HYPERTENSION: ICD-10-CM

## 2019-11-05 DIAGNOSIS — Z95.1 HX OF CABG: ICD-10-CM

## 2019-11-05 DIAGNOSIS — R00.1 BRADYCARDIA: ICD-10-CM

## 2019-11-05 DIAGNOSIS — I25.118 CORONARY ARTERY DISEASE OF NATIVE ARTERY OF NATIVE HEART WITH STABLE ANGINA PECTORIS: Primary | ICD-10-CM

## 2019-11-05 PROCEDURE — 99999 PR PBB SHADOW E&M-EST. PATIENT-LVL III: CPT | Mod: PBBFAC,,, | Performed by: INTERNAL MEDICINE

## 2019-11-05 PROCEDURE — 1101F PT FALLS ASSESS-DOCD LE1/YR: CPT | Mod: CPTII,S$GLB,, | Performed by: INTERNAL MEDICINE

## 2019-11-05 PROCEDURE — 1101F PR PT FALLS ASSESS DOC 0-1 FALLS W/OUT INJ PAST YR: ICD-10-PCS | Mod: CPTII,S$GLB,, | Performed by: INTERNAL MEDICINE

## 2019-11-05 PROCEDURE — 99999 PR PBB SHADOW E&M-EST. PATIENT-LVL III: ICD-10-PCS | Mod: PBBFAC,,, | Performed by: INTERNAL MEDICINE

## 2019-11-05 PROCEDURE — 99214 PR OFFICE/OUTPT VISIT, EST, LEVL IV, 30-39 MIN: ICD-10-PCS | Mod: S$GLB,,, | Performed by: INTERNAL MEDICINE

## 2019-11-05 PROCEDURE — 99214 OFFICE O/P EST MOD 30 MIN: CPT | Mod: S$GLB,,, | Performed by: INTERNAL MEDICINE

## 2019-11-05 RX ORDER — AMLODIPINE BESYLATE 5 MG/1
5 TABLET ORAL DAILY
Qty: 30 TABLET | Refills: 3 | Status: SHIPPED | OUTPATIENT
Start: 2019-11-05 | End: 2020-11-04

## 2019-11-05 RX ORDER — ATORVASTATIN CALCIUM 40 MG/1
40 TABLET, FILM COATED ORAL NIGHTLY
Qty: 60 TABLET | Refills: 1 | Status: SHIPPED | OUTPATIENT
Start: 2019-11-05

## 2019-11-05 RX ORDER — FUROSEMIDE 40 MG/1
40 TABLET ORAL DAILY PRN
Qty: 60 TABLET | Refills: 3 | Status: SHIPPED | OUTPATIENT
Start: 2019-11-05

## 2019-11-05 NOTE — PROGRESS NOTES
Subjective:   Patient ID:  Danielle Cruz is a 88 y.o. female who presents for cardiac consult of Shortness of Breath (hospital f/u )      HPI  The patient came in today for cardiac consult of Shortness of Breath (hospital f/u )      Danielle Cruz is a 88 y.o. female pt with  CAD s/p CABG (2005), Chronic diastolic CHF, HTN, HLP GERD, s/p cholecystectomy, Thyroid disease here for CV follow up.     Hosp f/u   88 year old female who presented to [facility] due to nausea and chest tightness which started while lying in bed last PM. She was discharged from this facility yesterday. Her current medical conditions include CAD s/p CABG (2005), Chronic diastolic CHF, HTN, HLP GERD, s/p cholecystectomy, Thyroid disease. ED workup revealed Troponin 0.55, , K+ 4.0, Cr 2.0. Cardiology consulted to assist with medical management. Chart reviewed, patient seen and examined. Denies chest pain on exam today. She continues to endorse soreness to chest wall with TTP today on exam. Given her CAD and CABG history will obtain MPI stress test today and optimize medical regimen as BP allows.    10/30/19-Patient seen and examined today, resting in bed. Complains of some sternal tenderness to palpation and GERD like discomfort. MPI stress test negative. Labs reviewed. Creatinine 2.2.    11/5/19  She feels better but still decreased PO intake and nausea. Her appetite is decreased and still low s/p cholecystectomy. GI will get a swallow study.      Patient feels  no sob, no leg swelling, no PND, no palpitation, no dizziness, no syncope, no CNS symptoms.    Patient has fairly good exercise tolerance.    Patient is compliant with medications.      Nuclear Quantitative Functional Analysis:   LVEF: >= 70 %  LVED Volume: 59 ml  LVES Volume: 11 ml    Impression: NORMAL MYOCARDIAL PERFUSION  1. The perfusion scan is free of evidence for myocardial ischemia or injury.   2. Resting wall motion is physiologic.   3. Resting LV  function is normal.   4. There is moderate stress induced LV cavity dilatation.  5. The extracardiac distribution of radioactivity is normal.   6. There was no previous study available to compare. TID is 1.53    This document has been electronically    SIGNED BY: Anton Velazquez MD On: 10/29/2019 16:27    Past Medical History:   Diagnosis Date    CHF (congestive heart failure)     Coronary artery disease of native artery of native heart with stable angina pectoris 11/5/2019    CVA (cerebral vascular accident)     Depression     GERD (gastroesophageal reflux disease)     Hyperlipemia     Hypertension     Hypothyroidism        Past Surgical History:   Procedure Laterality Date    CHOLECYSTECTOMY      CORONARY ARTERY BYPASS GRAFT      KNEE SURGERY         Social History     Tobacco Use    Smoking status: Former Smoker    Smokeless tobacco: Never Used   Substance Use Topics    Alcohol use: Never     Frequency: Never    Drug use: Not Currently       History reviewed. No pertinent family history.    Patient's Medications   New Prescriptions    No medications on file   Previous Medications    ALUMINUM-MAGNESIUM HYDROXIDE-SIMETHICONE (MAALOX) 200-200-20 MG/5 ML SUSP    Take 30 mLs by mouth every 6 (six) hours as needed.    ASPIRIN (ECOTRIN) 81 MG EC TABLET    Take 81 mg by mouth once daily.    FAMOTIDINE (PEPCID) 20 MG TABLET    Take 1 tablet (20 mg total) by mouth 2 (two) times daily.    HYDRALAZINE (APRESOLINE) 25 MG TABLET    Take 25 mg by mouth once daily.    LEVOTHYROXINE (SYNTHROID) 25 MCG TABLET    Take 25 mcg by mouth once daily.    ONDANSETRON (ZOFRAN-ODT) 4 MG TBDL        PANTOPRAZOLE (PROTONIX) 40 MG TABLET    Take 1 tablet (40 mg total) by mouth 2 (two) times daily.    TRAMADOL (ULTRAM) 50 MG TABLET    Take 1 tablet (50 mg total) by mouth every 8 (eight) hours as needed for Pain.   Modified Medications    Modified Medication Previous Medication    AMLODIPINE (NORVASC) 5 MG TABLET amLODIPine (NORVASC)  5 MG tablet       Take 1 tablet (5 mg total) by mouth once daily.    Take 1 tablet (5 mg total) by mouth once daily.    ATORVASTATIN (LIPITOR) 40 MG TABLET atorvastatin (LIPITOR) 40 MG tablet       Take 1 tablet (40 mg total) by mouth nightly.    Take 40 mg by mouth nightly.    FUROSEMIDE (LASIX) 40 MG TABLET furosemide (LASIX) 20 MG tablet       Take 1 tablet (40 mg total) by mouth daily as needed (For leg swelling and weight gain 3 lbs).    Take 20 mg by mouth daily as needed.   Discontinued Medications    No medications on file       Review of Systems   Constitutional: Negative.    HENT: Negative.    Eyes: Negative.    Respiratory: Negative.    Cardiovascular: Positive for chest pain.   Gastrointestinal: Positive for heartburn.   Genitourinary: Negative.    Musculoskeletal: Negative.    Skin: Negative.    Neurological: Negative.    Endo/Heme/Allergies: Negative.    Psychiatric/Behavioral: Negative.    All 12 systems otherwise negative.      Wt Readings from Last 3 Encounters:   11/05/19 60.3 kg (133 lb)   11/04/19 60.3 kg (133 lb)   10/29/19 60.5 kg (133 lb 6.1 oz)     Temp Readings from Last 3 Encounters:   10/30/19 98.4 °F (36.9 °C) (Oral)   10/28/19 97.4 °F (36.3 °C) (Oral)     BP Readings from Last 3 Encounters:   11/05/19 130/60   11/04/19 126/64   10/30/19 (!) 153/65     Pulse Readings from Last 3 Encounters:   11/05/19 72   11/04/19 88   10/30/19 83       /60 (BP Location: Left arm, Patient Position: Sitting, BP Method: Small (Manual))   Pulse 72   Wt 60.3 kg (133 lb)   BMI 25.13 kg/m²     Objective:   Physical Exam   Constitutional: She is oriented to person, place, and time. She appears well-developed and well-nourished. No distress.   HENT:   Head: Normocephalic and atraumatic.   Nose: Nose normal.   Mouth/Throat: Oropharynx is clear and moist.   Eyes: Conjunctivae and EOM are normal. No scleral icterus.   Neck: Normal range of motion. Neck supple. No JVD present. No thyromegaly present.    Cardiovascular: Normal rate, regular rhythm, S1 normal and S2 normal. Exam reveals no gallop, no S3, no S4 and no friction rub.   Murmur heard.  Pulmonary/Chest: Effort normal and breath sounds normal. No stridor. No respiratory distress. She has no wheezes. She has no rales. She exhibits no tenderness.   Abdominal: Soft. Bowel sounds are normal. She exhibits no distension and no mass. There is no tenderness. There is no rebound.   Genitourinary:   Genitourinary Comments: Deferred   Musculoskeletal: Normal range of motion. She exhibits no edema, tenderness or deformity.   Lymphadenopathy:     She has no cervical adenopathy.   Neurological: She is alert and oriented to person, place, and time. She exhibits normal muscle tone. Coordination normal.   Skin: Skin is warm and dry. No rash noted. She is not diaphoretic. No erythema. No pallor.   Psychiatric: She has a normal mood and affect. Her behavior is normal. Judgment and thought content normal.   Nursing note and vitals reviewed.      Lab Results   Component Value Date     10/30/2019    K 4.4 10/30/2019     10/30/2019    CO2 29 10/30/2019    BUN 14 10/30/2019    CREATININE 2.2 (H) 10/30/2019    GLU 90 10/30/2019    MG 2.1 10/28/2019    AST 23 10/29/2019    ALT 14 10/29/2019    ALBUMIN 3.5 10/29/2019    PROT 6.5 10/29/2019    BILITOT 0.7 10/29/2019    WBC 5.89 10/30/2019    HGB 12.1 10/30/2019    HCT 38.3 10/30/2019     (H) 10/30/2019     (L) 10/30/2019    TSH 0.866 10/25/2019     (H) 10/29/2019     Assessment:      1. Coronary artery disease of native artery of native heart with stable angina pectoris    2. Essential hypertension    3. Other hyperlipidemia    4. Bradycardia    5. Stage 3 chronic kidney disease    6. Hx of CABG    7. Chronic heart failure with preserved ejection fraction        Plan:   1. CAD s/p CABG  - recent stress neg  - cont meds  - atypical CP likely sec to GI/GERD    2. HTN  - cont meds    3. HLD  - cont  statin    4. Bradycardia  - resolved     5. CKD3  - cont to monitor    6. HFpEF  - low salt diet  - euvolemic  - lasix PRN 40mg  - daily weights    Thank you for allowing me to participate in this patient's care. Please do not hesitate to contact me with any questions or concerns. Consult note has been forwarded to the referral physician.

## 2019-11-05 NOTE — TELEPHONE ENCOUNTER
----- Message from Jessica Kurtz sent at 11/5/2019  9:02 AM CST -----  Contact: Patient's daughter- Mahesh Delgado  Please have Joseph give the patient's daughter a call concerning scheduling the patient's additional test.  [phone]

## 2019-11-05 NOTE — TELEPHONE ENCOUNTER
Returned call to pt's daughter, Mahesh and gave appt date/time and instructions for radiology tests.

## 2019-11-05 NOTE — TELEPHONE ENCOUNTER
----- Message from Mera Knutson sent at 11/5/2019 10:15 AM CST -----  Contact: Mahesh/daughter  Type:  Patient Returning Call    Who Called: Mahesh  Who Left Message for Patient: Joseph  Does the patient know what this is regarding?: She didn't say  Would the patient rather a call back or a response via [facility]? Call back   Best Call Back Number: Please call her at [phone]  Additional Information: n/a

## 2019-11-07 ENCOUNTER — TELEPHONE (OUTPATIENT)
Dept: GASTROENTEROLOGY | Facility: CLINIC | Age: 84
End: 2019-11-07

## 2019-11-07 ENCOUNTER — HOSPITAL ENCOUNTER (OUTPATIENT)
Dept: RADIOLOGY | Facility: HOSPITAL | Age: 84
Discharge: HOME | End: 2019-11-07
Attending: NURSE PRACTITIONER
Payer: MEDICARE

## 2019-11-07 DIAGNOSIS — R13.14 PHARYNGOESOPHAGEAL DYSPHAGIA: ICD-10-CM

## 2019-11-07 DIAGNOSIS — R11.0 NAUSEA: ICD-10-CM

## 2019-11-07 DIAGNOSIS — K21.9 GASTROESOPHAGEAL REFLUX DISEASE, ESOPHAGITIS PRESENCE NOT SPECIFIED: ICD-10-CM

## 2019-11-07 PROCEDURE — 74220 X-RAY XM ESOPHAGUS 1CNTRST: CPT | Mod: TC

## 2019-11-07 PROCEDURE — A9698 NON-RAD CONTRAST MATERIALNOC: HCPCS | Performed by: NURSE PRACTITIONER

## 2019-11-07 PROCEDURE — 25500020 PHARM REV CODE 255: Performed by: NURSE PRACTITIONER

## 2019-11-07 RX ADMIN — BARIUM SULFATE 80 ML: 980 POWDER, FOR SUSPENSION ORAL at 09:11

## 2019-11-07 NOTE — TELEPHONE ENCOUNTER
----- Message from Supriya Mckeon sent at 11/7/2019  2:39 PM CST -----  Contact: pt  Type:  Test Results    Who Called: Gene/daughter  Name of Test (Lab/Mammo/Etc): Swallow Test  Date of Test: 11-7  Ordering Provider:Beba  Where the test was performed: [facility]  Would the patient rather a call back or a response via [facility]?call back  Best Call Back Number:[phone]  Additional Information:  na

## 2019-11-11 ENCOUNTER — TELEPHONE (OUTPATIENT)
Dept: GASTROENTEROLOGY | Facility: CLINIC | Age: 84
End: 2019-11-11

## 2019-11-11 ENCOUNTER — OFFICE VISIT (OUTPATIENT)
Dept: GASTROENTEROLOGY | Facility: CLINIC | Age: 84
End: 2019-11-11
Payer: MEDICARE

## 2019-11-11 VITALS
DIASTOLIC BLOOD PRESSURE: 60 MMHG | WEIGHT: 134 LBS | SYSTOLIC BLOOD PRESSURE: 138 MMHG | HEIGHT: 61 IN | OXYGEN SATURATION: 97 % | HEART RATE: 78 BPM | BODY MASS INDEX: 25.3 KG/M2

## 2019-11-11 DIAGNOSIS — R93.3 ABNORMAL ESOPHAGRAM: Primary | ICD-10-CM

## 2019-11-11 DIAGNOSIS — R13.19 ESOPHAGEAL DYSPHAGIA: ICD-10-CM

## 2019-11-11 PROCEDURE — 99999 PR PBB SHADOW E&M-EST. PATIENT-LVL III: CPT | Mod: PBBFAC,,, | Performed by: NURSE PRACTITIONER

## 2019-11-11 PROCEDURE — 99999 PR PBB SHADOW E&M-EST. PATIENT-LVL III: ICD-10-PCS | Mod: PBBFAC,,, | Performed by: NURSE PRACTITIONER

## 2019-11-11 PROCEDURE — 99213 OFFICE O/P EST LOW 20 MIN: CPT | Mod: S$GLB,,, | Performed by: NURSE PRACTITIONER

## 2019-11-11 PROCEDURE — 1101F PR PT FALLS ASSESS DOC 0-1 FALLS W/OUT INJ PAST YR: ICD-10-PCS | Mod: CPTII,S$GLB,, | Performed by: NURSE PRACTITIONER

## 2019-11-11 PROCEDURE — 1101F PT FALLS ASSESS-DOCD LE1/YR: CPT | Mod: CPTII,S$GLB,, | Performed by: NURSE PRACTITIONER

## 2019-11-11 PROCEDURE — 99213 PR OFFICE/OUTPT VISIT, EST, LEVL III, 20-29 MIN: ICD-10-PCS | Mod: S$GLB,,, | Performed by: NURSE PRACTITIONER

## 2019-11-11 NOTE — TELEPHONE ENCOUNTER
----- Message from Moira Abraham sent at 11/11/2019  8:09 AM CST -----  Contact: Daughter-Mahesh  Daughter is requesting call back in regards to insurance denying medication.          [facility] call back at [phone]

## 2019-11-12 NOTE — PROGRESS NOTES
"Clinic Follow Up:  [facility] Follow Up Note    Reason for Follow Up:  The primary encounter diagnosis was Abnormal esophagram. A diagnosis of Esophageal dysphagia was also pertinent to this visit.    PCP: Jan Qureshi       HPI:  This is a 88 y.o. female here for follow up of the above. Symptoms were improving at last visit. Since that time esophagram was performed which showed circumferential narrowing of the mid esophagus. Associated with mild upstream dilation. EGD recommended and patient and family agree. Patient has become much more symptomatic since last visit. She is now unable to swallow anything other than liquids. She is very upset in visit about her condition and states that she is "unable to go on like this".     Review of Systems   Constitutional: Negative for activity change and appetite change.        As per interval history above   Respiratory: Negative for cough and shortness of breath.    Cardiovascular: Negative for chest pain.   Gastrointestinal: Negative for abdominal pain, anal bleeding, blood in stool, constipation, diarrhea, nausea and vomiting.        Dysphagia    Skin: Negative for color change and rash.       Medical History:  Past Medical History:   Diagnosis Date    CHF (congestive heart failure)     Coronary artery disease of native artery of native heart with stable angina pectoris 11/5/2019    CVA (cerebral vascular accident)     Depression     GERD (gastroesophageal reflux disease)     Hyperlipemia     Hypertension     Hypothyroidism        Surgical History:   Past Surgical History:   Procedure Laterality Date    CHOLECYSTECTOMY      CORONARY ARTERY BYPASS GRAFT      KNEE SURGERY         Family History:   No family history on file.    Social History:   Social History     Tobacco Use    Smoking status: Former Smoker    Smokeless tobacco: Never Used   Substance Use Topics    Alcohol use: Never     Frequency: Never    Drug use: Not Currently " "      Allergies:   Review of patient's allergies indicates:   Allergen Reactions    Lisinopril Blisters       Home Medications:  Current Outpatient Medications on File Prior to Visit   Medication Sig Dispense Refill    aluminum-magnesium hydroxide-simethicone (MAALOX) 200-200-20 mg/5 mL Susp Take 30 mLs by mouth every 6 (six) hours as needed.      amLODIPine (NORVASC) 5 MG tablet Take 1 tablet (5 mg total) by mouth once daily. 30 tablet 3    aspirin (ECOTRIN) 81 MG EC tablet Take 81 mg by mouth once daily.      atorvastatin (LIPITOR) 40 MG tablet Take 1 tablet (40 mg total) by mouth nightly. 60 tablet 1    famotidine (PEPCID) 20 MG tablet Take 1 tablet (20 mg total) by mouth 2 (two) times daily. 60 tablet 5    furosemide (LASIX) 40 MG tablet Take 1 tablet (40 mg total) by mouth daily as needed (For leg swelling and weight gain 3 lbs). 60 tablet 3    hydrALAZINE (APRESOLINE) 25 MG tablet Take 25 mg by mouth once daily.      levothyroxine (SYNTHROID) 25 MCG tablet Take 25 mcg by mouth once daily.      ondansetron (ZOFRAN-ODT) 4 MG TbDL       pantoprazole (PROTONIX) 40 MG tablet Take 1 tablet (40 mg total) by mouth 2 (two) times daily. 60 tablet 2    traMADol (ULTRAM) 50 mg tablet Take 1 tablet (50 mg total) by mouth every 8 (eight) hours as needed for Pain. 12 tablet 0     No current facility-administered medications on file prior to visit.        /60   Pulse 78   Ht 5' 1" (1.549 m)   Wt 60.8 kg (134 lb) Comment: stated wt/pt in w/c  SpO2 97%   BMI 25.32 kg/m²   Body mass index is 25.32 kg/m².  Physical Exam   Constitutional: She is oriented to person, place, and time and well-developed, well-nourished, and in no distress. No distress.   HENT:   Head: Normocephalic.   Eyes: Pupils are equal, round, and reactive to light. Conjunctivae are normal.   Cardiovascular: Normal rate, regular rhythm and normal heart sounds.   Pulmonary/Chest: Effort normal and breath sounds normal. No respiratory " distress.   Abdominal: Soft. Bowel sounds are normal. She exhibits no distension. There is no tenderness.   Neurological: She is alert and oriented to person, place, and time. No cranial nerve deficit.   Skin: Skin is warm and dry. No rash noted.   Psychiatric: Mood and affect normal.       Labs: Pertinent labs reviewed.    Assessment:  1. Abnormal esophagram    2. Esophageal dysphagia        Recommendations:  - needs EGD.   -     Case request GI: ESOPHAGOGASTRODUODENOSCOPY (EGD)    Return to Clinic:  Follow up to be determined after procedure.    Thank you for the opportunity to participate in the care of this patient.  LUTHER Kauffman

## 2019-11-14 ENCOUNTER — NURSE TRIAGE (OUTPATIENT)
Dept: ADMINISTRATIVE | Facility: CLINIC | Age: 84
End: 2019-11-14

## 2019-11-14 ENCOUNTER — TELEPHONE (OUTPATIENT)
Dept: GASTROENTEROLOGY | Facility: CLINIC | Age: 84
End: 2019-11-14

## 2019-11-14 NOTE — TELEPHONE ENCOUNTER
She should be taking pantoprazole 40 mg twice a day and Pepcid 20 mg twice a day. She was unable to take Carafate secondary to side effects. No additional medications available for GERD pain.

## 2019-11-14 NOTE — TELEPHONE ENCOUNTER
"    Reason for Disposition   Nursing judgment    Protocols used: ST NO PROTOCOL CALL: SICK ADULT-A-[place]    Danielle's home health nurse, RN Billie, called to say her GERD is painful today, and wants to know if there is anything else she can have for this discomfort / for relief.  Billie is with [facility].  Explained that patient's pcp is Dr Qureshi, and is in community practice, but she is trying to reach GI for this purpose.  Danielle's VS:  148/70, pulse 78, resp even/ unlabored, lung sounds clear, no murmurs no rubs, and she is saturating at 100% on room air.  Temp is 97.9.  Billie states Danielle "knows this is GERD, and she is not having any heart chest pain".  Message to Maggie Yoder NP in GI.  Message also to Dr Jan Qureshi, pcp. Please contact Billie on her cell at [phone] directly with any additional care advice.  Per [facility] triage protocol, recommend call back within one hour.  "

## 2019-11-14 NOTE — TELEPHONE ENCOUNTER
Returned call to pt's daughter. Advised that PA for pantoprazole has been submitted per [employer]eds

## 2019-11-14 NOTE — TELEPHONE ENCOUNTER
----- Message from Cassandra Reynoso sent at 11/14/2019 12:21 PM CST -----  Contact: Daughter  Request a call concerning this pt, no additional info given and can be reached at [phone]///[email or web address]

## 2019-11-15 ENCOUNTER — TELEPHONE (OUTPATIENT)
Dept: GASTROENTEROLOGY | Facility: CLINIC | Age: 84
End: 2019-11-15

## 2019-11-15 ENCOUNTER — ANESTHESIA EVENT (OUTPATIENT)
Dept: ENDOSCOPY | Facility: HOSPITAL | Age: 84
End: 2019-11-15
Payer: MEDICARE

## 2019-11-15 ENCOUNTER — ANESTHESIA (OUTPATIENT)
Dept: ENDOSCOPY | Facility: HOSPITAL | Age: 84
End: 2019-11-15
Payer: MEDICARE

## 2019-11-15 ENCOUNTER — HOSPITAL ENCOUNTER (OUTPATIENT)
Facility: HOSPITAL | Age: 84
Discharge: HOME | End: 2019-11-15
Attending: INTERNAL MEDICINE | Admitting: INTERNAL MEDICINE
Payer: MEDICARE

## 2019-11-15 VITALS
WEIGHT: 133 LBS | HEART RATE: 78 BPM | OXYGEN SATURATION: 96 % | RESPIRATION RATE: 20 BRPM | BODY MASS INDEX: 25.11 KG/M2 | HEIGHT: 61 IN | SYSTOLIC BLOOD PRESSURE: 140 MMHG | DIASTOLIC BLOOD PRESSURE: 89 MMHG | TEMPERATURE: 98 F

## 2019-11-15 DIAGNOSIS — R13.12 OROPHARYNGEAL DYSPHAGIA: ICD-10-CM

## 2019-11-15 DIAGNOSIS — R13.19 ESOPHAGEAL DYSPHAGIA: Primary | ICD-10-CM

## 2019-11-15 PROCEDURE — 37000009 HC ANESTHESIA EA ADD 15 MINS: Performed by: INTERNAL MEDICINE

## 2019-11-15 PROCEDURE — 43235 PR EGD, FLEX, DIAGNOSTIC: ICD-10-PCS | Mod: ,,, | Performed by: INTERNAL MEDICINE

## 2019-11-15 PROCEDURE — 43239 EGD BIOPSY SINGLE/MULTIPLE: CPT | Performed by: INTERNAL MEDICINE

## 2019-11-15 PROCEDURE — 27201012 HC FORCEPS, HOT/COLD, DISP: Performed by: INTERNAL MEDICINE

## 2019-11-15 PROCEDURE — 25000003 PHARM REV CODE 250: Performed by: NURSE ANESTHETIST, CERTIFIED REGISTERED

## 2019-11-15 PROCEDURE — 43235 EGD DIAGNOSTIC BRUSH WASH: CPT | Mod: ,,, | Performed by: INTERNAL MEDICINE

## 2019-11-15 PROCEDURE — 37000008 HC ANESTHESIA 1ST 15 MINUTES: Performed by: INTERNAL MEDICINE

## 2019-11-15 PROCEDURE — 43235 EGD DIAGNOSTIC BRUSH WASH: CPT | Performed by: INTERNAL MEDICINE

## 2019-11-15 PROCEDURE — 63600175 PHARM REV CODE 636 W HCPCS: Performed by: NURSE ANESTHETIST, CERTIFIED REGISTERED

## 2019-11-15 RX ORDER — SODIUM CHLORIDE, SODIUM LACTATE, POTASSIUM CHLORIDE, CALCIUM CHLORIDE 600; 310; 30; 20 MG/100ML; MG/100ML; MG/100ML; MG/100ML
INJECTION, SOLUTION INTRAVENOUS CONTINUOUS PRN
Status: DISCONTINUED | OUTPATIENT
Start: 2019-11-15 | End: 2019-11-15

## 2019-11-15 RX ORDER — PROPOFOL 10 MG/ML
VIAL (ML) INTRAVENOUS
Status: DISCONTINUED | OUTPATIENT
Start: 2019-11-15 | End: 2019-11-15

## 2019-11-15 RX ORDER — LIDOCAINE HYDROCHLORIDE 10 MG/ML
INJECTION, SOLUTION EPIDURAL; INFILTRATION; INTRACAUDAL; PERINEURAL
Status: DISCONTINUED | OUTPATIENT
Start: 2019-11-15 | End: 2019-11-15

## 2019-11-15 RX ADMIN — SODIUM CHLORIDE, SODIUM LACTATE, POTASSIUM CHLORIDE, AND CALCIUM CHLORIDE: 600; 310; 30; 20 INJECTION, SOLUTION INTRAVENOUS at 01:11

## 2019-11-15 RX ADMIN — PROPOFOL 30 MG: 10 INJECTION, EMULSION INTRAVENOUS at 01:11

## 2019-11-15 RX ADMIN — PROPOFOL 20 MG: 10 INJECTION, EMULSION INTRAVENOUS at 01:11

## 2019-11-15 RX ADMIN — LIDOCAINE HYDROCHLORIDE 50 MG: 10 INJECTION, SOLUTION EPIDURAL; INFILTRATION; INTRACAUDAL; PERINEURAL at 01:11

## 2019-11-15 RX ADMIN — PROPOFOL 50 MG: 10 INJECTION, EMULSION INTRAVENOUS at 01:11

## 2019-11-15 NOTE — TRANSFER OF CARE
"Anesthesia Transfer of Care Note    Patient: Danielle Cruz    Procedure(s) Performed: Procedure(s) (LRB):  ESOPHAGOGASTRODUODENOSCOPY (EGD) (N/A)    Patient location: GI    Anesthesia Type: MAC    Transport from OR: Transported from OR on room air with adequate spontaneous ventilation    Post pain: adequate analgesia    Post assessment: no apparent anesthetic complications    Post vital signs: stable    Level of consciousness: awake    Nausea/Vomiting: no nausea/vomiting    Complications: none    Transfer of care protocol was followed      Last vitals:   Visit Vitals  BP (!) 144/84 (BP Location: Left arm, Patient Position: Lying)   Pulse 81   Temp 36.6 °C (97.9 °F) (Temporal)   Resp 18   Ht 5' 1" (1.549 m)   Wt 60.3 kg (133 lb)   SpO2 96%   Breastfeeding? No   BMI 25.13 kg/m²     "

## 2019-11-15 NOTE — [employer] PATIENT INSTRUCTIONS
Discharge Summary/Instructions after an Endoscopic Procedure  Patient Name: Danielle Cruz  Patient MRN: [id number]  Patient YOB: 1931  Friday, November 15, 2019 Renard Lopez III, MD  RESTRICTIONS:  During your procedure today, you received medications for sedation.  These   medications may affect your judgment, balance and coordination.  Therefore,   for 24 hours, you have the following restrictions:   - DO NOT drive a car, operate machinery, make legal/financial decisions,   sign important papers or drink alcohol.    ACTIVITY:  Today: no heavy lifting, straining or running due to procedural   sedation/anesthesia.  The following day: return to full activity including work.  DIET:  Eat and drink normally unless instructed otherwise.     TREATMENT FOR COMMON SIDE EFFECTS:  - Mild abdominal pain, nausea, belching, bloating or excessive gas:  rest,   eat lightly and use a heating pad.  - Sore Throat: treat with throat lozenges and/or gargle with warm salt   water.  - Because air was used during the procedure, expelling large amounts of air   from your rectum or belching is normal.  - If a bowel prep was taken, you may not have a bowel movement for 1-3 days.    This is normal.  SYMPTOMS TO WATCH FOR AND REPORT TO YOUR PHYSICIAN:  1. Abdominal pain or bloating, other than gas cramps.  2. Chest pain.  3. Back pain.  4. Signs of infection such as: chills or fever occurring within 24 hours   after the procedure.  5. Rectal bleeding, which would show as bright red, maroon, or black stools.   (A tablespoon of blood from the rectum is not serious, especially if   hemorrhoids are present.)  6. Vomiting.  7. Weakness or dizziness.  GO DIRECTLY TO THE NEAREST EMERGENCY ROOM IF YOU HAVE ANY OF THE FOLLOWING:      Difficulty breathing              Chills and/or fever over 101 F   Persistent vomiting and/or vomiting blood   Severe abdominal pain   Severe chest pain   Black, tarry stools   Bleeding- more than one  tablespoon   Any other symptom or condition that you feel may need urgent attention  Your doctor recommends these additional instructions:  If any biopsies were taken, your doctors clinic will contact you in 1 to 2   weeks with any results.  - Discharge patient to home (via wheelchair).   - Resume previous diet.   - Continue present medications.   - Return to GI clinic as previously scheduled.  For questions, problems or results please call your physician Renard Lopez III, MD at Work:  [phone]  If you have any questions about the above instructions, call the GI   department at [phone] or call the endoscopy unit at [phone]   from 7am until 3 pm.  [facility], EMERGENCY ROOM PHONE NUMBER:   [phone]  IF A COMPLICATION OR EMERGENCY SITUATION ARISES AND YOU ARE UNABLE TO REACH   YOUR PHYSICIAN - GO DIRECTLY TO THE EMERGENCY ROOM.  I have read or have had read to me these discharge instructions for my   procedure and have received a written copy.  I understand these   instructions and will follow-up with my physician if I have any questions.     __________________________________       _____________________________________  Nurse Signature                                          Patient/Designated   Responsible Party Signature  Renard Lopez III, MD  11/15/2019 2:11:35 PM  This report has been verified and signed electronically.  [employer]

## 2019-11-15 NOTE — TELEPHONE ENCOUNTER
Returned call to pt's daughter and informed her that PA for pantoprazole twice a day was approved and pharmacy was notified.

## 2019-11-15 NOTE — DISCHARGE INSTRUCTIONS

## 2019-11-15 NOTE — DISCHARGE SUMMARY
[facility]  Brief Operative Note     SUMMARY     Surgery Date: 11/15/2019     Surgeon(s) and Role:     * Renard Lopez III, MD - Primary    Assisting Surgeon: None    Pre-op Diagnosis:  Abnormal esophagram [R93.3]  Esophageal dysphagia [R13.10]    Post-op Diagnosis:  Post-Op Diagnosis Codes:     * Abnormal esophagram [R93.3]     * Esophageal dysphagia [R13.10]    Procedure(s) (LRB):  ESOPHAGOGASTRODUODENOSCOPY (EGD) (N/A)    Anesthesia: Monitor Anesthesia Care    Description of the findings of the procedure: Procedures completed. See Procedure note for full details.    Findings/Key Components: Procedures completed. See Procedure note for full details.    Prosthetics/Devices: None    Estimated Blood Loss: 0 mL         Specimens:   Specimen (12h ago, onward)    None          Discharge Note    SUMMARY     Admit Date: 11/15/2019    Discharge Date and Time: 11/15/2019    Hospital Course (synopsis of major diagnoses, care, treatment, and services provided during the course of the hospital stay):  Procedures completed. See Procedure note for full details. Discharge patient when discharge criteria met.    Final Diagnosis: Post-Op Diagnosis Codes:     * Abnormal esophagram [R93.3]     * Esophageal dysphagia [R13.10]    Disposition: Discharge patient when discharge criteria met.    Follow Up/Patient Instructions:       Medications:  Reconciled Home Medications:   Current Discharge Medication List      CONTINUE these medications which have NOT CHANGED    Details   aluminum-magnesium hydroxide-simethicone (MAALOX) 200-200-20 mg/5 mL Susp Take 30 mLs by mouth every 6 (six) hours as needed.      amLODIPine (NORVASC) 5 MG tablet Take 1 tablet (5 mg total) by mouth once daily.  Qty: 30 tablet, Refills: 3      aspirin (ECOTRIN) 81 MG EC tablet Take 81 mg by mouth once daily.      atorvastatin (LIPITOR) 40 MG tablet Take 1 tablet (40 mg total) by mouth nightly.  Qty: 60 tablet, Refills: 1      famotidine (PEPCID)  20 MG tablet Take 1 tablet (20 mg total) by mouth 2 (two) times daily.  Qty: 60 tablet, Refills: 5      furosemide (LASIX) 40 MG tablet Take 1 tablet (40 mg total) by mouth daily as needed (For leg swelling and weight gain 3 lbs).  Qty: 60 tablet, Refills: 3      hydrALAZINE (APRESOLINE) 25 MG tablet Take 25 mg by mouth once daily.      levothyroxine (SYNTHROID) 25 MCG tablet Take 25 mcg by mouth once daily.      ondansetron (ZOFRAN-ODT) 4 MG TbDL       pantoprazole (PROTONIX) 40 MG tablet Take 1 tablet (40 mg total) by mouth 2 (two) times daily.  Qty: 60 tablet, Refills: 2      traMADol (ULTRAM) 50 mg tablet Take 1 tablet (50 mg total) by mouth every 8 (eight) hours as needed for Pain.  Qty: 12 tablet, Refills: 0    Comments: Quantity prescribed more than 7 day supply? No            Discharge Procedure Orders   Diet general     Activity as tolerated

## 2019-11-15 NOTE — ANESTHESIA PREPROCEDURE EVALUATION
11/15/2019  Danielle Cruz is a 88 y.o., female.    Anesthesia Evaluation    I have reviewed the Patient Summary Reports.     I have reviewed the Medications.     Review of Systems  Anesthesia Hx:  No problems with previous Anesthesia  History of prior surgery of interest to airway management or planning: Denies Family Hx of Anesthesia complications.   Denies Personal Hx of Anesthesia complications.   Social:  Former Smoker    Hematology/Oncology:     Oncology Normal     Cardiovascular:   Hypertension CAD  CABG/stent  CHF hyperlipidemia Echo 10/19  CONCLUSIONS     1 - Normal left ventricular systolic function (EF 55-60%).     2 - Impaired LV relaxation, elevated LAP (grade 2 diastolic dysfunction).     3 - Normal right ventricular systolic function .     4 - No wall motion abnormalities.     5 - The estimated PA systolic pressure is greater than 37 mmHg.     6 - Mild aortic stenosis, ARMANDO = 1.73 cm2, peak velocity = 1.84 m/s, mean gradient = 5 mmHg.     7 - The mitral valve is mildly sclerotic with mildly restricted leaflet mobility.     8 - Mild tricuspid regurgitation.    Pulmonary:  Pulmonary Normal    Renal/:   Chronic Renal Disease, CRI    Hepatic/GI:   GERD    Musculoskeletal:  Musculoskeletal Normal    Neurological:   CVA    Endocrine:   Hypothyroidism    Dermatological:  Skin Normal    Psych:   Psychiatric History depression          Physical Exam  General:  Well nourished    Airway/Jaw/Neck:  Airway Findings: Mouth Opening: Normal Tongue: Normal  General Airway Assessment: Adult  Mallampati: II  TM Distance: Normal, at least 6 cm          Chest/Lungs:  Chest/Lungs Findings: Normal Respiratory Rate     Heart/Vascular:  Heart Findings: Rate: Normal             Anesthesia Plan  Type of Anesthesia, risks & benefits discussed:  Anesthesia Type:  MAC  Patient's Preference:   Intra-op Monitoring  Plan: standard ASA monitors  Intra-op Monitoring Plan Comments:   Post Op Pain Control Plan:   Post Op Pain Control Plan Comments:   Induction:   IV  Beta Blocker:  Patient is not currently on a Beta-Blocker (No further documentation required).       Informed Consent: Patient understands risks and agrees with Anesthesia plan.  Questions answered. Anesthesia consent signed with patient.  ASA Score: 3     Day of Surgery Review of History & Physical:    H&P update referred to the surgeon.         Ready For Surgery From Anesthesia Perspective.

## 2019-11-15 NOTE — ANESTHESIA POSTPROCEDURE EVALUATION
Anesthesia Post Evaluation    Patient: Danielle Cruz    Procedure(s) Performed: Procedure(s) (LRB):  ESOPHAGOGASTRODUODENOSCOPY (EGD) (N/A)    Final Anesthesia Type: MAC    Patient location during evaluation: GI PACU  Patient participation: Yes- Able to Participate  Level of consciousness: awake and alert  Post-procedure vital signs: reviewed and stable  Pain management: adequate  Airway patency: patent    PONV status at discharge: No PONV  Anesthetic complications: no      Cardiovascular status: blood pressure returned to baseline  Respiratory status: unassisted  Hydration status: euvolemic  Follow-up not needed.          Vitals Value Taken Time   /84 11/15/2019 11:22 AM   Temp 36.6 °C (97.9 °F) 11/15/2019 11:22 AM   Pulse 81 11/15/2019 11:22 AM   Resp 18 11/15/2019 11:22 AM   SpO2 96 % 11/15/2019 11:22 AM         No case tracking events are documented in the log.      Pain/Jak Score: Jak Score: 10 (11/15/2019  1:52 PM)

## 2019-11-15 NOTE — TELEPHONE ENCOUNTER
----- Message from Blanca Raphael sent at 11/15/2019 11:43 AM CST -----  Contact: pt daughter   Caller request a call back regarding status of pts medication ... Caller stated she spoke to Joseph pt will be out the medication  ... Call back: [phone] (home)

## 2019-11-15 NOTE — ANESTHESIA RELEASE NOTE
"Anesthesia Release from PACU Note    Patient: Danielle Cruz    Procedure(s) Performed: Procedure(s) (LRB):  ESOPHAGOGASTRODUODENOSCOPY (EGD) (N/A)    Anesthesia type: MAC    Post pain: Adequate analgesia    Post assessment: no apparent anesthetic complications    Last Vitals:   Visit Vitals  BP (!) 144/84 (BP Location: Left arm, Patient Position: Lying)   Pulse 81   Temp 36.6 °C (97.9 °F) (Temporal)   Resp 18   Ht 5' 1" (1.549 m)   Wt 60.3 kg (133 lb)   SpO2 96%   Breastfeeding? No   BMI 25.13 kg/m²       Post vital signs: stable    Level of consciousness: awake    Nausea/Vomiting: no nausea/no vomiting    Complications: none    Airway Patency: patent    Respiratory: unassisted    Cardiovascular: stable and blood pressure at baseline    Hydration: euvolemic  "

## 2019-11-18 ENCOUNTER — TELEPHONE (OUTPATIENT)
Dept: GASTROENTEROLOGY | Facility: CLINIC | Age: 84
End: 2019-11-18

## 2019-11-18 ENCOUNTER — HOSPITAL ENCOUNTER (OUTPATIENT)
Dept: RADIOLOGY | Facility: HOSPITAL | Age: 84
Discharge: HOME | End: 2019-11-18
Attending: INTERNAL MEDICINE
Payer: MEDICARE

## 2019-11-18 DIAGNOSIS — R13.12 OROPHARYNGEAL DYSPHAGIA: ICD-10-CM

## 2019-11-18 PROCEDURE — A9698 NON-RAD CONTRAST MATERIALNOC: HCPCS | Performed by: INTERNAL MEDICINE

## 2019-11-18 PROCEDURE — 92611 MOTION FLUOROSCOPY/SWALLOW: CPT

## 2019-11-18 PROCEDURE — 74230 X-RAY XM SWLNG FUNCJ C+: CPT | Mod: TC

## 2019-11-18 PROCEDURE — 25500020 PHARM REV CODE 255: Performed by: INTERNAL MEDICINE

## 2019-11-18 RX ADMIN — BARIUM SULFATE 55 G: 960 POWDER, FOR SUSPENSION ORAL at 11:11

## 2019-11-18 NOTE — PROCEDURES
Modified Barium Swallow    Patient Name:  Danielle Cruz   MRN:  [id number]      Recommendations:     Recommendations:                General Recommendations:  GI evaluation  Diet recommendations:   ,   liquid diet however nutrition is a concern due to decreased passage through esophagus  Aspiration Precautions: HOB to 90 degrees, Remain upright 30 minutes post meal and Small bites/sips   General Precautions: Standard,    Communication strategies:  none    Referral     Reason for Referral  Patient was referred for a Modified Barium Swallow Study to assess the efficiency of his/her swallow function, rule out aspiration and make recommendations regarding safe dietary consistencies, effective compensatory strategies, and safe eating environment.     Diagnosis: <principal problem not specified>       History:     Past Medical History:   Diagnosis Date    CHF (congestive heart failure)     Coronary artery disease of native artery of native heart with stable angina pectoris 11/5/2019    CVA (cerebral vascular accident)     Depression     GERD (gastroesophageal reflux disease)     Hyperlipemia     Hypertension     Hypothyroidism        Objective:     Current Respiratory Status: 11/18/19    Alert: yes    Cooperative: yes    Follows Directions: yes    Visualization  · Patient was seen in the lateral view    Oral Peripheral Examination  ·  unremarkable    Consistencies Assessed  · Thin liquids, puree solids    Oral Preparation/Oral Phase  · WFL- Pt with adequate bolus acceptance, containment, control and timely A-P transfer across consistencies     Pharyngeal Phase   WFL    Cervical Esophageal Phase  · Poor passage through esophagus due to possible stricture  · Liquids were taken first and remained in esophagus throughout study and did not pass into the stomach during the duration of the study  · Backflow noted with belching throughout study  · Cricopharyngeal bar noted but did not impeded the flow of  bolus    Assessment:     Impressions  ·  Oral and pharyngeal phases of swallow appear WFL at this time. Esophageal dysphagia noted with liquids and solids not moving through esophagus during the study.    Prognosis: Guarded    Barriers:  · None    Plan  GI consult for esophageal dysfunction    Education  Results were discussed with patient.    Goals:       Plan:   · Patient to be seen:      · Plan of Care expires:     · Plan of Care reviewed with:           Discharge recommendations:      Barriers to Discharge:  None    Time Tracking:   SLP Treatment Date:   11/18/19  Speech Start Time:  1105  Speech Stop Time:  1125     Speech Total Time (min):  20 min    Magaly Bran CCC-SLP  11/18/2019

## 2019-11-18 NOTE — TELEPHONE ENCOUNTER
----- Message from Madi Adam sent at 11/18/2019  1:48 PM CST -----  Contact: Son- Leland Cruz-  [phone]  ..Type:  Test Results    Who Called: Leland Anthony  Name of Test (Lab/Mammo/Etc): Swallowing Test  Date of Test: 11/18/19  Ordering Provider: Dr. Lopez  Where the test was performed: [facility]  Would the patient rather a call back or a response via [facility]? Call back  Best Call Back Number: [phone]  Additional Information:

## 2019-11-18 NOTE — PROGRESS NOTES
"Discussed results of MBSS with Dr. Lopez. Patient has esophageal dysmotility disorder. "Liquids and solids not moving through the esophagus during the study". Recommendations will likely be alternative form of nutrition with a PEG tube. Can consider esophageal manometry to more specifically identify motility disorder but will not likely change the outcome of PEG. She will discuss findings and recommendations with her brother and will contact me to discuss.   "

## 2019-11-19 ENCOUNTER — PATIENT MESSAGE (OUTPATIENT)
Dept: GASTROENTEROLOGY | Facility: CLINIC | Age: 84
End: 2019-11-19

## 2019-11-19 ENCOUNTER — TELEPHONE (OUTPATIENT)
Dept: GASTROENTEROLOGY | Facility: CLINIC | Age: 84
End: 2019-11-19

## 2019-11-19 NOTE — TELEPHONE ENCOUNTER
----- Message from Joseph Suggs LPN sent at 11/19/2019  8:33 AM CST -----  Contact: Patient's son- Leland Cruz      ----- Message -----  From: Jessica Kurtz  Sent: 11/18/2019   2:27 PM CST  To: Beba Serrano Staff    Mr. Leland Cruz is calling to find out the results of mother's swallow test. Please call at Ph .[phone] (Leland Cruz)

## 2019-11-19 NOTE — TELEPHONE ENCOUNTER
Called patients son and scheduled follow up visit per Marcin to be seen in clinic.Appt Is scheduled.

## 2019-11-19 NOTE — TELEPHONE ENCOUNTER
Spoke to patient's son about MBSS results. Recommendations to proceed with PEG tube. Also discussed esophageal manometry but family declined at this time. They will discuss findings with patient and will notify me if wish to proceed with PEG tube. All questions answered.

## 2019-11-20 ENCOUNTER — ANESTHESIA (OUTPATIENT)
Dept: ENDOSCOPY | Facility: HOSPITAL | Age: 84
End: 2019-11-20
Payer: MEDICARE

## 2019-11-20 ENCOUNTER — ANESTHESIA EVENT (OUTPATIENT)
Dept: ENDOSCOPY | Facility: HOSPITAL | Age: 84
End: 2019-11-20
Payer: MEDICARE

## 2019-11-20 ENCOUNTER — HOSPITAL ENCOUNTER (OUTPATIENT)
Facility: HOSPITAL | Age: 84
Discharge: HOME HEALTH | End: 2019-11-22
Attending: INTERNAL MEDICINE | Admitting: INTERNAL MEDICINE
Payer: MEDICARE

## 2019-11-20 DIAGNOSIS — R13.10 DYSPHAGIA: ICD-10-CM

## 2019-11-20 DIAGNOSIS — Z93.1 S/P PERCUTANEOUS ENDOSCOPIC GASTROSTOMY (PEG) TUBE PLACEMENT: ICD-10-CM

## 2019-11-20 DIAGNOSIS — R13.14 PHARYNGOESOPHAGEAL DYSPHAGIA: Primary | ICD-10-CM

## 2019-11-20 DIAGNOSIS — R13.19 ESOPHAGEAL DYSPHAGIA: Primary | ICD-10-CM

## 2019-11-20 PROCEDURE — 63600175 PHARM REV CODE 636 W HCPCS: Performed by: NURSE PRACTITIONER

## 2019-11-20 PROCEDURE — 63600175 PHARM REV CODE 636 W HCPCS: Performed by: NURSE ANESTHETIST, CERTIFIED REGISTERED

## 2019-11-20 PROCEDURE — 37000009 HC ANESTHESIA EA ADD 15 MINS: Performed by: INTERNAL MEDICINE

## 2019-11-20 PROCEDURE — 27201018 HC KIT, PEG (ANY): Performed by: INTERNAL MEDICINE

## 2019-11-20 PROCEDURE — 25000003 PHARM REV CODE 250: Performed by: NURSE ANESTHETIST, CERTIFIED REGISTERED

## 2019-11-20 PROCEDURE — 37000008 HC ANESTHESIA 1ST 15 MINUTES: Performed by: INTERNAL MEDICINE

## 2019-11-20 PROCEDURE — 43246 EGD PLACE GASTROSTOMY TUBE: CPT | Performed by: INTERNAL MEDICINE

## 2019-11-20 PROCEDURE — 43246 EGD PLACE GASTROSTOMY TUBE: CPT | Mod: ,,, | Performed by: INTERNAL MEDICINE

## 2019-11-20 PROCEDURE — 43246 PR EGD, FLEX, W/PLCMT, GASTROSTOMY TUBE: ICD-10-PCS | Mod: ,,, | Performed by: INTERNAL MEDICINE

## 2019-11-20 PROCEDURE — 63600175 PHARM REV CODE 636 W HCPCS: Performed by: INTERNAL MEDICINE

## 2019-11-20 RX ORDER — SODIUM CHLORIDE 0.9 % (FLUSH) 0.9 %
10 SYRINGE (ML) INJECTION
Status: DISCONTINUED | OUTPATIENT
Start: 2019-11-20 | End: 2019-11-20 | Stop reason: HOSPADM

## 2019-11-20 RX ORDER — SODIUM CHLORIDE 9 MG/ML
INJECTION, SOLUTION INTRAVENOUS CONTINUOUS
Status: DISCONTINUED | OUTPATIENT
Start: 2019-11-20 | End: 2019-11-21

## 2019-11-20 RX ORDER — HYDRALAZINE HYDROCHLORIDE 20 MG/ML
10 INJECTION INTRAMUSCULAR; INTRAVENOUS EVERY 6 HOURS PRN
Status: DISCONTINUED | OUTPATIENT
Start: 2019-11-20 | End: 2019-11-22 | Stop reason: HOSPADM

## 2019-11-20 RX ORDER — SODIUM CHLORIDE, SODIUM LACTATE, POTASSIUM CHLORIDE, CALCIUM CHLORIDE 600; 310; 30; 20 MG/100ML; MG/100ML; MG/100ML; MG/100ML
INJECTION, SOLUTION INTRAVENOUS CONTINUOUS
Status: DISCONTINUED | OUTPATIENT
Start: 2019-11-20 | End: 2019-11-20

## 2019-11-20 RX ORDER — MORPHINE SULFATE 4 MG/ML
4 INJECTION, SOLUTION INTRAMUSCULAR; INTRAVENOUS EVERY 4 HOURS PRN
Status: DISCONTINUED | OUTPATIENT
Start: 2019-11-20 | End: 2019-11-21

## 2019-11-20 RX ORDER — ACETAMINOPHEN 10 MG/ML
1000 INJECTION, SOLUTION INTRAVENOUS ONCE
Status: COMPLETED | OUTPATIENT
Start: 2019-11-20 | End: 2019-11-20

## 2019-11-20 RX ORDER — CEFAZOLIN SODIUM 1 G/50ML
1 SOLUTION INTRAVENOUS ONCE
Status: COMPLETED | OUTPATIENT
Start: 2019-11-20 | End: 2019-11-20

## 2019-11-20 RX ORDER — MORPHINE SULFATE 2 MG/ML
2 INJECTION, SOLUTION INTRAMUSCULAR; INTRAVENOUS EVERY 4 HOURS PRN
Status: DISCONTINUED | OUTPATIENT
Start: 2019-11-20 | End: 2019-11-20

## 2019-11-20 RX ORDER — PROPOFOL 10 MG/ML
VIAL (ML) INTRAVENOUS
Status: DISCONTINUED | OUTPATIENT
Start: 2019-11-20 | End: 2019-11-20

## 2019-11-20 RX ORDER — LIDOCAINE HYDROCHLORIDE 10 MG/ML
INJECTION, SOLUTION EPIDURAL; INFILTRATION; INTRACAUDAL; PERINEURAL
Status: DISCONTINUED | OUTPATIENT
Start: 2019-11-20 | End: 2019-11-20

## 2019-11-20 RX ORDER — MORPHINE SULFATE 4 MG/ML
4 INJECTION, SOLUTION INTRAMUSCULAR; INTRAVENOUS ONCE
Status: COMPLETED | OUTPATIENT
Start: 2019-11-20 | End: 2019-11-20

## 2019-11-20 RX ADMIN — PROPOFOL 20 MG: 10 INJECTION, EMULSION INTRAVENOUS at 02:11

## 2019-11-20 RX ADMIN — PROPOFOL 20 MG: 10 INJECTION, EMULSION INTRAVENOUS at 01:11

## 2019-11-20 RX ADMIN — MORPHINE SULFATE 4 MG: 4 INJECTION INTRAVENOUS at 07:11

## 2019-11-20 RX ADMIN — CEFAZOLIN SODIUM 1 G: 1 SOLUTION INTRAVENOUS at 01:11

## 2019-11-20 RX ADMIN — MORPHINE SULFATE 2 MG: 2 INJECTION, SOLUTION INTRAMUSCULAR; INTRAVENOUS at 06:11

## 2019-11-20 RX ADMIN — SODIUM CHLORIDE: 0.9 INJECTION, SOLUTION INTRAVENOUS at 07:11

## 2019-11-20 RX ADMIN — HYDRALAZINE HYDROCHLORIDE 10 MG: 20 INJECTION INTRAMUSCULAR; INTRAVENOUS at 07:11

## 2019-11-20 RX ADMIN — LIDOCAINE HYDROCHLORIDE 50 MG: 10 INJECTION, SOLUTION EPIDURAL; INFILTRATION; INTRACAUDAL; PERINEURAL at 01:11

## 2019-11-20 RX ADMIN — ACETAMINOPHEN 1000 MG: 10 INJECTION, SOLUTION INTRAVENOUS at 07:11

## 2019-11-20 RX ADMIN — PROPOFOL 40 MG: 10 INJECTION, EMULSION INTRAVENOUS at 01:11

## 2019-11-20 RX ADMIN — SODIUM CHLORIDE, SODIUM LACTATE, POTASSIUM CHLORIDE, AND CALCIUM CHLORIDE: 600; 310; 30; 20 INJECTION, SOLUTION INTRAVENOUS at 01:11

## 2019-11-20 NOTE — PROGRESS NOTES
Patient has arrived to floor Dr Joiner notified via secure chat. Patient is in hospital bed. Patient is awake, alert and oriented to person. NAD noted. She is complaining of some pain to the abdominal area. She was placed on bedpan by endo nurses and patient did void approximately 50cc of clear yellow urine with no signs of dysuria. She has family at the bedside. vss and wnl. She does have a bedsore that was noticed on her last admission to [facility]. It is no her sacral area. It is open and covered with sacral foam dressing. It is not draining. Will continue to monitor.

## 2019-11-20 NOTE — PROGRESS NOTES
Patient's peg tube area has a dressing covering the peg tube. The dressing is clean, dry and intact. It is 4x4 gauze with tape.

## 2019-11-20 NOTE — ANESTHESIA POSTPROCEDURE EVALUATION
Anesthesia Post Evaluation    Patient: Danielle Cruz    Procedure(s) Performed: Procedure(s) (LRB):  INSERTION, PEG TUBE (N/A)    Final Anesthesia Type: MAC    Patient location during evaluation: GI PACU  Patient participation: Yes- Able to Participate  Level of consciousness: awake and alert  Post-procedure vital signs: reviewed and stable  Pain management: adequate  Airway patency: patent    PONV status at discharge: No PONV  Anesthetic complications: no      Cardiovascular status: blood pressure returned to baseline  Respiratory status: unassisted  Hydration status: euvolemic  Follow-up not needed.          Vitals Value Taken Time   /62 11/20/2019 12:44 PM   Temp 36.7 °C (98.1 °F) 11/20/2019 12:44 PM   Pulse 76 11/20/2019 12:44 PM   Resp 18 11/20/2019 12:44 PM   SpO2 97 % 11/20/2019 12:44 PM         No case tracking events are documented in the log.      Pain/Jak Score: Jak Score: 10 (11/20/2019  2:16 PM)

## 2019-11-20 NOTE — DISCHARGE INSTRUCTIONS
Gastrostomy Feeding Tube Care: Flushing  With gastrostomy tube feeding, you need to keep the tube from getting clogged by flushing it with warm water after each feeding and before and after giving any medicines.             For continuous feeding  Flush the feeding tube with warm water and a clean syringe before the first daily feeding, after the last daily feeding, and other times as instructed. Follow the steps below:  1. Fill a clean bowl with warm water.  2. Put the tip of the syringe in the water.  3. Draw up 50 cc of water.  4. Turn off the pump.  5. Close the clamp on the feeding bag tubing.  6. Remove the tubing from the port.  7. Put the tip of the syringe in the feeding port.  8. Push the plunger down.  9. Let the water run through the feeding tube.  10. Start the feeding or close the cap on the feeding port.  11. Tape the tube to the skin with medical tape.         For bolus feeding  You may be told to flush the feeding tube before and after each feeding, or just after feedings. Use a clean syringe and warm water. Follow the steps below:  1. Fill a clean bowl with warm water.  2. Put the tip of the syringe in the water.  3. Draw up 50 cc of water (tap water is OK to use).   4. Open the cap on the feeding port.  5. Put the tip of the syringe in the feeding port.  6. Push down on the plunger. Let the water run through the tube.  7. Close the cap.  8. Tape the tube to the skin with medical tape.  Date Last Reviewed: 6/1/2016  © 5720-4705 [employer]. [address] [place] [address], [place] [address]. All rights reserved. This information is not intended as a substitute for professional medical care. Always follow your healthcare professional's instructions.        Discharge Instructions: Caring for Your Gastrostomy Tube (G-Tube)  You have been discharged with a gastrostomy tube, or G-tube. The G-tube was inserted through your belly (abdominal) wall and into your stomach. The tube will provide you  with food, fluids, and medicine. Your G-tube may move in and out slightly. If the tube comes out all the way in the first few weeks after placement, dont put it back in. Call your healthcare provider right away. Dont wait until the next day. This is important because the G-tube tract through the skin may close very quickly, often within 24 hours. After the first few weeks, if the tube comes out, ask your provider how to get a spare tube. Ask your provider how to replace it at home.   General guidelines for use  · Wash your hands thoroughly with soap and warm water before starting your feeding.  · During the feeding and for 1 hour after, sit in a chair or sit up in bed.  · Before feeding begins, check to see that your stomach is empty. You will need a syringe for the following steps:   ¨ Insert the tip of an empty syringe into the end of the G-tube.  ¨ Pull back on the syringe to withdraw contents of the stomach.  ¨ Dont begin the feeding if more than 100 mL remains from the previous feeding.  · Clean the area around the tube with mild soap and water.  · Pat the area dry during bathing and as needed.  · Clean the area more often if it gets wet or is leaking some discharge (weeping).  · Keep the disk (flange) a few millimeters off the skin. This should leave just enough room for a gauze sponge. Pulling the flange too tightly can damage the skin. But leaving the flange too loose leads to leaking around the G-tube. Your healthcare team will go over these guidelines before you leave the hospital.     The name of my feeding supplement/formula is:  ___________________________________________     Amount per feeding:   ___________________________________________     Times per day:  ___________________________________________     Amount of water used to flush tube:  ___________________________________________   Gravity feeding method  · Fill the feeding bag with the prescribed amount of formula. Run the fluid to the end of  the tube to clear out any air. Clamp the tube.  · Connect the end of the feeding bag tubing to the G-tube.  · Hang the bag at least 18 inches above the level of your G-tube.  · Open the clamp and allow the formula to flow into the G-tube.  · Follow with the prescribed amount of water.  · After each feeding, rinse the bag and tubing. Every 24 hours, wash the bag and tubing with soapy water and rinse thoroughly.  Pump feeding method  · Fill the feeding bag with the prescribed amount of formula. Run the fluid to the end of the tube to clear out any air. Clamp the tube.  · Connect the end of the feeding bag tubing to the G-tube. Set the pump rate of flow to the prescribed rate per hour.  · Open the clamp on the tubing; press the start button on your pump.  · When feeding is complete, disconnect the feeding set.  · Connect the tip of an empty syringe to the feeding tube and slowly push in the prescribed amount of water.  · After each feeding, rinse the bag and tubing. Every 24 hours, wash the bag and tubing with soapy water and rinse thoroughly.  Syringe feeding method  · Remove the plunger from a syringe and connect the syringe to the G-tube.  · Hold the syringe upright and pour the formula into the syringe.  · Refill the syringe as the formula reaches the bottom of the syringe.  · Repeat the process until the prescribed amount of formula is given.  · Follow the feeding with the prescribed amount of water.  · After each feeding, rinse the bag and tubing. Every 24 hours, wash the bag and tubing with soapy water and rinse thoroughly.  When to call your provider  Call your healthcare provider right away if you have any of the following:  · The tube comes out   · The tube is blocked  · Vomiting  · Fever above 100.4°F (38.0°C)  · Diarrhea that lasts more than 2 days  · Signs of infection (redness, swelling, or warmth at the tube site)  · Drainage from the tube site   Date Last Reviewed: 8/1/2016  © 6726-2934 [employer]. [address] [place] [address], [place] [address]. All rights reserved. This information is not intended as a substitute for professional medical care. Always follow your healthcare professional's instructions.        Understanding PEG Tube Feeding    Healthcare providers use PEG tube feeding (also called percutaneous endoscopic gastrostomy) when you cant swallow food safely or there is a blockage in your esophagus or stomach. Healthcare providers also use the tube if you cant take enough food by mouth. The feeding tube lets food bypass the mouth and esophagus and go directly into your stomach or small intestine.  The path of food  When you take food by mouth, you chew your food into small pieces and swallow. The food moves down your esophagus into your stomach. From there, it goes into your small intestine and then into your large intestine. Solid waste (stool) is stored in your rectum and passed out through the anus.  How a PEG feeding tube is placed  Your healthcare provider places PEG tubes with the aid of a special instrument called an endoscope. This is a long, flexible, lighted tube that allows your healthcare provider to see inside your stomach. Your healthcare provider passes the endoscope through your mouth into your stomach. Your healthcare provider will also make a small surgical cut through your skin and into your stomach. He or she inserts the PEG tube through the opening while watching through the endoscope. A special balloon or cap holds the PEG tube in place inside your stomach. Your healthcare provider places a small dressing at the new opening.  Digestion works the same  Digestion works the same with a feeding tube as it does when you take food by mouth. So you get the same nutrition by tube feeding as you would get by mouth. If you have any questions or concerns about the feeding tube or its care, be certain to ask your provider. If you want a partner or significant other educated along  with you about the feeding tube, let your provider or medical team know.   Date Last Reviewed: 7/1/2016  © 6813-2927 [employer]. [address] [place] [address], [place] [address]. All rights reserved. This information is not intended as a substitute for professional medical care. Always follow your healthcare professional's instructions.

## 2019-11-20 NOTE — [employer] PATIENT INSTRUCTIONS
Discharge Summary/Instructions after an Endoscopic Procedure  Patient Name: Danielle Cruz  Patient MRN: [id number]  Patient YOB: 1931 Wednesday, November 20, 2019 Renard Lopez III, MD  RESTRICTIONS:  During your procedure today, you received medications for sedation.  These   medications may affect your judgment, balance and coordination.  Therefore,   for 24 hours, you have the following restrictions:   - DO NOT drive a car, operate machinery, make legal/financial decisions,   sign important papers or drink alcohol.    ACTIVITY:  Today: no heavy lifting, straining or running due to procedural   sedation/anesthesia.  The following day: return to full activity including work.  DIET:  Eat and drink normally unless instructed otherwise.     TREATMENT FOR COMMON SIDE EFFECTS:  - Mild abdominal pain, nausea, belching, bloating or excessive gas:  rest,   eat lightly and use a heating pad.  - Sore Throat: treat with throat lozenges and/or gargle with warm salt   water.  - Because air was used during the procedure, expelling large amounts of air   from your rectum or belching is normal.  - If a bowel prep was taken, you may not have a bowel movement for 1-3 days.    This is normal.  SYMPTOMS TO WATCH FOR AND REPORT TO YOUR PHYSICIAN:  1. Abdominal pain or bloating, other than gas cramps.  2. Chest pain.  3. Back pain.  4. Signs of infection such as: chills or fever occurring within 24 hours   after the procedure.  5. Rectal bleeding, which would show as bright red, maroon, or black stools.   (A tablespoon of blood from the rectum is not serious, especially if   hemorrhoids are present.)  6. Vomiting.  7. Weakness or dizziness.  GO DIRECTLY TO THE NEAREST EMERGENCY ROOM IF YOU HAVE ANY OF THE FOLLOWING:      Difficulty breathing              Chills and/or fever over 101 F   Persistent vomiting and/or vomiting blood   Severe abdominal pain   Severe chest pain   Black, tarry stools   Bleeding- more than one  tablespoon   Any other symptom or condition that you feel may need urgent attention  Your doctor recommends these additional instructions:  If any biopsies were taken, your doctors clinic will contact you in 1 to 2   weeks with any results.  - Admit the patient to hospital [facility] for observation.   - NPO.   - Continue present medications.   - Please follow the post-PEG recommendations including: Nutrition consult   for formula and volume, may use PEG today for meds and water, may use PEG   tomorrow for feedings and clean site with soap and water daily and dry   thoroughly.   - Notify patient's Home Health service of newly placed PEG tube.  For questions, problems or results please call your physician Renard Lopez III, MD at Work:  [phone]  If you have any questions about the above instructions, call the GI   department at [phone] or call the endoscopy unit at [phone]   from 7am until 3 pm.  [facility], EMERGENCY ROOM PHONE NUMBER:   [phone]  IF A COMPLICATION OR EMERGENCY SITUATION ARISES AND YOU ARE UNABLE TO REACH   YOUR PHYSICIAN - GO DIRECTLY TO THE EMERGENCY ROOM.  I have read or have had read to me these discharge instructions for my   procedure and have received a written copy.  I understand these   instructions and will follow-up with my physician if I have any questions.     __________________________________       _____________________________________  Nurse Signature                                          Patient/Designated   Responsible Party Signature  Renard Lopez III, MD  11/20/2019 2:23:01 PM  This report has been verified and signed electronically.  [employer]

## 2019-11-20 NOTE — ANESTHESIA RELEASE NOTE
Anesthesia Release from PACU Note    Patient: Danielle Cruz    Procedure(s) Performed: Procedure(s) (LRB):  INSERTION, PEG TUBE (N/A)    Anesthesia type: MAC    Post pain: Adequate analgesia    Post assessment: no apparent anesthetic complications    Last Vitals:   Visit Vitals  BP (!) 149/62 (BP Location: Left arm, Patient Position: Sitting)   Pulse 76   Temp 36.7 °C (98.1 °F) (Temporal)   Resp 18   SpO2 97%   Breastfeeding? No       Post vital signs: stable    Level of consciousness: awake    Nausea/Vomiting: no nausea/no vomiting    Complications: none    Airway Patency: patent    Respiratory: unassisted    Cardiovascular: stable and blood pressure at baseline    Hydration: euvolemic

## 2019-11-20 NOTE — PROGRESS NOTES
I have been in contact with patient's family on multiple occasions this week.   Patient and family have agreed to proceed with PEG tube. Patient is unable to swallow solids or liquids at this time.   She was scheduled an appointment for today but she does not need to come in as I will place orders for PEG to be done ASAP. Will contact endoscopy.    No solid foods since 11/19/19 in the AM.   Last intake was a few sips of ensure no later than 0500 on 11/20/19.     Plan for observation stay after procedure with nutrition counseling.

## 2019-11-20 NOTE — TRANSFER OF CARE
Anesthesia Transfer of Care Note    Patient: Danielle Cruz    Procedure(s) Performed: Procedure(s) (LRB):  INSERTION, PEG TUBE (N/A)    Patient location: GI    Anesthesia Type: MAC    Transport from OR: Transported from OR on room air with adequate spontaneous ventilation    Post pain: adequate analgesia    Post assessment: no apparent anesthetic complications    Post vital signs: stable    Level of consciousness: awake    Nausea/Vomiting: no nausea/vomiting    Complications: none    Transfer of care protocol was followed      Last vitals:   Visit Vitals  BP (!) 149/62 (BP Location: Left arm, Patient Position: Sitting)   Pulse 76   Temp 36.7 °C (98.1 °F) (Temporal)   Resp 18   SpO2 97%   Breastfeeding? No

## 2019-11-20 NOTE — INTERVAL H&P NOTE
The patient has been examined and the H&P has been reviewed:I have reviewed this note and I agree with this assessment. The patient was seen in the GI office and remains stable for endoscopy at the time of this present evaluation. 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 Unable to handle either liquids or solids from information gathered from family. PEG has been scheduled for today.         Anesthesia/Surgery risks, benefits and alternative options discussed and understood by patient/family.          There are no hospital problems to display for this patient.

## 2019-11-20 NOTE — ANESTHESIA PREPROCEDURE EVALUATION
11/20/2019  Danielle Cruz is a 88 y.o., female.    Anesthesia Evaluation    I have reviewed the Patient Summary Reports.     I have reviewed the Medications.     Review of Systems  Anesthesia Hx:  No problems with previous Anesthesia  History of prior surgery of interest to airway management or planning: Denies Family Hx of Anesthesia complications.   Denies Personal Hx of Anesthesia complications.   Social:  Former Smoker    Hematology/Oncology:  Hematology Normal   Oncology Normal     Cardiovascular:   Hypertension CAD  CABG/stent  CHF hyperlipidemia Echo 10/19  CONCLUSIONS     1 - Normal left ventricular systolic function (EF 55-60%).     2 - Impaired LV relaxation, elevated LAP (grade 2 diastolic dysfunction).     3 - Normal right ventricular systolic function .     4 - No wall motion abnormalities.     5 - The estimated PA systolic pressure is greater than 37 mmHg.     6 - Mild aortic stenosis, ARMANDO = 1.73 cm2, peak velocity = 1.84 m/s, mean gradient = 5 mmHg.     7 - The mitral valve is mildly sclerotic with mildly restricted leaflet mobility.     8 - Mild tricuspid regurgitation.    Pulmonary:  Pulmonary Normal    Renal/:   Chronic Renal Disease, CRI    Hepatic/GI:   GERD    Musculoskeletal:  Musculoskeletal Normal    Neurological:   CVA    Endocrine:   Hypothyroidism    Psych:   Psychiatric History depression          Physical Exam  General:  Cachexia    Airway/Jaw/Neck:  Airway Findings: Mouth Opening: Normal Tongue: Normal  General Airway Assessment: Adult  Mallampati: II  TM Distance: Normal, at least 6 cm          Chest/Lungs:  Chest/Lungs Findings: Normal Respiratory Rate     Heart/Vascular:  Heart Findings: Rate: Normal             Anesthesia Plan  Type of Anesthesia, risks & benefits discussed:  Anesthesia Type:  MAC  Patient's Preference:   Intra-op Monitoring Plan: standard ASA  monitors  Intra-op Monitoring Plan Comments:   Post Op Pain Control Plan:   Post Op Pain Control Plan Comments:   Induction:   IV  Beta Blocker:  Patient is not currently on a Beta-Blocker (No further documentation required).       Informed Consent: Patient understands risks and agrees with Anesthesia plan.  Questions answered. Anesthesia consent signed with patient.  ASA Score: 3     Day of Surgery Review of History & Physical:    H&P update referred to the surgeon.         Ready For Surgery From Anesthesia Perspective.

## 2019-11-20 NOTE — OR NURSING
Peg to LUQ site cleaned and secured w/gauze and paper tape. Janel @ 4, triple abx ointment and 2x2 sponge placed per MD

## 2019-11-21 PROBLEM — L89.153 PRESSURE INJURY OF SACRAL REGION, STAGE 3: Status: ACTIVE | Noted: 2019-11-21

## 2019-11-21 LAB
ALBUMIN SERPL BCP-MCNC: 2.9 G/DL (ref 3.5–5.2)
ALP SERPL-CCNC: 94 U/L (ref 55–135)
ALT SERPL W/O P-5'-P-CCNC: 20 U/L (ref 10–44)
ANION GAP SERPL CALC-SCNC: 12 MMOL/L (ref 8–16)
AST SERPL-CCNC: 25 U/L (ref 10–40)
BASOPHILS # BLD AUTO: 0.03 K/UL (ref 0–0.2)
BASOPHILS NFR BLD: 0.3 % (ref 0–1.9)
BILIRUB SERPL-MCNC: 0.8 MG/DL (ref 0.1–1)
BUN SERPL-MCNC: 12 MG/DL (ref 8–23)
BURR CELLS BLD QL SMEAR: ABNORMAL
CALCIUM SERPL-MCNC: 9.1 MG/DL (ref 8.7–10.5)
CHLORIDE SERPL-SCNC: 98 MMOL/L (ref 95–110)
CO2 SERPL-SCNC: 25 MMOL/L (ref 23–29)
CREAT SERPL-MCNC: 1.2 MG/DL (ref 0.5–1.4)
DIFFERENTIAL METHOD: ABNORMAL
EOSINOPHIL # BLD AUTO: 0 K/UL (ref 0–0.5)
EOSINOPHIL NFR BLD: 0.1 % (ref 0–8)
ERYTHROCYTE [DISTWIDTH] IN BLOOD BY AUTOMATED COUNT: 13.4 % (ref 11.5–14.5)
EST. GFR  (AFRICAN AMERICAN): 47 ML/MIN/1.73 M^2
EST. GFR  (NON AFRICAN AMERICAN): 40 ML/MIN/1.73 M^2
GLUCOSE SERPL-MCNC: 84 MG/DL (ref 70–110)
HCT VFR BLD AUTO: 36.7 % (ref 37–48.5)
HGB BLD-MCNC: 11.9 G/DL (ref 12–16)
IMM GRANULOCYTES # BLD AUTO: 0.05 K/UL (ref 0–0.04)
IMM GRANULOCYTES NFR BLD AUTO: 0.5 % (ref 0–0.5)
LYMPHOCYTES # BLD AUTO: 1.3 K/UL (ref 1–4.8)
LYMPHOCYTES NFR BLD: 12.9 % (ref 18–48)
MAGNESIUM SERPL-MCNC: 1.8 MG/DL (ref 1.6–2.6)
MCH RBC QN AUTO: 31.1 PG (ref 27–31)
MCHC RBC AUTO-ENTMCNC: 32.4 G/DL (ref 32–36)
MCV RBC AUTO: 96 FL (ref 82–98)
MONOCYTES # BLD AUTO: 0.8 K/UL (ref 0.3–1)
MONOCYTES NFR BLD: 8.1 % (ref 4–15)
NEUTROPHILS # BLD AUTO: 8.1 K/UL (ref 1.8–7.7)
NEUTROPHILS NFR BLD: 78.1 % (ref 38–73)
NRBC BLD-RTO: 0 /100 WBC
OVALOCYTES BLD QL SMEAR: ABNORMAL
PLATELET # BLD AUTO: 239 K/UL (ref 150–350)
PMV BLD AUTO: 10.3 FL (ref 9.2–12.9)
POIKILOCYTOSIS BLD QL SMEAR: SLIGHT
POTASSIUM SERPL-SCNC: 3.7 MMOL/L (ref 3.5–5.1)
PROT SERPL-MCNC: 6.1 G/DL (ref 6–8.4)
RBC # BLD AUTO: 3.83 M/UL (ref 4–5.4)
SODIUM SERPL-SCNC: 135 MMOL/L (ref 136–145)
WBC # BLD AUTO: 10.39 K/UL (ref 3.9–12.7)

## 2019-11-21 PROCEDURE — 99213 PR OFFICE/OUTPT VISIT, EST, LEVL III, 20-29 MIN: ICD-10-PCS | Mod: ,,, | Performed by: PHYSICIAN ASSISTANT

## 2019-11-21 PROCEDURE — 97162 PT EVAL MOD COMPLEX 30 MIN: CPT

## 2019-11-21 PROCEDURE — 63600175 PHARM REV CODE 636 W HCPCS: Performed by: NURSE PRACTITIONER

## 2019-11-21 PROCEDURE — 36415 COLL VENOUS BLD VENIPUNCTURE: CPT

## 2019-11-21 PROCEDURE — 80053 COMPREHEN METABOLIC PANEL: CPT

## 2019-11-21 PROCEDURE — 83735 ASSAY OF MAGNESIUM: CPT

## 2019-11-21 PROCEDURE — 97116 GAIT TRAINING THERAPY: CPT

## 2019-11-21 PROCEDURE — 25000003 PHARM REV CODE 250: Performed by: NURSE PRACTITIONER

## 2019-11-21 PROCEDURE — 99213 OFFICE O/P EST LOW 20 MIN: CPT | Mod: ,,, | Performed by: PHYSICIAN ASSISTANT

## 2019-11-21 PROCEDURE — 85025 COMPLETE CBC W/AUTO DIFF WBC: CPT

## 2019-11-21 RX ORDER — HYDRALAZINE HYDROCHLORIDE 25 MG/1
25 TABLET, FILM COATED ORAL DAILY
Status: DISCONTINUED | OUTPATIENT
Start: 2019-11-21 | End: 2019-11-22 | Stop reason: HOSPADM

## 2019-11-21 RX ORDER — LEVOTHYROXINE SODIUM 25 UG/1
25 TABLET ORAL
Status: DISCONTINUED | OUTPATIENT
Start: 2019-11-21 | End: 2019-11-22 | Stop reason: HOSPADM

## 2019-11-21 RX ORDER — NAPROXEN SODIUM 220 MG/1
81 TABLET, FILM COATED ORAL DAILY
Status: DISCONTINUED | OUTPATIENT
Start: 2019-11-21 | End: 2019-11-22 | Stop reason: HOSPADM

## 2019-11-21 RX ORDER — GUAIFENESIN 100 MG/5ML
200 SOLUTION ORAL EVERY 6 HOURS
Status: DISCONTINUED | OUTPATIENT
Start: 2019-11-21 | End: 2019-11-22 | Stop reason: HOSPADM

## 2019-11-21 RX ORDER — MORPHINE SULFATE 2 MG/ML
2 INJECTION, SOLUTION INTRAMUSCULAR; INTRAVENOUS ONCE
Status: COMPLETED | OUTPATIENT
Start: 2019-11-22 | End: 2019-11-21

## 2019-11-21 RX ORDER — TRAMADOL HYDROCHLORIDE 50 MG/1
50 TABLET ORAL EVERY 6 HOURS PRN
Status: DISCONTINUED | OUTPATIENT
Start: 2019-11-21 | End: 2019-11-22 | Stop reason: HOSPADM

## 2019-11-21 RX ORDER — PANTOPRAZOLE SODIUM 40 MG/1
40 FOR SUSPENSION ORAL 2 TIMES DAILY
Status: DISCONTINUED | OUTPATIENT
Start: 2019-11-21 | End: 2019-11-22 | Stop reason: HOSPADM

## 2019-11-21 RX ORDER — ATORVASTATIN CALCIUM 40 MG/1
40 TABLET, FILM COATED ORAL NIGHTLY
Status: DISCONTINUED | OUTPATIENT
Start: 2019-11-21 | End: 2019-11-22 | Stop reason: HOSPADM

## 2019-11-21 RX ORDER — FUROSEMIDE 40 MG/1
40 TABLET ORAL DAILY PRN
Status: DISCONTINUED | OUTPATIENT
Start: 2019-11-21 | End: 2019-11-22 | Stop reason: HOSPADM

## 2019-11-21 RX ORDER — AMLODIPINE BESYLATE 5 MG/1
5 TABLET ORAL DAILY
Status: DISCONTINUED | OUTPATIENT
Start: 2019-11-21 | End: 2019-11-22 | Stop reason: HOSPADM

## 2019-11-21 RX ORDER — FUROSEMIDE 10 MG/ML
20 INJECTION INTRAMUSCULAR; INTRAVENOUS ONCE
Status: COMPLETED | OUTPATIENT
Start: 2019-11-21 | End: 2019-11-21

## 2019-11-21 RX ADMIN — AMLODIPINE BESYLATE 5 MG: 5 TABLET ORAL at 12:11

## 2019-11-21 RX ADMIN — SODIUM CHLORIDE: 0.9 INJECTION, SOLUTION INTRAVENOUS at 09:11

## 2019-11-21 RX ADMIN — PANTOPRAZOLE SODIUM 40 MG: 40 GRANULE, DELAYED RELEASE ORAL at 08:11

## 2019-11-21 RX ADMIN — FOLIC ACID: 5 INJECTION, SOLUTION INTRAMUSCULAR; INTRAVENOUS; SUBCUTANEOUS at 10:11

## 2019-11-21 RX ADMIN — LEVOTHYROXINE SODIUM 25 MCG: 25 TABLET ORAL at 12:11

## 2019-11-21 RX ADMIN — HYDRALAZINE HYDROCHLORIDE 25 MG: 25 TABLET, FILM COATED ORAL at 03:11

## 2019-11-21 RX ADMIN — MORPHINE SULFATE 4 MG: 4 INJECTION INTRAVENOUS at 05:11

## 2019-11-21 RX ADMIN — GUAIFENESIN 200 MG: 200 SOLUTION ORAL at 03:11

## 2019-11-21 RX ADMIN — TRAMADOL HYDROCHLORIDE 50 MG: 50 TABLET, FILM COATED ORAL at 11:11

## 2019-11-21 RX ADMIN — TRAMADOL HYDROCHLORIDE 50 MG: 50 TABLET, FILM COATED ORAL at 08:11

## 2019-11-21 RX ADMIN — FUROSEMIDE 20 MG: 10 INJECTION, SOLUTION INTRAMUSCULAR; INTRAVENOUS at 06:11

## 2019-11-21 RX ADMIN — MORPHINE SULFATE 2 MG: 2 INJECTION, SOLUTION INTRAMUSCULAR; INTRAVENOUS at 11:11

## 2019-11-21 RX ADMIN — GUAIFENESIN 200 MG: 200 SOLUTION ORAL at 11:11

## 2019-11-21 RX ADMIN — ASPIRIN 81 MG CHEWABLE TABLET 81 MG: 81 TABLET CHEWABLE at 03:11

## 2019-11-21 RX ADMIN — ATORVASTATIN CALCIUM 40 MG: 40 TABLET, FILM COATED ORAL at 08:11

## 2019-11-21 RX ADMIN — MORPHINE SULFATE 4 MG: 4 INJECTION INTRAVENOUS at 01:11

## 2019-11-21 RX ADMIN — PANTOPRAZOLE SODIUM 40 MG: 40 GRANULE, DELAYED RELEASE ORAL at 03:11

## 2019-11-21 NOTE — PLAN OF CARE
Patient arrived on med surg post PEG tube placement. Patient awake, alert and oriented x4, she is continent but has brief on. She can tell when she has to go to the bathroom. She has family at the bedside that is involved in her care. She was experiencing pain upon arriving to the floor, but no orders were entered for pain. I secure messaged Dr Joiner and Dr torres in regards to her pain medication and neither responded, evidently I was contacting the wrong physicians. After making the charge nurse aware of the non response, we found the correct PA and pain medication was ordered and administered. Patient's PEG site is covered with 4x4 gauze and tape and is clean, dry and intact.

## 2019-11-21 NOTE — ASSESSMENT & PLAN NOTE
S/p PEG 10/20/19  -Nutritional consult for tube feeding: isosource 1.5 5 cans daily with water  -GI consult: signed off  -started tube feedings this afternoon  -home with HH tomorrow

## 2019-11-21 NOTE — PROGRESS NOTES
S/p PEG placement for dysphagia. Dressing removed and site inspected. No abnormal bleeding. +BS and no tenderness. Ok to flush and if no issues, start feeds. Recommend dietary consult for feeding recommendations. HM to review meds for PEG use. Patient already has home health services. GI signing off. Re-consult as needed.   Maxwell Alvarado PA-C

## 2019-11-21 NOTE — SUBJECTIVE & OBJECTIVE
Past Medical History:   Diagnosis Date    CHF (congestive heart failure)     Coronary artery disease of native artery of native heart with stable angina pectoris 11/5/2019    CVA (cerebral vascular accident)     Depression     GERD (gastroesophageal reflux disease)     Hyperlipemia     Hypertension     Hypothyroidism     Shingles        Past Surgical History:   Procedure Laterality Date    CHOLECYSTECTOMY      CORONARY ARTERY BYPASS GRAFT      ESOPHAGOGASTRODUODENOSCOPY N/A 11/15/2019    Procedure: ESOPHAGOGASTRODUODENOSCOPY (EGD);  Surgeon: Renard Lopez III, MD;  Location: [facility];  Service: Endoscopy;  Laterality: N/A;    KNEE SURGERY         Review of patient's allergies indicates:   Allergen Reactions    Lisinopril Blisters       No current facility-administered medications on file prior to encounter.      Current Outpatient Medications on File Prior to Encounter   Medication Sig    aluminum-magnesium hydroxide-simethicone (MAALOX) 200-200-20 mg/5 mL Susp Take 30 mLs by mouth every 6 (six) hours as needed.    amLODIPine (NORVASC) 5 MG tablet Take 1 tablet (5 mg total) by mouth once daily.    aspirin (ECOTRIN) 81 MG EC tablet Take 81 mg by mouth once daily.    atorvastatin (LIPITOR) 40 MG tablet Take 1 tablet (40 mg total) by mouth nightly.    famotidine (PEPCID) 20 MG tablet Take 1 tablet (20 mg total) by mouth 2 (two) times daily.    furosemide (LASIX) 40 MG tablet Take 1 tablet (40 mg total) by mouth daily as needed (For leg swelling and weight gain 3 lbs).    hydrALAZINE (APRESOLINE) 25 MG tablet Take 25 mg by mouth once daily.    levothyroxine (SYNTHROID) 25 MCG tablet Take 25 mcg by mouth once daily.    ondansetron (ZOFRAN-ODT) 4 MG TbDL     pantoprazole (PROTONIX) 40 MG tablet Take 1 tablet (40 mg total) by mouth 2 (two) times daily.    traMADol (ULTRAM) 50 mg tablet Take 1 tablet (50 mg total) by mouth every 8 (eight) hours as needed for Pain.     Family History     None         Tobacco Use    Smoking status: Former Smoker    Smokeless tobacco: Never Used   Substance and Sexual Activity    Alcohol use: Never     Frequency: Never    Drug use: Not Currently    Sexual activity: Not on file     Review of Systems   Constitutional: Negative.  Negative for appetite change, chills, fatigue and fever.   HENT: Negative.  Negative for congestion, ear discharge, facial swelling, sore throat and trouble swallowing.    Eyes: Negative.  Negative for photophobia, pain, discharge, redness and visual disturbance.   Respiratory: Negative.  Negative for cough, chest tightness, shortness of breath, wheezing and stridor.    Cardiovascular: Negative.  Negative for chest pain, palpitations and leg swelling.   Gastrointestinal: Negative for abdominal distention, abdominal pain, anal bleeding, blood in stool, constipation, diarrhea, nausea, rectal pain and vomiting.        Pain to left upper abdomen where PEG placed   Endocrine: Negative.  Negative for cold intolerance, heat intolerance, polydipsia, polyphagia and polyuria.   Genitourinary: Negative.  Negative for difficulty urinating, dysuria, flank pain, frequency, hematuria, pelvic pain, urgency, vaginal bleeding and vaginal discharge.   Musculoskeletal: Negative.  Negative for arthralgias, back pain, gait problem, joint swelling, myalgias and neck pain.   Skin: Negative for color change, pallor, rash and wound.   Allergic/Immunologic: Negative.  Negative for food allergies and immunocompromised state.   Neurological: Negative for dizziness, tremors, seizures, syncope, facial asymmetry, speech difficulty, weakness, light-headedness, numbness and headaches.   Hematological: Negative.  Negative for adenopathy. Does not bruise/bleed easily.   Psychiatric/Behavioral: Negative.  Negative for agitation, confusion, hallucinations, sleep disturbance and suicidal ideas. The patient is not nervous/anxious.    All other systems reviewed and are  negative.    Objective:     Vital Signs (Most Recent):  Temp: 97.4 °F (36.3 °C) (11/20/19 1600)  Pulse: 80 (11/20/19 1600)  Resp: 18 (11/20/19 1600)  BP: (!) 179/75 (11/20/19 1600)  SpO2: 96 % (11/20/19 1600) Vital Signs (24h Range):  Temp:  [97.3 °F (36.3 °C)-98.1 °F (36.7 °C)] 97.4 °F (36.3 °C)  Pulse:  [70-80] 80  Resp:  [18-20] 18  SpO2:  [95 %-97 %] 96 %  BP: (125-179)/(56-76) 179/75        There is no height or weight on file to calculate BMI.    Physical Exam   Constitutional: She is oriented to person, place, and time. She appears well-developed. No distress.   Thin, frail, cachectic   HENT:   Head: Normocephalic and atraumatic.   Nose: Nose normal.   Eyes: Pupils are equal, round, and reactive to light. Conjunctivae and EOM are normal. Right eye exhibits no discharge. Left eye exhibits no discharge.   Neck: Normal range of motion. Neck supple. No JVD present. No tracheal deviation present. No thyromegaly present.   Cardiovascular: Normal rate, regular rhythm and normal heart sounds. Exam reveals no gallop and no friction rub.   No murmur heard.  Pulmonary/Chest: Effort normal and breath sounds normal. No stridor. No respiratory distress. She has no wheezes. She has no rales. She exhibits no tenderness.   Abdominal: Soft. Bowel sounds are normal. She exhibits no distension and no mass. There is tenderness (PEG placement site LUQ). There is no guarding.   Musculoskeletal: Normal range of motion. She exhibits no edema, tenderness or deformity.   Lymphadenopathy:     She has no cervical adenopathy.   Neurological: She is alert and oriented to person, place, and time. She has normal reflexes. No cranial nerve deficit. Coordination normal.   Skin: Skin is warm and dry. No rash noted. She is not diaphoretic. No erythema. No pallor.   Psychiatric: She has a normal mood and affect. Her behavior is normal. Judgment and thought content normal.   Nursing note and vitals reviewed.        CRANIAL NERVES     CN III, IV,  VI   Pupils are equal, round, and reactive to light.  Extraocular motions are normal.      Significant Labs: CBC: No results for input(s): WBC, HGB, HCT, PLT in the last 48 hours.  CMP: No results for input(s): NA, K, CL, CO2, GLU, BUN, CREATININE, CALCIUM, PROT, ALBUMIN, BILITOT, ALKPHOS, AST, ALT, ANIONGAP, EGFRNONAA in the last 48 hours.    Invalid input(s): ESTGFAFRICA    Significant Imaging: I have reviewed all pertinent imaging results/findings within the past 24 hours.

## 2019-11-21 NOTE — PLAN OF CARE
No acute distress noted. IV fluids infusing. NPO. Drsg dry and intact. Safety measures are in place. Call bell within reach. Pain being managed. Pt free of falls. Will continue to monitor. Chart check complete.

## 2019-11-21 NOTE — PLAN OF CARE
Recommendation:   1. If continuous feeding is desired, Isosource 1.5 @ goal rate of 45mL/hr to provide 1620 kcals, 73 g pro and 825 mL enteral water; begin at 10-20 mL and advance by 10-20 mL as tolerated by patient; if flush is desired, 200 mL water flush q6hrs     2. If bolus feeding is desired, Isosource 1.5 @ 5 cartons/day to provide 1875 kcals, 85 g pro, and 955 mL enteral water; flush before and after each feeding    Goals: Pt to be administered TF by f/u  Nutrition Goal Status: new  Nutrition Discharge Planning: PEG

## 2019-11-21 NOTE — PROGRESS NOTES
"[facility] Hospital Medicine  Progress Note    Patient Name: Danielle Cruz  MRN: [id number]  Patient Class: OP- Outpatient Recovery   Admission Date: 11/20/2019  Length of Stay: 0 days  Attending Physician: Tanner Carbone, *  Primary Care Provider: Jan Qureshi MD        Subjective:     Principal Problem:Dysphagia  HPI:  Danielle Cruz is an 89 yo female patient with PMHx of HTN, hypothyroidism, HLP, spinal compression fractures, esophageal stricture, who had an elective PEG tube placemen today with DR. Lopez. 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 Family stated she had a speech evaluation which showed she was at high risk for aspiration. GI Clinic NP stated she was unable to handle either liquids or solids, but family has been giving her pills. She has had several health setbacks in the past 6 months: spinal compression fracture, shingles, Cholecystectomy. She recently moved to  from [place] where she lived 20 years. PEG tube placed today.  She is extended recovery for PEG placement.    Overview/Hospital Course:  She continued to c/o abdominal pain, but physical exam revealed no tenderness. Later during the day she c/o "fullness in the throat like food was stuck." c/o SOB, chest Xray showed mild fluid buildup. IV Lasix 20 mg ordered in addition to Lasix 40 mg po this am. Robitussin and chloraseptic spray ordered. No food in throat as she had EGD and has been NPO except ice chips. Started tube feedings today, will reassess in am.     Interval History: SOB, chest xray showed mild fluid, will monitor. Will give IV Lasix x 1    Review of Systems   Constitutional: Positive for activity change and fatigue. Negative for appetite change, chills and fever.   HENT: Negative.  Negative for congestion, ear discharge, facial swelling, sore throat and trouble swallowing.    Eyes: Negative.  Negative for photophobia, " pain, discharge, redness and visual disturbance.   Respiratory: Positive for shortness of breath. Negative for cough, chest tightness, wheezing and stridor.    Cardiovascular: Negative.  Negative for chest pain, palpitations and leg swelling.   Gastrointestinal: Positive for abdominal pain (PEG site). Negative for abdominal distention, anal bleeding, blood in stool, constipation, diarrhea, nausea, rectal pain and vomiting.   Endocrine: Negative.  Negative for cold intolerance, heat intolerance, polydipsia, polyphagia and polyuria.   Genitourinary: Negative.  Negative for difficulty urinating, dysuria, flank pain, frequency, hematuria, pelvic pain, urgency, vaginal bleeding and vaginal discharge.   Musculoskeletal: Negative.  Negative for arthralgias, back pain, gait problem, joint swelling, myalgias and neck pain.   Skin: Negative for color change, pallor, rash and wound.   Allergic/Immunologic: Negative.  Negative for food allergies and immunocompromised state.   Neurological: Positive for weakness. Negative for dizziness, tremors, seizures, syncope, facial asymmetry, speech difficulty, light-headedness, numbness and headaches.   Hematological: Negative.  Negative for adenopathy. Does not bruise/bleed easily.   Psychiatric/Behavioral: Negative.  Negative for agitation, confusion, hallucinations, sleep disturbance and suicidal ideas. The patient is not nervous/anxious.    All other systems reviewed and are negative.    Objective:     Vital Signs (Most Recent):  Temp: 97.9 °F (36.6 °C) (11/21/19 0757)  Pulse: 89 (11/21/19 0757)  Resp: 18 (11/21/19 0757)  BP: (!) 145/69 (11/21/19 0757)  SpO2: 95 % (11/21/19 0757) Vital Signs (24h Range):  Temp:  [97.9 °F (36.6 °C)-99 °F (37.2 °C)] 97.9 °F (36.6 °C)  Pulse:  [] 89  Resp:  [18-20] 18  SpO2:  [92 %-95 %] 95 %  BP: (145-193)/(65-89) 145/69     Weight: 60.8 kg (134 lb)  Body mass index is 25.32 kg/m².    Intake/Output Summary (Last 24 hours) at 11/21/2019 1721  Last  data filed at 11/21/2019 0839  Gross per 24 hour   Intake --   Output 100 ml   Net -100 ml      Physical Exam   Constitutional: She is oriented to person, place, and time. She appears well-developed and well-nourished. No distress.   HENT:   Head: Normocephalic and atraumatic.   Nose: Nose normal.   Eyes: Pupils are equal, round, and reactive to light. Conjunctivae and EOM are normal. Right eye exhibits no discharge. Left eye exhibits no discharge.   Neck: Normal range of motion. Neck supple. No JVD present. No tracheal deviation present. No thyromegaly present.   Cardiovascular: Normal rate, regular rhythm and normal heart sounds. Exam reveals no gallop and no friction rub.   No murmur heard.  Pulmonary/Chest: Effort normal. No stridor. No respiratory distress. She has no wheezes. She has rales. She exhibits no tenderness.   Abdominal: Soft. Bowel sounds are normal. She exhibits no distension and no mass. There is no tenderness. There is no guarding.   Musculoskeletal: Normal range of motion. She exhibits edema (trace). She exhibits no tenderness or deformity.   Lymphadenopathy:     She has no cervical adenopathy.   Neurological: She is alert and oriented to person, place, and time. She has normal reflexes. No cranial nerve deficit. Coordination normal.   Skin: Skin is warm and dry. No rash noted. She is not diaphoretic. No erythema. No pallor.   Psychiatric: She has a normal mood and affect. Her behavior is normal. Judgment and thought content normal.   Nursing note and vitals reviewed.      Significant Labs:   CBC:   Recent Labs   Lab 11/21/19  0456   WBC 10.39   HGB 11.9*   HCT 36.7*        CMP:   Recent Labs   Lab 11/21/19  0456   *   K 3.7   CL 98   CO2 25   GLU 84   BUN 12   CREATININE 1.2   CALCIUM 9.1   PROT 6.1   ALBUMIN 2.9*   BILITOT 0.8   ALKPHOS 94   AST 25   ALT 20   ANIONGAP 12   EGFRNONAA 40*       Significant Imaging:           Assessment/Plan:      * Dysphagia  S/p PEG  10/20/19  -Nutritional consult for tube feeding: isosource 1.5 5 cans daily with water  -GI consult: signed off  -started tube feedings this afternoon  -home with HH tomorrow    Pressure injury of sacral region, stage 3  -wound care nurse consult      S/P percutaneous endoscopic gastrostomy (PEG) tube placement  11/20/19 - tube working well.      Hyperlipemia  Resume home meds per PEG      Hypertension  -resume home meds per PEG      VTE Risk Mitigation (From admission, onward)    None                Magdalena Walton NP  Department of Hospital Medicine   [facility]

## 2019-11-21 NOTE — PT/OT/SLP EVAL
Physical Therapy Evaluation    Patient Name:  Danielle Cruz   MRN:  [id number]    Recommendations:     Discharge Recommendations:  home health PT(24 HOUR CAREGIVER)   Discharge Equipment Recommendations:     Barriers to discharge: None    Assessment:     Danielle Cruz is a 88 y.o. female admitted with a medical diagnosis of Dysphagia.  She presents with the following impairments/functional limitations:  weakness, impaired endurance, impaired balance, gait instability, decreased lower extremity function, pain, impaired functional mobilty, decreased ROM, decreased upper extremity function, impaired self care skills .    Rehab Prognosis: Good; patient would benefit from acute skilled PT services to address these deficits and reach maximum level of function.    Recent Surgery: Procedure(s) (LRB):  INSERTION, PEG TUBE (N/A) 1 Day Post-Op    Plan:     During this hospitalization, patient to be seen   to address the identified rehab impairments via gait training, therapeutic activities, therapeutic exercises and progress toward the following goals:    · Plan of Care Expires:  11/28/19    Subjective     Chief Complaint: ABD PAIN AND WEAKNESS  Patient/Family Comments/goals: INC STRENGTH   Pain/Comfort:  · Pain Rating 1: 4/10  · Location - Side 1: Left  · Location 1: abdomen  · Pain Rating Post-Intervention 1: 4/10    Patients cultural, spiritual, Zoroastrianism conflicts given the current situation:      Living Environment:  PT LIVES AT HOME WITH DAUGHTER AND HAS NO STEPS TO ENTER HOME. PT LIVES IN A ONE STORY HOME.  Prior to admission, patients level of function was MIN A WITH GROSS FUNC MOBILITY LIMITED HH DISTANCES. .  Equipment used at home: walker, rolling, wheelchair, bedside commode, shower chair, grab bar.  DME owned (not currently used): rolling walker.  Upon discharge, patient will have assistance from FAMILY.    Objective:     Communicated with NURSE AND Epic CHART REVIEW prior to session.  Patient  found supine with peripheral IV, PEG Tube  upon PT entry to room.    General Precautions: Standard, fall   Orthopedic Precautions:N/A   Braces: N/A     Exams:  · RLE ROM: LIMITED  · RLE Strength: LIMITED  · LLE ROM: LIMITED  · LLE Strength: LIMITED    Functional Mobility:  PT MET IN RM SUP>SIT EOB WITH MIN A AND INC TIME. PT SCOOTED TO EOB WITH MIN A. PT STOOD WITH RW AND FF POSTURE WITH MIN A. PT T/F TO CHAIR WITH MIN A. PT SEATED IN CHAIR AND COMPLETED B LE TE X 5 REPS OF AP, TKE, AND MIP WITH LIMITED ROM. PT EDUCATED ON ROLE OF P.T. AND TO CALL FOR NURSE ASSIST TO GET BACK TO BED. PT AND FAMILY AGREED.     AM-PAC 6 CLICK MOBILITY  Total Score:14     Patient left up in chair with call button in reach and SON present.    GOALS:   Multidisciplinary Problems     Physical Therapy Goals        Problem: Physical Therapy Goal    Goal Priority Disciplines Outcome Goal Variances Interventions   Physical Therapy Goal     PT, PT/OT      Description:  PT WILL BE SEEN FOR P.T. FOR A MIN OF 5 OUT OF 7 DAYS A WEEK  LT19  1. PT WILL COMPLETE BED MOBILITY MOD I  2. PT WILL T/F TO CHAIR WITH AND SBA  3. PT WILL GT TRAIN X 25' WITH MIN A  4. PT WILL COMPLETE B LE TE X 15 REPS FOR STRENGTHENING.                     History:     Past Medical History:   Diagnosis Date    CHF (congestive heart failure)     Coronary artery disease of native artery of native heart with stable angina pectoris 2019    CVA (cerebral vascular accident)     Depression     GERD (gastroesophageal reflux disease)     Hyperlipemia     Hypertension     Hypothyroidism     Shingles        Past Surgical History:   Procedure Laterality Date    CHOLECYSTECTOMY      CORONARY ARTERY BYPASS GRAFT      ESOPHAGOGASTRODUODENOSCOPY N/A 11/15/2019    Procedure: ESOPHAGOGASTRODUODENOSCOPY (EGD);  Surgeon: Renard Lopez III, MD;  Location: [facility];  Service: Endoscopy;  Laterality: N/A;    KNEE SURGERY         Time Tracking:     PT Received On:  11/21/19  PT Start Time: 0950     PT Stop Time: 1015  PT Total Time (min): 25 min     Billable Minutes: Evaluation 15 and Therapeutic Activity 10      Kristina Redd, PT  11/21/2019

## 2019-11-21 NOTE — SUBJECTIVE & OBJECTIVE
Interval History: SOB, chest xray showed mild fluid, will monitor. Will give IV Lasix x 1    Review of Systems   Constitutional: Positive for activity change and fatigue. Negative for appetite change, chills and fever.   HENT: Negative.  Negative for congestion, ear discharge, facial swelling, sore throat and trouble swallowing.    Eyes: Negative.  Negative for photophobia, pain, discharge, redness and visual disturbance.   Respiratory: Positive for shortness of breath. Negative for cough, chest tightness, wheezing and stridor.    Cardiovascular: Negative.  Negative for chest pain, palpitations and leg swelling.   Gastrointestinal: Positive for abdominal pain (PEG site). Negative for abdominal distention, anal bleeding, blood in stool, constipation, diarrhea, nausea, rectal pain and vomiting.   Endocrine: Negative.  Negative for cold intolerance, heat intolerance, polydipsia, polyphagia and polyuria.   Genitourinary: Negative.  Negative for difficulty urinating, dysuria, flank pain, frequency, hematuria, pelvic pain, urgency, vaginal bleeding and vaginal discharge.   Musculoskeletal: Negative.  Negative for arthralgias, back pain, gait problem, joint swelling, myalgias and neck pain.   Skin: Negative for color change, pallor, rash and wound.   Allergic/Immunologic: Negative.  Negative for food allergies and immunocompromised state.   Neurological: Positive for weakness. Negative for dizziness, tremors, seizures, syncope, facial asymmetry, speech difficulty, light-headedness, numbness and headaches.   Hematological: Negative.  Negative for adenopathy. Does not bruise/bleed easily.   Psychiatric/Behavioral: Negative.  Negative for agitation, confusion, hallucinations, sleep disturbance and suicidal ideas. The patient is not nervous/anxious.    All other systems reviewed and are negative.    Objective:     Vital Signs (Most Recent):  Temp: 97.9 °F (36.6 °C) (11/21/19 0757)  Pulse: 89 (11/21/19 0757)  Resp: 18 (11/21/19  0757)  BP: (!) 145/69 (11/21/19 0757)  SpO2: 95 % (11/21/19 0757) Vital Signs (24h Range):  Temp:  [97.9 °F (36.6 °C)-99 °F (37.2 °C)] 97.9 °F (36.6 °C)  Pulse:  [] 89  Resp:  [18-20] 18  SpO2:  [92 %-95 %] 95 %  BP: (145-193)/(65-89) 145/69     Weight: 60.8 kg (134 lb)  Body mass index is 25.32 kg/m².    Intake/Output Summary (Last 24 hours) at 11/21/2019 1721  Last data filed at 11/21/2019 0840  Gross per 24 hour   Intake --   Output 100 ml   Net -100 ml      Physical Exam   Constitutional: She is oriented to person, place, and time. She appears well-developed and well-nourished. No distress.   HENT:   Head: Normocephalic and atraumatic.   Nose: Nose normal.   Eyes: Pupils are equal, round, and reactive to light. Conjunctivae and EOM are normal. Right eye exhibits no discharge. Left eye exhibits no discharge.   Neck: Normal range of motion. Neck supple. No JVD present. No tracheal deviation present. No thyromegaly present.   Cardiovascular: Normal rate, regular rhythm and normal heart sounds. Exam reveals no gallop and no friction rub.   No murmur heard.  Pulmonary/Chest: Effort normal. No stridor. No respiratory distress. She has no wheezes. She has rales. She exhibits no tenderness.   Abdominal: Soft. Bowel sounds are normal. She exhibits no distension and no mass. There is no tenderness. There is no guarding.   Musculoskeletal: Normal range of motion. She exhibits edema (trace). She exhibits no tenderness or deformity.   Lymphadenopathy:     She has no cervical adenopathy.   Neurological: She is alert and oriented to person, place, and time. She has normal reflexes. No cranial nerve deficit. Coordination normal.   Skin: Skin is warm and dry. No rash noted. She is not diaphoretic. No erythema. No pallor.   Psychiatric: She has a normal mood and affect. Her behavior is normal. Judgment and thought content normal.   Nursing note and vitals reviewed.      Significant Labs:   CBC:   Recent Labs   Lab  11/21/19  0456   WBC 10.39   HGB 11.9*   HCT 36.7*        CMP:   Recent Labs   Lab 11/21/19  0456   *   K 3.7   CL 98   CO2 25   GLU 84   BUN 12   CREATININE 1.2   CALCIUM 9.1   PROT 6.1   ALBUMIN 2.9*   BILITOT 0.8   ALKPHOS 94   AST 25   ALT 20   ANIONGAP 12   EGFRNONAA 40*       Significant Imaging:

## 2019-11-21 NOTE — CONSULTS
11/21/19 1030   Skin   Skin WDL ex   Skin Temperature warm   Skin Moisture dry   Skin Integrity pressure injury   Domingo Risk Assessment   Sensory Perception 3-->slightly limited   Moisture 3-->occasionally moist   Activity 3-->walks occasionally   Mobility 3-->slightly limited   Nutrition 2-->probably inadequate   Friction and Shear 2-->potential problem   Domingo Score 16        Pressure Injury 10/29/19 1452 midline Sacral spine Stage 3   Date First Assessed/Time First Assessed: 10/29/19 1452   Pressure Injury Present on Admission: yes  Orientation: midline  Location: Sacral spine  Staging: Stage 3   Staging Stage 3   Dressing Appearance Intact   Drainage Amount Scant   Drainage Characteristics/Odor Serous   Appearance Yellow;Slough   Tissue loss description Full thickness   Yellow (%), Wound Tissue Color 100 %   Wound Edges Open   Wound Length (cm) 1 cm   Wound Width (cm) 1 cm   Wound Depth (cm) 0.1 cm   Wound Volume (cm^3) 0.1 cm^3   Wound Surface Area (cm^2) 1 cm^2   Care Cleansed with:;Sterile normal saline   Dressing Hydrocolloid  (mesalt)   Periwound Care Skin barrier film applied   Dressing Change Due 11/26/19         Consulted to this 88 year old female today for skin breakdown to her sacrum and abdomen. She has a PMHx of CAD s/p CABG (2005), Chronic diastolic CHF, HTN, HLP GERD, currently inpatient with dysphagia- s/p PEG tube placement. She is awake and alert. Skin assessment performed. Patient turned to right side with assist. Stage 3 pressure injury noted to her sacrum measuring 1x1x0.1cm. Wound bed is moist and completely obscured with yellow/tan slough. Some chronic, dark discoloration noted to sacrum and bilateral buttocks. Cleansed with saline and patted dry. Periwound painted with cavilon. Mesalt applied to cover wound bed then a duoderm was placed to cover. Recommend pressure injury prevention measures, see below for recommendations.     Stage 3 to sacrum:  1. Cleanse with saline  2. Pat  dry  3. Paint periwound with cavilon  4. Apply mesalt to wound bed  5. Cover with a hydrocolloid dressing  6. Change every 5 days

## 2019-11-21 NOTE — HPI
Danielle Cruz is an 87 yo female patient with PMHx of HTN, hypothyroidism, HLP, spinal compression fractures, esophageal stricture, who had an elective PEG tube placemen today with DR. Lopez. 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 Family stated she had a speech evaluation which showed she was at high risk for aspiration. GI Clinic NP stated she was unable to handle either liquids or solids, but family has been giving her pills. She has had several health setbacks in the past 6 months: spinal compression fracture, shingles, Cholecystectomy. She recently moved to  from [place] where she lived 20 years. PEG tube placed today. She is extended recovery for PEG placement.

## 2019-11-21 NOTE — H&P
[facility] Hospital Medicine  History & Physical    Patient Name: Danielle Cruz  MRN: [id number]  Admission Date: 11/20/2019  Attending Physician: Tanner Carbone, *   Primary Care Provider: Jan Qureshi MD         Patient information was obtained from patient, spouse/SO, past medical records and ER records.     Subjective:     Principal Problem:Dysphagia    Chief Complaint: No chief complaint on file.       HPI: Danielle Cruz is an 87 yo female patient with PMHx of HTN, hypothyroidism, HLP, spinal compression fractures, esophageal stricture, who had an elective PEG tube placemen today with DR. Lopez. 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 Family stated she had a speech evaluation which showed she was at high risk for aspiration. GI Clinic NP stated she was unable to handle either liquids or solids, but family has been giving her pills. She has had several health setbacks in the past 6 months: spinal compression fracture, shingles, Cholecystectomy. She recently moved to  from [place] where she lived 20 years. PEG tube placed today.  She is extended recovery for PEG placement.    Past Medical History:   Diagnosis Date    CHF (congestive heart failure)     Coronary artery disease of native artery of native heart with stable angina pectoris 11/5/2019    CVA (cerebral vascular accident)     Depression     GERD (gastroesophageal reflux disease)     Hyperlipemia     Hypertension     Hypothyroidism     Shingles        Past Surgical History:   Procedure Laterality Date    CHOLECYSTECTOMY      CORONARY ARTERY BYPASS GRAFT      ESOPHAGOGASTRODUODENOSCOPY N/A 11/15/2019    Procedure: ESOPHAGOGASTRODUODENOSCOPY (EGD);  Surgeon: Renard Lopez III, MD;  Location: [facility];  Service: Endoscopy;  Laterality: N/A;    KNEE SURGERY         Review of patient's allergies indicates:   Allergen Reactions     Lisinopril Blisters       No current facility-administered medications on file prior to encounter.      Current Outpatient Medications on File Prior to Encounter   Medication Sig    aluminum-magnesium hydroxide-simethicone (MAALOX) 200-200-20 mg/5 mL Susp Take 30 mLs by mouth every 6 (six) hours as needed.    amLODIPine (NORVASC) 5 MG tablet Take 1 tablet (5 mg total) by mouth once daily.    aspirin (ECOTRIN) 81 MG EC tablet Take 81 mg by mouth once daily.    atorvastatin (LIPITOR) 40 MG tablet Take 1 tablet (40 mg total) by mouth nightly.    famotidine (PEPCID) 20 MG tablet Take 1 tablet (20 mg total) by mouth 2 (two) times daily.    furosemide (LASIX) 40 MG tablet Take 1 tablet (40 mg total) by mouth daily as needed (For leg swelling and weight gain 3 lbs).    hydrALAZINE (APRESOLINE) 25 MG tablet Take 25 mg by mouth once daily.    levothyroxine (SYNTHROID) 25 MCG tablet Take 25 mcg by mouth once daily.    ondansetron (ZOFRAN-ODT) 4 MG TbDL     pantoprazole (PROTONIX) 40 MG tablet Take 1 tablet (40 mg total) by mouth 2 (two) times daily.    traMADol (ULTRAM) 50 mg tablet Take 1 tablet (50 mg total) by mouth every 8 (eight) hours as needed for Pain.     Family History     None        Tobacco Use    Smoking status: Former Smoker    Smokeless tobacco: Never Used   Substance and Sexual Activity    Alcohol use: Never     Frequency: Never    Drug use: Not Currently    Sexual activity: Not on file     Review of Systems   Constitutional: Negative.  Negative for appetite change, chills, fatigue and fever.   HENT: Negative.  Negative for congestion, ear discharge, facial swelling, sore throat and trouble swallowing.    Eyes: Negative.  Negative for photophobia, pain, discharge, redness and visual disturbance.   Respiratory: Negative.  Negative for cough, chest tightness, shortness of breath, wheezing and stridor.    Cardiovascular: Negative.  Negative for chest pain, palpitations and leg swelling.    Gastrointestinal: Negative for abdominal distention, abdominal pain, anal bleeding, blood in stool, constipation, diarrhea, nausea, rectal pain and vomiting.        Pain to left upper abdomen where PEG placed   Endocrine: Negative.  Negative for cold intolerance, heat intolerance, polydipsia, polyphagia and polyuria.   Genitourinary: Negative.  Negative for difficulty urinating, dysuria, flank pain, frequency, hematuria, pelvic pain, urgency, vaginal bleeding and vaginal discharge.   Musculoskeletal: Negative.  Negative for arthralgias, back pain, gait problem, joint swelling, myalgias and neck pain.   Skin: Negative for color change, pallor, rash and wound.   Allergic/Immunologic: Negative.  Negative for food allergies and immunocompromised state.   Neurological: Negative for dizziness, tremors, seizures, syncope, facial asymmetry, speech difficulty, weakness, light-headedness, numbness and headaches.   Hematological: Negative.  Negative for adenopathy. Does not bruise/bleed easily.   Psychiatric/Behavioral: Negative.  Negative for agitation, confusion, hallucinations, sleep disturbance and suicidal ideas. The patient is not nervous/anxious.    All other systems reviewed and are negative.    Objective:     Vital Signs (Most Recent):  Temp: 97.4 °F (36.3 °C) (11/20/19 1600)  Pulse: 80 (11/20/19 1600)  Resp: 18 (11/20/19 1600)  BP: (!) 179/75 (11/20/19 1600)  SpO2: 96 % (11/20/19 1600) Vital Signs (24h Range):  Temp:  [97.3 °F (36.3 °C)-98.1 °F (36.7 °C)] 97.4 °F (36.3 °C)  Pulse:  [70-80] 80  Resp:  [18-20] 18  SpO2:  [95 %-97 %] 96 %  BP: (125-179)/(56-76) 179/75        There is no height or weight on file to calculate BMI.    Physical Exam   Constitutional: She is oriented to person, place, and time. She appears well-developed. No distress.   Thin, frail, cachectic   HENT:   Head: Normocephalic and atraumatic.   Nose: Nose normal.   Eyes: Pupils are equal, round, and reactive to light. Conjunctivae and EOM are  normal. Right eye exhibits no discharge. Left eye exhibits no discharge.   Neck: Normal range of motion. Neck supple. No JVD present. No tracheal deviation present. No thyromegaly present.   Cardiovascular: Normal rate, regular rhythm and normal heart sounds. Exam reveals no gallop and no friction rub.   No murmur heard.  Pulmonary/Chest: Effort normal and breath sounds normal. No stridor. No respiratory distress. She has no wheezes. She has no rales. She exhibits no tenderness.   Abdominal: Soft. Bowel sounds are normal. She exhibits no distension and no mass. There is tenderness (PEG placement site LUQ). There is no guarding.   Musculoskeletal: Normal range of motion. She exhibits no edema, tenderness or deformity.   Lymphadenopathy:     She has no cervical adenopathy.   Neurological: She is alert and oriented to person, place, and time. She has normal reflexes. No cranial nerve deficit. Coordination normal.   Skin: Skin is warm and dry. No rash noted. She is not diaphoretic. No erythema. No pallor.   Psychiatric: She has a normal mood and affect. Her behavior is normal. Judgment and thought content normal.   Nursing note and vitals reviewed.        CRANIAL NERVES     CN III, IV, VI   Pupils are equal, round, and reactive to light.  Extraocular motions are normal.      Significant Labs: CBC: No results for input(s): WBC, HGB, HCT, PLT in the last 48 hours.  CMP: No results for input(s): NA, K, CL, CO2, GLU, BUN, CREATININE, CALCIUM, PROT, ALBUMIN, BILITOT, ALKPHOS, AST, ALT, ANIONGAP, EGFRNONAA in the last 48 hours.    Invalid input(s): ESTGFAFR[place]    Significant Imaging: I have reviewed all pertinent imaging results/findings within the past 24 hours.    Assessment/Plan:     * Dysphagia  S/p PEG 10/20/19  -NPO  -Nutritional consult for tube feeding  -GI consult    Hyperlipemia  -NPO      Hypertension  -NPO  -IV hydralazine        VTE Risk Mitigation (From admission, onward)    None             Magdalena Walton  NP  Department of Hospital Medicine   [facility]

## 2019-11-21 NOTE — CONSULTS
"  [facility]  Adult Nutrition  Consult Note    SUMMARY     Recommendations    Recommendation:   1. If continuous feeding is desired, Isosource 1.5 @ goal rate of 45mL/hr to provide 1620 kcals, 73 g pro and 825 mL enteral water; begin at 10-20 mL and advance by 10-20 mL as tolerated by patient; if flush is desired, 200 mL water flush q6hrs     2. If bolus feeding is desired, Isosource 1.5 @ 5 cartons/day to provide 1875 kcals, 85 g pro, and 955 mL enteral water; flush before and after each feeding    Goals: Pt to be administered TF by f/u  Nutrition Goal Status: new  Communication of RD Recs: other (comment)(POC)    Reason for Assessment    Reason For Assessment: consult  Diagnosis: (dysphagia)  Relevant Medical History: CHF, CAD, GERD, HTN, hyperlipidemia  General Information Comments: Remote assessment completed, NFPE not conducted. Pt unable to swallow solids or liquids, PEG tube placed on 11/20. Minor abdominal pain post-surgery.   Nutrition Discharge Planning: PEG    Nutrition Risk Screen    Nutrition Risk Screen: dysphagia or difficulty swallowing, tube feeding or parenteral nutrition    Nutrition/Diet History    Spiritual, Cultural Beliefs, Zoroastrian Practices, Values that Affect Care: no  Food Allergies: NKFA  Factors Affecting Nutritional Intake: difficulty/impaired swallowing, NPO    Anthropometrics    Temp: 97.9 °F (36.6 °C)  Height: 5' 1" (154.9 cm)  Height (inches): 61 in  Weight: 60.8 kg (134 lb)  Weight (lb): 134 lb  Ideal Body Weight (IBW), Female: 105 lb  % Ideal Body Weight, Female (lb): 127.62 lb  BMI (Calculated): 25.3  BMI Grade: 25 - 29.9 - overweight     Lab/Procedures/Meds    Pertinent Labs Reviewed: reviewed  Pertinent Labs Comments: Na 135, albumin 2.9, GFR 30  Pertinent Medications Reviewed: reviewed  Pertinent Medications Comments: statin, pantoprazole    Estimated/Assessed Needs    Weight Used For Calorie Calculations: 60.8 kg (134 lb)  Energy Calorie Requirements (kcal): " 9125-5650  Energy Need Method: Kcal/kg(25-30)  Protein Requirements: 48-61 g(.8-1 g/kg)  Weight Used For Protein Calculations: 60.8 kg (134 lb)  Fluid Requirements (mL): 1 mL/kcal or per MD  Estimated Fluid Requirement Method: other (see comments)  RDA Method (mL): 1519     Nutrition Prescription Ordered    Current Diet Order: NPO  Nutrition Order Comments: Ice chips    Evaluation of Received Nutrient/Fluid Intake    IV Fluid (mL): 75(per hr)  I/O: reviewed  Energy Calories Required: not meeting needs  Protein Required: not meeting needs  Fluid Required: (per MD)  Comments: LBM 11/19  % Intake of Estimated Energy Needs: 0 - 25 %  % Meal Intake: NPO    Nutrition Risk    Level of Risk/Frequency of Follow-up: (2x/wk)     Assessment and Plan  Nutrition Problem  Swallowing difficulty    Related to (etiology):   dysphagia    Signs and Symptoms (as evidenced by):   Pt cannot swallow solid or liquids; need for PEG placement    Interventions:  Collaboration with other providers    Nutrition Diagnosis Status:   New    Monitor and Evaluation    Food and Nutrient Intake: energy intake, enteral nutrition intake  Food and Nutrient Adminstration: enteral and parenteral nutrition administration  Physical Activity and Function: nutrition-related ADLs and IADLs  Biochemical Data, Medical Tests and Procedures: electrolyte and renal panel  Nutrition-Focused Physical Findings: overall appearance     Nutrition Follow-Up    RD Follow-up?: No(pt discharging)

## 2019-11-21 NOTE — HOSPITAL COURSE
"She continued to c/o abdominal pain, but physical exam revealed no tenderness. Later during the day she c/o "fullness in the throat like food was stuck." c/o SOB, chest Xray showed mild fluid buildup. IV Lasix 20 mg ordered in addition to Lasix 40 mg po this am. Robitussin and chloraseptic spray ordered. No food in throat as she had EGD and has been NPO except ice chips. Started tube feedings today, will reassess in am.   11/22 Pt was seen and examined at [facility] . She was determined to be suitable for d/c   Pt and family  Were Educatedon how to use peg tube  Type of TF formula l and direction  Were give on d/c papers  PT/OT evaluate the pt and recommend HH  The re were no acute complication since admission except for anxiery and abdominal pain which improved       "

## 2019-11-22 ENCOUNTER — TELEPHONE (OUTPATIENT)
Dept: GASTROENTEROLOGY | Facility: CLINIC | Age: 84
End: 2019-11-22

## 2019-11-22 VITALS
HEART RATE: 100 BPM | DIASTOLIC BLOOD PRESSURE: 72 MMHG | RESPIRATION RATE: 20 BRPM | TEMPERATURE: 98 F | WEIGHT: 134 LBS | SYSTOLIC BLOOD PRESSURE: 160 MMHG | OXYGEN SATURATION: 91 % | BODY MASS INDEX: 25.3 KG/M2 | HEIGHT: 61 IN

## 2019-11-22 LAB
ANION GAP SERPL CALC-SCNC: 11 MMOL/L (ref 8–16)
BASOPHILS # BLD AUTO: 0.02 K/UL (ref 0–0.2)
BASOPHILS NFR BLD: 0.2 % (ref 0–1.9)
BUN SERPL-MCNC: 21 MG/DL (ref 8–23)
CALCIUM SERPL-MCNC: 9.4 MG/DL (ref 8.7–10.5)
CHLORIDE SERPL-SCNC: 100 MMOL/L (ref 95–110)
CO2 SERPL-SCNC: 25 MMOL/L (ref 23–29)
CREAT SERPL-MCNC: 1.4 MG/DL (ref 0.5–1.4)
DIFFERENTIAL METHOD: ABNORMAL
EOSINOPHIL # BLD AUTO: 0.1 K/UL (ref 0–0.5)
EOSINOPHIL NFR BLD: 0.6 % (ref 0–8)
ERYTHROCYTE [DISTWIDTH] IN BLOOD BY AUTOMATED COUNT: 13.3 % (ref 11.5–14.5)
EST. GFR  (AFRICAN AMERICAN): 39 ML/MIN/1.73 M^2
EST. GFR  (NON AFRICAN AMERICAN): 34 ML/MIN/1.73 M^2
GLUCOSE SERPL-MCNC: 198 MG/DL (ref 70–110)
HCT VFR BLD AUTO: 37.2 % (ref 37–48.5)
HGB BLD-MCNC: 12.2 G/DL (ref 12–16)
IMM GRANULOCYTES # BLD AUTO: 0.05 K/UL (ref 0–0.04)
IMM GRANULOCYTES NFR BLD AUTO: 0.5 % (ref 0–0.5)
LYMPHOCYTES # BLD AUTO: 0.8 K/UL (ref 1–4.8)
LYMPHOCYTES NFR BLD: 7.9 % (ref 18–48)
MAGNESIUM SERPL-MCNC: 1.8 MG/DL (ref 1.6–2.6)
MCH RBC QN AUTO: 31.7 PG (ref 27–31)
MCHC RBC AUTO-ENTMCNC: 32.8 G/DL (ref 32–36)
MCV RBC AUTO: 97 FL (ref 82–98)
MONOCYTES # BLD AUTO: 0.8 K/UL (ref 0.3–1)
MONOCYTES NFR BLD: 7.9 % (ref 4–15)
NEUTROPHILS # BLD AUTO: 8.7 K/UL (ref 1.8–7.7)
NEUTROPHILS NFR BLD: 82.9 % (ref 38–73)
NRBC BLD-RTO: 0 /100 WBC
PLATELET # BLD AUTO: 222 K/UL (ref 150–350)
PMV BLD AUTO: 9.9 FL (ref 9.2–12.9)
POTASSIUM SERPL-SCNC: 4.4 MMOL/L (ref 3.5–5.1)
RBC # BLD AUTO: 3.85 M/UL (ref 4–5.4)
SODIUM SERPL-SCNC: 136 MMOL/L (ref 136–145)
WBC # BLD AUTO: 10.45 K/UL (ref 3.9–12.7)

## 2019-11-22 PROCEDURE — 25000003 PHARM REV CODE 250: Performed by: NURSE PRACTITIONER

## 2019-11-22 PROCEDURE — 80048 BASIC METABOLIC PNL TOTAL CA: CPT

## 2019-11-22 PROCEDURE — 83735 ASSAY OF MAGNESIUM: CPT

## 2019-11-22 PROCEDURE — 36415 COLL VENOUS BLD VENIPUNCTURE: CPT

## 2019-11-22 PROCEDURE — 63600175 PHARM REV CODE 636 W HCPCS: Performed by: NURSE PRACTITIONER

## 2019-11-22 PROCEDURE — 97530 THERAPEUTIC ACTIVITIES: CPT

## 2019-11-22 PROCEDURE — 85025 COMPLETE CBC W/AUTO DIFF WBC: CPT

## 2019-11-22 RX ORDER — MAG HYDROX/ALUMINUM HYD/SIMETH 200-200-20
30 SUSPENSION, ORAL (FINAL DOSE FORM) ORAL EVERY 6 HOURS PRN
Status: DISCONTINUED | OUTPATIENT
Start: 2019-11-22 | End: 2019-11-22 | Stop reason: HOSPADM

## 2019-11-22 RX ADMIN — GUAIFENESIN 200 MG: 200 SOLUTION ORAL at 12:11

## 2019-11-22 RX ADMIN — TRAMADOL HYDROCHLORIDE 50 MG: 50 TABLET, FILM COATED ORAL at 12:11

## 2019-11-22 RX ADMIN — LEVOTHYROXINE SODIUM 25 MCG: 25 TABLET ORAL at 06:11

## 2019-11-22 RX ADMIN — AMLODIPINE BESYLATE 5 MG: 5 TABLET ORAL at 09:11

## 2019-11-22 RX ADMIN — ASPIRIN 81 MG CHEWABLE TABLET 81 MG: 81 TABLET CHEWABLE at 09:11

## 2019-11-22 RX ADMIN — PANTOPRAZOLE SODIUM 40 MG: 40 GRANULE, DELAYED RELEASE ORAL at 09:11

## 2019-11-22 RX ADMIN — TRAMADOL HYDROCHLORIDE 50 MG: 50 TABLET, FILM COATED ORAL at 05:11

## 2019-11-22 RX ADMIN — HYDRALAZINE HYDROCHLORIDE 25 MG: 25 TABLET, FILM COATED ORAL at 09:11

## 2019-11-22 RX ADMIN — GUAIFENESIN 200 MG: 200 SOLUTION ORAL at 05:11

## 2019-11-22 NOTE — DISCHARGE SUMMARY
"[facility] Hospital Medicine  Discharge Summary      Patient Name: Danielle Cruz  MRN: [id number]  Admission Date: 11/20/2019  Hospital Length of Stay: 0 days  Discharge Date and Time: 11/22/2019  2:16 PM  Attending Physician: No att. providers found   Discharging Provider: Tanner Carbone MD  Primary Care Provider: Jan Qureshi MD      HPI:   Danielle Cruz is an 87 yo female patient with PMHx of HTN, hypothyroidism, HLP, spinal compression fractures, esophageal stricture, who had an elective PEG tube placemen today with DR. Lopez. 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 Family stated she had a speech evaluation which showed she was at high risk for aspiration. GI Clinic NP stated she was unable to handle either liquids or solids, but family has been giving her pills. She has had several health setbacks in the past 6 months: spinal compression fracture, shingles, Cholecystectomy. She recently moved to  from [place] where she lived 20 years. PEG tube placed today.  She is extended recovery for PEG placement.    Procedure(s) (LRB):  INSERTION, PEG TUBE (N/A)      Hospital Course:   She continued to c/o abdominal pain, but physical exam revealed no tenderness. Later during the day she c/o "fullness in the throat like food was stuck." c/o SOB, chest Xray showed mild fluid buildup. IV Lasix 20 mg ordered in addition to Lasix 40 mg po this am. Robitussin and chloraseptic spray ordered. No food in throat as she had EGD and has been NPO except ice chips. Started tube feedings today, will reassess in am.   11/22 [place] [facility] [place] was determined to be suitable for d/c   Pt and family  Were Educatedon how to use peg tube  Type of TF formula l and direction  Were give on d/c papers  PT/OT evaluate the pt and recommend HH  The re were no acute complication since admission except for anxiery " and abdominal pain which improved          Consults:   Consults (From admission, onward)        Status Ordering Provider     Inpatient consult to Registered Dietitian/Nutritionist  Once     Provider:  (Not yet assigned)    Completed IDALIA ARMAS     Inpatient consult to Registered Dietitian/Nutritionist  Once     Provider:  (Not yet assigned)    Completed MAYRA MATHUR          No new Assessment & Plan notes have been filed under this hospital service since the last note was generated.  Service: Hospital Medicine    Final Active Diagnoses:    Diagnosis Date Noted POA    PRINCIPAL PROBLEM:  Dysphagia [R13.10] 11/20/2019 Yes    Pressure injury of sacral region, stage 3 [L89.153] 11/21/2019 Yes    S/P percutaneous endoscopic gastrostomy (PEG) tube placement [Z93.1]  Not Applicable    Hypertension [I10]  Yes    Hyperlipemia [E78.5]  Yes      Problems Resolved During this Admission:       Discharged Condition: stable    Disposition: Home-Health Care [facility]    Follow Up:  Follow-up Information     Jan Qureshi MD In 1 week.    Specialty:  Family Medicine  Contact information:  [address] [facility] [address] [facility]  [place] [address]  [phone]             Renard Lopez Iii, MD In 2 weeks.    Specialty:  Gastroenterology  Contact information:  [address]  [place] [address]  [phone]                 Patient Instructions:      Ambulatory referral to Home Health   Referral Priority: Routine Referral Type: Home Health   Referral Reason: Specialty Services Required   Requested Specialty: Home Health Services   Number of Visits Requested: 1     Diet clear liquid     Notify your health care provider if you experience any of the following:  temperature >100.4     Notify your health care provider if you experience any of the following:  persistent nausea and vomiting or diarrhea     Notify your health care provider if you experience any of the following:  severe uncontrolled pain     Notify your health  care provider if you experience any of the following:  redness, tenderness, or signs of infection (pain, swelling, redness, odor or green/yellow discharge around incision site)     Notify your health care provider if you experience any of the following:  difficulty breathing or increased cough     Notify your health care provider if you experience any of the following:  severe persistent headache     Notify your health care provider if you experience any of the following:  worsening rash     Notify your health care provider if you experience any of the following:  persistent dizziness, light-headedness, or visual disturbances     Notify your health care provider if you experience any of the following:  increased confusion or weakness     Notify your health care provider if you experience any of the following:     Tube Feedings/Formulas   Order Comments: Recommendation:   1. If continuous feeding is desired, Isosource 1.5 @ goal rate of 45mL/hr to provide 1620 kcals, 73 g pro and 825 mL enteral water; begin at 10-20 mL and advance by 10-20 mL as tolerated by patient; if flush is desired, 200 mL water flush q6hrs      2. If bolus feeding is desired, Isosource 1.5 @ 5 cartons/day to provide 1875 kcals, 85 g pro, and 955 mL enteral water; flush before and after each feeding     Order Specific Question Answer Comments   Select Adult Formula: Isosource 1.5 yuriy    Route: Gastrostomy      Activity as tolerated       Significant Diagnostic Studies: Labs:   BMP:   Recent Labs   Lab 11/21/19  0456 11/22/19  0846   GLU 84 198*   * 136   K 3.7 4.4   CL 98 100   CO2 25 25   BUN 12 21   CREATININE 1.2 1.4   CALCIUM 9.1 9.4   MG 1.8 1.8   , CMP   Recent Labs   Lab 11/21/19  0456 11/22/19  0846   * 136   K 3.7 4.4   CL 98 100   CO2 25 25   GLU 84 198*   BUN 12 21   CREATININE 1.2 1.4   CALCIUM 9.1 9.4   PROT 6.1  --    ALBUMIN 2.9*  --    BILITOT 0.8  --    ALKPHOS 94  --    AST 25  --    ALT 20  --    ANIONGAP 12 11    ESTGFRAFRICA 47* 39*   EGFRNONAA 40* 34*    and CBC   Recent Labs   Lab 11/21/19  0456 11/22/19  0846   WBC 10.39 10.45   HGB 11.9* 12.2   HCT 36.7* 37.2    222     Microbiology: Blood Culture No results found for: LABBLOO    Pending Diagnostic Studies:     None         Medications:  Reconciled Home Medications:      Medication List      CONTINUE taking these medications    aluminum-magnesium hydroxide-simethicone 200-200-20 mg/5 mL Susp  Commonly known as:  MAALOX  Take 30 mLs by mouth every 6 (six) hours as needed.     amLODIPine 5 MG tablet  Commonly known as:  NORVASC  Take 1 tablet (5 mg total) by mouth once daily.     aspirin 81 MG EC tablet  Commonly known as:  ECOTRIN  Take 81 mg by mouth once daily.     atorvastatin 40 MG tablet  Commonly known as:  LIPITOR  Take 1 tablet (40 mg total) by mouth nightly.     famotidine 20 MG tablet  Commonly known as:  PEPCID  Take 1 tablet (20 mg total) by mouth 2 (two) times daily.     furosemide 40 MG tablet  Commonly known as:  LASIX  Take 1 tablet (40 mg total) by mouth daily as needed (For leg swelling and weight gain 3 lbs).     hydrALAZINE 25 MG tablet  Commonly known as:  APRESOLINE  Take 25 mg by mouth once daily.     levothyroxine 25 MCG tablet  Commonly known as:  SYNTHROID  Take 25 mcg by mouth once daily.     ondansetron 4 MG Tbdl  Commonly known as:  ZOFRAN-ODT     pantoprazole 40 MG tablet  Commonly known as:  PROTONIX  Take 1 tablet (40 mg total) by mouth 2 (two) times daily.     traMADol 50 mg tablet  Commonly known as:  ULTRAM  Take 1 tablet (50 mg total) by mouth every 8 (eight) hours as needed for Pain.            Indwelling Lines/Drains at time of discharge:   Lines/Drains/Airways     Drain                 Gastrostomy/Enterostomy 11/20/19 1023 Percutaneous endoscopic gastrostomy (PEG) LUQ 2 days          Pressure Ulcer                 Pressure Injury 10/29/19 1452 midline Sacral spine Stage 3 24 days                Time spent on the discharge  of patient: 35 minutes  Patient was seen and examined on the date of discharge and determined to be suitable for discharge.         Tanner Carbone MD  Department of Hospital Medicine  [facility]

## 2019-11-22 NOTE — PLAN OF CARE
CM spoke to patient and daughter Mahesh related to anticipated discharge plan home with [facility] and [facility] will be providing Bolus feeding to the family. CM spoke to Magan from [facility] related to patient. Magan from [facility] will be coming to the facility on this morning. CM spoke to family who states that they have been educated on Bolus feeding and times. Patients daughter states that the patient will go home and they will do the Bolus feeding for the patient.

## 2019-11-22 NOTE — PLAN OF CARE
No acute distress noted.   IV fluids infusing.   NPO. Pt receiving isosource 1.5 5 cans daily, 250ml FWF 4Xdaily.  Tolerated TF well, Pt family administered TF will nurse teaching. No questions or concerns.  Drsg dry and intact.   Safety measures are in place.   Call bell within reach.   Pain being managed.   Pt free of falls.   Will continue to monitor

## 2019-11-22 NOTE — TELEPHONE ENCOUNTER
----- Message from Supriya Mckeon sent at 11/22/2019  1:41 PM CST -----  Contact: gene/daughter  Please call pt daughter @ [phone], states pt was release from Hospital today, Gene have questions for Dr Lopez nurse, daughter do not want to speak with NP

## 2019-11-22 NOTE — PLAN OF CARE
CM spoke to Mariela with [facility] who verified that patient is currently set up with them.        11/22/19 1131   Post-Acute Status   Post-Acute Authorization Home Health/Hospice   Home Health/Hospice Status Set-up Complete

## 2019-11-22 NOTE — PLAN OF CARE
Pt is ready for discharge. AVS explained, all questions and concerns addressed. Pain controlled with prn med. Educated pt on how to use peg tube. Teach back done.

## 2019-11-22 NOTE — TELEPHONE ENCOUNTER
----- Message from Luciana Zavala RN sent at 11/22/2019 10:45 AM CST -----  Unable to set up appt. Pls assist pt to set up appt in 2 weeks. Thanks

## 2019-11-22 NOTE — PT/OT/SLP PROGRESS
Physical Therapy  Treatment    Danielle Cruz   MRN: [id number]   Admitting Diagnosis: Dysphagia    PT Received On: 11/22/19  PT Start Time: 1015     PT Stop Time: 1030    PT Total Time (min): 15 min       Billable Minutes:  Therapeutic Activity 15    Treatment Type: Treatment  PT/PTA: PT     PTA Visit Number: 0       General Precautions: Standard, fall  Orthopedic Precautions: N/A   Braces: N/A         Subjective:  Communicated with NURSE JACINTA AND Epic CHART REVIEW  prior to session.   PT AGREED TO TX     Pain/Comfort  Pain Rating 1: 6/10  Location - Side 1: Left  Location 1: abdomen  Pain Rating Post-Intervention 1: 6/10    Objective:   Patient found with: peripheral IV, PEG Tube    Functional Mobility:  PT MET IN RM SUP>SIT EOB WITH MIN A. PT SCOOTED TO EOB WITH MIN A. PT STOOD WITH RW AND T/F TO CHAIR WITH MIN A. PT SEATED FOR B LE TE X 10 REPS OF AP AND TKE. PT LEFT SEATED WITH ALL NEEDS MET     AM-PAC 6 CLICK MOBILITY  How much help from another person does this patient currently need?   1 = Unable, Total/Dependent Assistance  2 = A lot, Maximum/Moderate Assistance  3 = A little, Minimum/Contact Guard/Supervision  4 = None, Modified [place]/Independent    Turning over in bed (including adjusting bedclothes, sheets and blankets)?: 3  Sitting down on and standing up from a chair with arms (e.g., wheelchair, bedside commode, etc.): 3  Moving from lying on back to sitting on the side of the bed?: 3  Moving to and from a bed to a chair (including a wheelchair)?: 3  Need to walk in hospital room?: 1  Climbing 3-5 steps with a railing?: 1  Basic Mobility Total Score: 14    AM-PAC Raw Score CMS G-Code Modifier Level of Impairment Assistance   6 % Total / Unable   7 - 9 CM 80 - 100% Maximal Assist   10 - 14 CL 60 - 80% Moderate Assist   15 - 19 CK 40 - 60% Moderate Assist   20 - 22 CJ 20 - 40% Minimal Assist   23 CI 1-20% SBA / CGA   24 CH 0% Independent/ Mod I     Patient left up in chair with call  button in reach and chair alarm on.    Assessment:  PT JANETTE MIN TX WITH INC PAIN     Rehab identified problem list/impairments: Rehab identified problem list/impairments: weakness, impaired functional mobilty, impaired endurance, impaired balance, gait instability, decreased lower extremity function, decreased upper extremity function, pain, decreased ROM, impaired self care skills    Rehab potential is good.    Activity tolerance: Poor    Discharge recommendations: Discharge Facility/Level of Care Needs: home health PT(24 HOUR CAREGIVER)     Barriers to discharge:      Equipment recommendations:       GOALS:   Multidisciplinary Problems     Physical Therapy Goals        Problem: Physical Therapy Goal    Goal Priority Disciplines Outcome Goal Variances Interventions   Physical Therapy Goal     PT, PT/OT Ongoing, Progressing     Description:  PT WILL BE SEEN FOR P.T. FOR A MIN OF 5 OUT OF 7 DAYS A WEEK  LT19  1. PT WILL COMPLETE BED MOBILITY MOD I  2. PT WILL T/F TO CHAIR WITH AND SBA  3. PT WILL GT TRAIN X 25' WITH MIN A  4. PT WILL COMPLETE B LE TE X 15 REPS FOR STRENGTHENING.                     PLAN:    Patient to be seen to address the above listed problems via gait training, therapeutic activities, therapeutic exercises  Plan of Care expires: 19  Plan of Care reviewed with: patient         Kristina Redd, PT  2019

## 2019-11-22 NOTE — PLAN OF CARE
Patient and family states that she is currently with [facility] and will resume.         11/22/19 2618   Post-Acute Status   Post-Acute Authorization Home Health/Hospice   Home Health/Hospice Status Referrals Sent

## 2019-11-22 NOTE — PLAN OF CARE
No acute distress noted. Pt refused third feeding tonight.Peg tube patent.  Safety measures are in place. Call bell within reach. Pain being managed. Pt free of falls. Will continue to monitor. Chart check complete.

## 2019-11-22 NOTE — NURSING
Tube feeding given by family member. Provided education to family as needed during feeding. Pt tolerated well.

## 2021-02-22 NOTE — H&P (VIEW-ONLY)
"Clinic Follow Up:  [facility] Follow Up Note    Reason for Follow Up:  The primary encounter diagnosis was Abnormal esophagram. A diagnosis of Esophageal dysphagia was also pertinent to this visit.    PCP: Jan Qureshi       HPI:  This is a 88 y.o. female here for follow up of the above. Symptoms were improving at last visit. Since that time esophagram was performed which showed circumferential narrowing of the mid esophagus. Associated with mild upstream dilation. EGD recommended and patient and family agree. Patient has become much more symptomatic since last visit. She is now unable to swallow anything other than liquids. She is very upset in visit about her condition and states that she is "unable to go on like this".     Review of Systems   Constitutional: Negative for activity change and appetite change.        As per interval history above   Respiratory: Negative for cough and shortness of breath.    Cardiovascular: Negative for chest pain.   Gastrointestinal: Negative for abdominal pain, anal bleeding, blood in stool, constipation, diarrhea, nausea and vomiting.        Dysphagia    Skin: Negative for color change and rash.       Medical History:  Past Medical History:   Diagnosis Date    CHF (congestive heart failure)     Coronary artery disease of native artery of native heart with stable angina pectoris 11/5/2019    CVA (cerebral vascular accident)     Depression     GERD (gastroesophageal reflux disease)     Hyperlipemia     Hypertension     Hypothyroidism        Surgical History:   Past Surgical History:   Procedure Laterality Date    CHOLECYSTECTOMY      CORONARY ARTERY BYPASS GRAFT      KNEE SURGERY         Family History:   No family history on file.    Social History:   Social History     Tobacco Use    Smoking status: Former Smoker    Smokeless tobacco: Never Used   Substance Use Topics    Alcohol use: Never     Frequency: Never    Drug use: Not Currently " Patient was not sure if her labs were normal or not. She was wondering if she is pregnant because she hasn't had a period at all. Patient is advised that we did not do a preg test, but we checked different hormone levels to see if this is causing her lack of menses. Patient made follow up shante with  to review labs and US and to discuss getting pregnant. Patient had no other questions at this time.   "      Allergies:   Review of patient's allergies indicates:   Allergen Reactions    Lisinopril Blisters       Home Medications:  Current Outpatient Medications on File Prior to Visit   Medication Sig Dispense Refill    aluminum-magnesium hydroxide-simethicone (MAALOX) 200-200-20 mg/5 mL Susp Take 30 mLs by mouth every 6 (six) hours as needed.      amLODIPine (NORVASC) 5 MG tablet Take 1 tablet (5 mg total) by mouth once daily. 30 tablet 3    aspirin (ECOTRIN) 81 MG EC tablet Take 81 mg by mouth once daily.      atorvastatin (LIPITOR) 40 MG tablet Take 1 tablet (40 mg total) by mouth nightly. 60 tablet 1    famotidine (PEPCID) 20 MG tablet Take 1 tablet (20 mg total) by mouth 2 (two) times daily. 60 tablet 5    furosemide (LASIX) 40 MG tablet Take 1 tablet (40 mg total) by mouth daily as needed (For leg swelling and weight gain 3 lbs). 60 tablet 3    hydrALAZINE (APRESOLINE) 25 MG tablet Take 25 mg by mouth once daily.      levothyroxine (SYNTHROID) 25 MCG tablet Take 25 mcg by mouth once daily.      ondansetron (ZOFRAN-ODT) 4 MG TbDL       pantoprazole (PROTONIX) 40 MG tablet Take 1 tablet (40 mg total) by mouth 2 (two) times daily. 60 tablet 2    traMADol (ULTRAM) 50 mg tablet Take 1 tablet (50 mg total) by mouth every 8 (eight) hours as needed for Pain. 12 tablet 0     No current facility-administered medications on file prior to visit.        /60   Pulse 78   Ht 5' 1" (1.549 m)   Wt 60.8 kg (134 lb) Comment: stated wt/pt in w/c  SpO2 97%   BMI 25.32 kg/m²   Body mass index is 25.32 kg/m².  Physical Exam   Constitutional: She is oriented to person, place, and time and well-developed, well-nourished, and in no distress. No distress.   HENT:   Head: Normocephalic.   Eyes: Pupils are equal, round, and reactive to light. Conjunctivae are normal.   Cardiovascular: Normal rate, regular rhythm and normal heart sounds.   Pulmonary/Chest: Effort normal and breath sounds normal. No respiratory " distress.   Abdominal: Soft. Bowel sounds are normal. She exhibits no distension. There is no tenderness.   Neurological: She is alert and oriented to person, place, and time. No cranial nerve deficit.   Skin: Skin is warm and dry. No rash noted.   Psychiatric: Mood and affect normal.       Labs: Pertinent labs reviewed.    Assessment:  1. Abnormal esophagram    2. Esophageal dysphagia        Recommendations:  - needs EGD.   -     Case request GI: ESOPHAGOGASTRODUODENOSCOPY (EGD)    Return to Clinic:  Follow up to be determined after procedure.    Thank you for the opportunity to participate in the care of this patient.  LUTHER Kauffman

## 2021-05-06 ENCOUNTER — PATIENT MESSAGE (OUTPATIENT)
Dept: RESEARCH | Facility: HOSPITAL | Age: 86
End: 2021-05-06

## 2022-01-25 NOTE — ASSESSMENT & PLAN NOTE
-improved- NAPOLEON hose placed to BLE  -- imaging showing mild pleural effusion and trace edema to BLE   -Lasix x 1 dose given   -creatinine elevated with upward trend noted   -BB held due to Bradycardia   -Echo results showed EF 55% with diastolic dysfunction and mild AVR  -supplemental oxygen as needed   -daily weights   -strict I/Os  -low sodium diet with fluid restriction        show

## 2025-06-09 NOTE — TELEPHONE ENCOUNTER
Returned call to pt's daughter. Left message to return call. Needs to be advised of radiology appts on 11/7/19   <-- Click to add NO pertinent Family History